# Patient Record
Sex: FEMALE | Race: WHITE | URBAN - METROPOLITAN AREA
[De-identification: names, ages, dates, MRNs, and addresses within clinical notes are randomized per-mention and may not be internally consistent; named-entity substitution may affect disease eponyms.]

---

## 2017-01-17 ENCOUNTER — DOCTOR'S OFFICE (OUTPATIENT)
Dept: URBAN - METROPOLITAN AREA CLINIC 137 | Facility: CLINIC | Age: 82
Setting detail: OPHTHALMOLOGY
End: 2017-01-17
Payer: COMMERCIAL

## 2017-01-17 DIAGNOSIS — H43.811: ICD-10-CM

## 2017-01-17 DIAGNOSIS — Z96.1: ICD-10-CM

## 2017-01-17 DIAGNOSIS — H53.122: ICD-10-CM

## 2017-01-17 DIAGNOSIS — H43.812: ICD-10-CM

## 2017-01-17 DIAGNOSIS — H59.032: ICD-10-CM

## 2017-01-17 PROCEDURE — 92014 COMPRE OPH EXAM EST PT 1/>: CPT | Performed by: OPHTHALMOLOGY

## 2017-01-17 PROCEDURE — 92235 FLUORESCEIN ANGRPH MLTIFRAME: CPT | Performed by: OPHTHALMOLOGY

## 2017-01-17 PROCEDURE — 92134 CPTRZ OPH DX IMG PST SGM RTA: CPT | Performed by: OPHTHALMOLOGY

## 2017-01-17 PROCEDURE — 92226 OPHTHALMOSCOPY EXT SUBSEQUENT: CPT | Performed by: OPHTHALMOLOGY

## 2017-01-17 PROCEDURE — 92250 FUNDUS PHOTOGRAPHY W/I&R: CPT | Performed by: OPHTHALMOLOGY

## 2017-01-17 ASSESSMENT — REFRACTION_CURRENTRX
OS_VPRISM_DIRECTION: PROGS
OD_OVR_VA: 20/
OD_ADD: +2.50
OD_OVR_VA: 20/
OS_OVR_VA: 20/
OD_SPHERE: +3.25
OS_SPHERE: +5.25
OD_OVR_VA: 20/
OS_ADD: +2.50
OD_VPRISM_DIRECTION: PROGS
OD_CYLINDER: +0.75
OS_OVR_VA: 20/
OS_CYLINDER: +0.50
OD_AXIS: 153
OS_OVR_VA: 20/
OS_AXIS: 125

## 2017-01-17 ASSESSMENT — REFRACTION_AUTOREFRACTION
OD_AXIS: 168
OD_SPHERE: -0.25
OS_AXIS: 167
OS_CYLINDER: +2.50
OD_CYLINDER: +1.00
OS_SPHERE: +0.50

## 2017-01-17 ASSESSMENT — REFRACTION_OUTSIDERX
OD_VA1: 20/25-1
OS_VA3: 20/
OD_CYLINDER: +0.75
OD_VA2: 20/J-1
OS_VA1: 20/60
OS_ADD: +2.50
OS_AXIS: 010
OU_VA: 20/
OS_CYLINDER: SPH
OD_VA3: 20/
OS_SPHERE: +5.25
OU_VA: 20/
OD_VA1: 20/25
OD_SPHERE: -0.50
OD_ADD: +2.50
OD_AXIS: 165
OD_VA3: 20/
OD_CYLINDER: +0.75
OD_ADD: +2.50
OS_SPHERE: PLANO
OS_VA2: 20/
OS_CYLINDER: +0.75
OS_VA3: 20/
OD_SPHERE: +3.25
OD_VA2: 20/
OS_ADD: +2.50
OS_VA1: 20/40-
OD_AXIS: 180

## 2017-01-17 ASSESSMENT — LID EXAM ASSESSMENTS
OD_BLEPHARITIS: T
OS_BLEPHARITIS: T

## 2017-01-17 ASSESSMENT — REFRACTION_MANIFEST
OD_VA1: 20/
OU_VA: 20/
OS_VA2: 20/
OS_VA3: 20/
OD_VA2: 20/
OD_VA3: 20/
OS_VA1: 20/

## 2017-01-17 ASSESSMENT — CONFRONTATIONAL VISUAL FIELD TEST (CVF)
OS_FINDINGS: FULL
OD_FINDINGS: FULL

## 2017-01-17 ASSESSMENT — VISUAL ACUITY
OS_BCVA: 20/25
OD_BCVA: 20/60-1

## 2017-01-17 ASSESSMENT — SPHEQUIV_DERIVED
OS_SPHEQUIV: 1.75
OD_SPHEQUIV: 0.25

## 2017-02-21 ENCOUNTER — DOCTOR'S OFFICE (OUTPATIENT)
Dept: URBAN - METROPOLITAN AREA CLINIC 137 | Facility: CLINIC | Age: 82
Setting detail: OPHTHALMOLOGY
End: 2017-02-21
Payer: COMMERCIAL

## 2017-02-21 DIAGNOSIS — H59.032: ICD-10-CM

## 2017-02-21 DIAGNOSIS — H02.015: ICD-10-CM

## 2017-02-21 DIAGNOSIS — H02.014: ICD-10-CM

## 2017-02-21 DIAGNOSIS — H43.811: ICD-10-CM

## 2017-02-21 DIAGNOSIS — H53.122: ICD-10-CM

## 2017-02-21 DIAGNOSIS — H43.812: ICD-10-CM

## 2017-02-21 PROCEDURE — 92134 CPTRZ OPH DX IMG PST SGM RTA: CPT | Performed by: OPHTHALMOLOGY

## 2017-02-21 PROCEDURE — 67820 REVISE EYELASHES: CPT | Performed by: OPHTHALMOLOGY

## 2017-02-21 PROCEDURE — 92226 OPHTHALMOSCOPY EXT SUBSEQUENT: CPT | Performed by: OPHTHALMOLOGY

## 2017-02-21 PROCEDURE — 92014 COMPRE OPH EXAM EST PT 1/>: CPT | Performed by: OPHTHALMOLOGY

## 2017-02-21 ASSESSMENT — REFRACTION_OUTSIDERX
OD_VA1: 20/25
OD_ADD: +2.50
OS_ADD: +2.50
OD_ADD: +2.50
OS_VA3: 20/
OS_AXIS: 010
OD_CYLINDER: +0.75
OU_VA: 20/
OD_VA2: 20/J-1
OS_SPHERE: +5.25
OS_SPHERE: PLANO
OS_VA3: 20/
OS_VA1: 20/40-
OD_CYLINDER: +0.75
OD_AXIS: 180
OD_VA3: 20/
OU_VA: 20/
OS_CYLINDER: +0.75
OS_ADD: +2.50
OD_VA3: 20/
OD_SPHERE: -0.50
OD_SPHERE: +3.25
OD_AXIS: 165
OS_VA2: 20/
OD_VA2: 20/
OS_CYLINDER: SPH
OS_VA1: 20/60
OD_VA1: 20/25-1

## 2017-02-21 ASSESSMENT — REFRACTION_CURRENTRX
OS_ADD: +2.50
OS_OVR_VA: 20/
OD_SPHERE: +3.25
OD_VPRISM_DIRECTION: PROGS
OS_SPHERE: +5.25
OD_OVR_VA: 20/
OS_VPRISM_DIRECTION: PROGS
OS_AXIS: 125
OS_CYLINDER: +0.50
OD_AXIS: 153
OD_OVR_VA: 20/
OD_CYLINDER: +0.75
OS_OVR_VA: 20/
OD_ADD: +2.50
OS_OVR_VA: 20/
OD_OVR_VA: 20/

## 2017-02-21 ASSESSMENT — SPHEQUIV_DERIVED
OS_SPHEQUIV: 1.75
OD_SPHEQUIV: 0.25

## 2017-02-21 ASSESSMENT — LID EXAM ASSESSMENTS
OD_BLEPHARITIS: 2+
OS_MEIBOMITIS: 2+
OS_TRICHIASIS: LLL LUL 1+ 2+
OS_BLEPHARITIS: 2+
OD_MEIBOMITIS: 2+

## 2017-02-21 ASSESSMENT — VISUAL ACUITY
OS_BCVA: 20/25-2
OD_BCVA: 20/50

## 2017-02-21 ASSESSMENT — REFRACTION_MANIFEST
OD_VA3: 20/
OS_VA3: 20/
OU_VA: 20/
OS_VA1: 20/
OD_VA1: 20/
OS_VA2: 20/
OD_VA2: 20/

## 2017-02-21 ASSESSMENT — REFRACTION_AUTOREFRACTION
OS_SPHERE: +0.50
OD_AXIS: 168
OD_CYLINDER: +1.00
OS_CYLINDER: +2.50
OD_SPHERE: -0.25
OS_AXIS: 167

## 2017-02-21 ASSESSMENT — CONFRONTATIONAL VISUAL FIELD TEST (CVF)
OS_FINDINGS: FULL
OD_FINDINGS: FULL

## 2017-03-10 ENCOUNTER — DOCTOR'S OFFICE (OUTPATIENT)
Dept: URBAN - METROPOLITAN AREA CLINIC 137 | Facility: CLINIC | Age: 82
Setting detail: OPHTHALMOLOGY
End: 2017-03-10

## 2017-03-10 DIAGNOSIS — H05.013: ICD-10-CM

## 2017-03-10 PROCEDURE — 67820 REVISE EYELASHES: CPT | Performed by: OPHTHALMOLOGY

## 2017-03-21 ENCOUNTER — RX ONLY (RX ONLY)
Age: 82
End: 2017-03-21

## 2017-03-21 ENCOUNTER — DOCTOR'S OFFICE (OUTPATIENT)
Dept: URBAN - METROPOLITAN AREA CLINIC 137 | Facility: CLINIC | Age: 82
Setting detail: OPHTHALMOLOGY
End: 2017-03-21
Payer: COMMERCIAL

## 2017-03-21 DIAGNOSIS — H01.005: ICD-10-CM

## 2017-03-21 DIAGNOSIS — H01.004: ICD-10-CM

## 2017-03-21 DIAGNOSIS — H02.012: ICD-10-CM

## 2017-03-21 DIAGNOSIS — H01.001: ICD-10-CM

## 2017-03-21 DIAGNOSIS — H01.002: ICD-10-CM

## 2017-03-21 DIAGNOSIS — H43.812: ICD-10-CM

## 2017-03-21 DIAGNOSIS — H02.015: ICD-10-CM

## 2017-03-21 DIAGNOSIS — H53.122: ICD-10-CM

## 2017-03-21 DIAGNOSIS — H43.811: ICD-10-CM

## 2017-03-21 DIAGNOSIS — H59.032: ICD-10-CM

## 2017-03-21 DIAGNOSIS — H40.013: ICD-10-CM

## 2017-03-21 PROCEDURE — 92134 CPTRZ OPH DX IMG PST SGM RTA: CPT | Performed by: OPHTHALMOLOGY

## 2017-03-21 PROCEDURE — 92014 COMPRE OPH EXAM EST PT 1/>: CPT | Performed by: OPHTHALMOLOGY

## 2017-03-21 PROCEDURE — 92226 OPHTHALMOSCOPY EXT SUBSEQUENT: CPT | Performed by: OPHTHALMOLOGY

## 2017-03-21 ASSESSMENT — REFRACTION_OUTSIDERX
OD_ADD: +2.50
OD_VA3: 20/
OD_AXIS: 165
OS_VA2: 20/
OD_SPHERE: -0.50
OD_VA1: 20/25-1
OD_VA1: 20/25
OS_VA3: 20/
OS_ADD: +2.50
OS_SPHERE: PLANO
OU_VA: 20/
OD_CYLINDER: +0.75
OS_VA1: 20/60
OS_AXIS: 010
OD_CYLINDER: +0.75
OS_VA1: 20/40-
OS_SPHERE: +5.25
OD_ADD: +2.50
OS_CYLINDER: +0.75
OU_VA: 20/
OD_SPHERE: +3.25
OD_VA2: 20/
OD_VA3: 20/
OS_VA3: 20/
OD_VA2: 20/J-1
OD_AXIS: 180
OS_CYLINDER: SPH
OS_ADD: +2.50

## 2017-03-21 ASSESSMENT — REFRACTION_AUTOREFRACTION
OD_CYLINDER: +1.00
OD_AXIS: 168
OS_AXIS: 167
OS_CYLINDER: +2.50
OS_SPHERE: +0.50
OD_SPHERE: -0.25

## 2017-03-21 ASSESSMENT — REFRACTION_CURRENTRX
OD_OVR_VA: 20/
OS_SPHERE: +5.25
OS_OVR_VA: 20/
OD_OVR_VA: 20/
OD_VPRISM_DIRECTION: PROGS
OD_SPHERE: +3.25
OS_OVR_VA: 20/
OD_ADD: +2.50
OS_VPRISM_DIRECTION: PROGS
OD_OVR_VA: 20/
OS_OVR_VA: 20/
OD_CYLINDER: +0.75
OS_CYLINDER: +0.50
OS_ADD: +2.50
OD_AXIS: 153
OS_AXIS: 125

## 2017-03-21 ASSESSMENT — REFRACTION_MANIFEST
OS_VA2: 20/
OD_VA2: 20/
OD_VA1: 20/
OU_VA: 20/
OD_VA3: 20/
OS_VA3: 20/
OS_VA1: 20/

## 2017-03-21 ASSESSMENT — LID EXAM ASSESSMENTS
OS_TRICHIASIS: LLL 1+
OS_MEIBOMITIS: 2+
OD_MEIBOMITIS: 2+
OS_BLEPHARITIS: 2+
OD_BLEPHARITIS: 2+
OD_TRICHIASIS: RLL 1+

## 2017-03-21 ASSESSMENT — VISUAL ACUITY
OS_BCVA: 20/20-1
OD_BCVA: 20/40+1

## 2017-03-21 ASSESSMENT — SPHEQUIV_DERIVED
OS_SPHEQUIV: 1.75
OD_SPHEQUIV: 0.25

## 2017-03-21 ASSESSMENT — CONFRONTATIONAL VISUAL FIELD TEST (CVF)
OS_FINDINGS: FULL
OD_FINDINGS: FULL

## 2017-04-18 ENCOUNTER — DOCTOR'S OFFICE (OUTPATIENT)
Dept: URBAN - METROPOLITAN AREA CLINIC 137 | Facility: CLINIC | Age: 82
Setting detail: OPHTHALMOLOGY
End: 2017-04-18
Payer: COMMERCIAL

## 2017-04-18 DIAGNOSIS — H01.004: ICD-10-CM

## 2017-04-18 DIAGNOSIS — H01.005: ICD-10-CM

## 2017-04-18 DIAGNOSIS — H43.812: ICD-10-CM

## 2017-04-18 DIAGNOSIS — H40.013: ICD-10-CM

## 2017-04-18 DIAGNOSIS — H04.121: ICD-10-CM

## 2017-04-18 DIAGNOSIS — H53.122: ICD-10-CM

## 2017-04-18 DIAGNOSIS — H01.002: ICD-10-CM

## 2017-04-18 DIAGNOSIS — H59.032: ICD-10-CM

## 2017-04-18 DIAGNOSIS — H43.811: ICD-10-CM

## 2017-04-18 DIAGNOSIS — H02.012: ICD-10-CM

## 2017-04-18 DIAGNOSIS — H02.015: ICD-10-CM

## 2017-04-18 DIAGNOSIS — H01.001: ICD-10-CM

## 2017-04-18 PROCEDURE — 92134 CPTRZ OPH DX IMG PST SGM RTA: CPT | Performed by: OPHTHALMOLOGY

## 2017-04-18 PROCEDURE — 92014 COMPRE OPH EXAM EST PT 1/>: CPT | Performed by: OPHTHALMOLOGY

## 2017-04-18 ASSESSMENT — REFRACTION_AUTOREFRACTION
OS_SPHERE: +0.50
OS_AXIS: 167
OD_SPHERE: -0.25
OD_CYLINDER: +1.00
OD_AXIS: 168
OS_CYLINDER: +2.50

## 2017-04-18 ASSESSMENT — REFRACTION_OUTSIDERX
OS_ADD: +2.50
OD_VA1: 20/25
OD_CYLINDER: +0.75
OD_VA2: 20/
OD_SPHERE: -0.50
OU_VA: 20/
OD_ADD: +2.50
OD_AXIS: 165
OS_AXIS: 010
OS_VA2: 20/
OD_VA1: 20/25-1
OD_VA2: 20/J-1
OS_SPHERE: PLANO
OS_VA1: 20/40-
OS_SPHERE: +5.25
OS_VA1: 20/60
OS_VA3: 20/
OU_VA: 20/
OS_CYLINDER: SPH
OD_VA3: 20/
OD_SPHERE: +3.25
OS_CYLINDER: +0.75
OD_VA3: 20/
OD_AXIS: 180
OS_ADD: +2.50
OD_CYLINDER: +0.75
OD_ADD: +2.50
OS_VA3: 20/

## 2017-04-18 ASSESSMENT — REFRACTION_CURRENTRX
OD_OVR_VA: 20/
OD_AXIS: 153
OS_AXIS: 125
OS_OVR_VA: 20/
OS_VPRISM_DIRECTION: PROGS
OS_ADD: +2.50
OD_OVR_VA: 20/
OS_CYLINDER: +0.50
OD_CYLINDER: +0.75
OD_ADD: +2.50
OD_OVR_VA: 20/
OS_OVR_VA: 20/
OD_VPRISM_DIRECTION: PROGS
OD_SPHERE: +3.25
OS_SPHERE: +5.25
OS_OVR_VA: 20/

## 2017-04-18 ASSESSMENT — LID EXAM ASSESSMENTS
OS_BLEPHARITIS: 2+
OD_MEIBOMITIS: 2+
OD_TRICHIASIS: RLL 1+
OS_TRICHIASIS: LLL 1+
OS_MEIBOMITIS: 2+
OD_BLEPHARITIS: 2+

## 2017-04-18 ASSESSMENT — CONFRONTATIONAL VISUAL FIELD TEST (CVF)
OS_FINDINGS: FULL
OD_FINDINGS: FULL

## 2017-04-18 ASSESSMENT — REFRACTION_MANIFEST
OS_VA2: 20/
OU_VA: 20/
OS_VA1: 20/
OS_VA3: 20/
OD_VA1: 20/
OD_VA2: 20/
OD_VA3: 20/

## 2017-04-18 ASSESSMENT — SPHEQUIV_DERIVED
OS_SPHEQUIV: 1.75
OD_SPHEQUIV: 0.25

## 2017-04-18 ASSESSMENT — VISUAL ACUITY
OD_BCVA: 20/50-2
OS_BCVA: 20/25-1

## 2017-05-23 ENCOUNTER — DOCTOR'S OFFICE (OUTPATIENT)
Dept: URBAN - METROPOLITAN AREA CLINIC 136 | Facility: CLINIC | Age: 82
Setting detail: OPHTHALMOLOGY
End: 2017-05-23
Payer: COMMERCIAL

## 2017-05-23 ENCOUNTER — RX ONLY (RX ONLY)
Age: 82
End: 2017-05-23

## 2017-05-23 DIAGNOSIS — H01.002: ICD-10-CM

## 2017-05-23 DIAGNOSIS — H43.813: ICD-10-CM

## 2017-05-23 DIAGNOSIS — H04.122: ICD-10-CM

## 2017-05-23 DIAGNOSIS — H40.013: ICD-10-CM

## 2017-05-23 DIAGNOSIS — H53.022: ICD-10-CM

## 2017-05-23 DIAGNOSIS — H01.001: ICD-10-CM

## 2017-05-23 DIAGNOSIS — Z96.1: ICD-10-CM

## 2017-05-23 DIAGNOSIS — H04.121: ICD-10-CM

## 2017-05-23 DIAGNOSIS — H02.012: ICD-10-CM

## 2017-05-23 DIAGNOSIS — H01.004: ICD-10-CM

## 2017-05-23 DIAGNOSIS — H40.033: ICD-10-CM

## 2017-05-23 DIAGNOSIS — H02.015: ICD-10-CM

## 2017-05-23 PROBLEM — H59.032 CYSTOID MACULAR EDEMA FOLLOWING CATARACT SURGERY; LEFT EYE: Status: RESOLVED | Noted: 2017-03-21 | Resolved: 2017-05-23

## 2017-05-23 PROCEDURE — 92014 COMPRE OPH EXAM EST PT 1/>: CPT | Performed by: OPHTHALMOLOGY

## 2017-05-23 ASSESSMENT — REFRACTION_CURRENTRX
OS_OVR_VA: 20/
OS_SPHERE: +5.25
OD_CYLINDER: +0.75
OD_SPHERE: +3.25
OS_OVR_VA: 20/
OS_OVR_VA: 20/
OS_AXIS: 125
OS_CYLINDER: +0.50
OD_OVR_VA: 20/
OD_ADD: +2.50
OD_AXIS: 153
OD_OVR_VA: 20/
OD_VPRISM_DIRECTION: PROGS
OS_VPRISM_DIRECTION: PROGS
OS_ADD: +2.50
OD_OVR_VA: 20/

## 2017-05-23 ASSESSMENT — REFRACTION_AUTOREFRACTION
OS_SPHERE: +0.50
OD_AXIS: 085
OS_AXIS: 113
OD_CYLINDER: -1.00
OD_SPHERE: +0.25
OS_CYLINDER: -1.50

## 2017-05-23 ASSESSMENT — SUPERFICIAL PUNCTATE KERATITIS (SPK): OS_SPK: 2+ 3+

## 2017-05-23 ASSESSMENT — SPHEQUIV_DERIVED
OD_SPHEQUIV: -0.25
OS_SPHEQUIV: -0.25

## 2017-05-23 ASSESSMENT — REFRACTION_OUTSIDERX
OD_VA2: 20/J-1
OS_ADD: +2.50
OS_AXIS: 010
OD_CYLINDER: +0.75
OS_SPHERE: +5.25
OD_SPHERE: -0.50
OD_VA2: 20/
OS_CYLINDER: +0.75
OS_VA1: 20/60
OD_VA1: 20/25
OS_VA1: 20/40-
OD_CYLINDER: +0.75
OD_ADD: +2.50
OD_VA3: 20/
OD_VA1: 20/25-1
OU_VA: 20/
OS_SPHERE: PLANO
OD_VA3: 20/
OD_SPHERE: +3.25
OU_VA: 20/
OS_CYLINDER: SPH
OD_AXIS: 165
OS_ADD: +2.50
OS_VA2: 20/
OD_AXIS: 180
OD_ADD: +2.50
OS_VA3: 20/
OS_VA3: 20/

## 2017-05-23 ASSESSMENT — REFRACTION_MANIFEST
OD_VA1: 20/
OD_VA3: 20/
OD_VA2: 20/
OS_VA2: 20/
OU_VA: 20/
OS_VA1: 20/
OS_VA3: 20/

## 2017-05-23 ASSESSMENT — LID EXAM ASSESSMENTS
OS_MEIBOMITIS: 2+
OD_BLEPHARITIS: 2+
OD_MEIBOMITIS: 2+
OS_BLEPHARITIS: 2+

## 2017-05-23 ASSESSMENT — KERATOMETRY
OS_K2POWER_DIOPTERS: 42.75
OD_K2POWER_DIOPTERS: 42.75
METHOD_AUTO_MANUAL: AUTO
OD_K1POWER_DIOPTERS: 42.50
OS_K1POWER_DIOPTERS: 42.75

## 2017-05-23 ASSESSMENT — AXIALLENGTH_DERIVED
OS_AL: 23.9708
OD_AL: 24.0183

## 2017-05-23 ASSESSMENT — CONFRONTATIONAL VISUAL FIELD TEST (CVF)
OD_FINDINGS: FULL
OS_FINDINGS: FULL

## 2017-05-23 ASSESSMENT — VISUAL ACUITY
OD_BCVA: 20/50-1
OS_BCVA: 20/30+2

## 2017-07-07 ENCOUNTER — DOCTOR'S OFFICE (OUTPATIENT)
Dept: URBAN - METROPOLITAN AREA CLINIC 137 | Facility: CLINIC | Age: 82
Setting detail: OPHTHALMOLOGY
End: 2017-07-07

## 2017-07-07 DIAGNOSIS — H04.122: ICD-10-CM

## 2017-07-07 DIAGNOSIS — H40.033: ICD-10-CM

## 2017-07-07 DIAGNOSIS — H01.002: ICD-10-CM

## 2017-07-07 DIAGNOSIS — H40.013: ICD-10-CM

## 2017-07-07 DIAGNOSIS — H01.004: ICD-10-CM

## 2017-07-07 DIAGNOSIS — H02.015: ICD-10-CM

## 2017-07-07 DIAGNOSIS — H04.121: ICD-10-CM

## 2017-07-07 DIAGNOSIS — H43.813: ICD-10-CM

## 2017-07-07 DIAGNOSIS — H53.022: ICD-10-CM

## 2017-07-07 DIAGNOSIS — Z96.1: ICD-10-CM

## 2017-07-07 DIAGNOSIS — H02.012: ICD-10-CM

## 2017-07-07 DIAGNOSIS — H01.001: ICD-10-CM

## 2017-07-07 PROCEDURE — 92012 INTRM OPH EXAM EST PATIENT: CPT | Performed by: OPHTHALMOLOGY

## 2017-07-07 ASSESSMENT — REFRACTION_OUTSIDERX
OD_SPHERE: -0.50
OS_ADD: +2.50
OS_SPHERE: PLANO
OS_SPHERE: +5.25
OD_SPHERE: +3.25
OD_VA2: 20/
OD_CYLINDER: +0.75
OD_VA1: 20/25
OD_VA2: 20/J-1
OS_ADD: +2.50
OU_VA: 20/
OU_VA: 20/
OS_VA2: 20/
OD_AXIS: 180
OD_VA1: 20/25-1
OD_AXIS: 165
OS_AXIS: 010
OS_CYLINDER: SPH
OS_VA3: 20/
OD_CYLINDER: +0.75
OD_VA3: 20/
OS_VA3: 20/
OS_VA1: 20/60
OS_VA1: 20/40-
OD_VA3: 20/
OS_CYLINDER: +0.75
OD_ADD: +2.50
OD_ADD: +2.50

## 2017-07-07 ASSESSMENT — VISUAL ACUITY
OD_BCVA: 20/50-1
OS_BCVA: 20/25

## 2017-07-07 ASSESSMENT — REFRACTION_AUTOREFRACTION
OS_SPHERE: +0.50
OS_AXIS: 113
OD_SPHERE: +0.25
OD_AXIS: 085
OD_CYLINDER: -1.00
OS_CYLINDER: -1.50

## 2017-07-07 ASSESSMENT — REFRACTION_CURRENTRX
OS_OVR_VA: 20/
OD_ADD: +2.50
OS_AXIS: 125
OD_OVR_VA: 20/
OD_OVR_VA: 20/
OS_OVR_VA: 20/
OD_CYLINDER: +0.75
OS_SPHERE: +5.25
OS_VPRISM_DIRECTION: PROGS
OD_SPHERE: +3.25
OS_CYLINDER: +0.50
OD_VPRISM_DIRECTION: PROGS
OS_ADD: +2.50
OD_OVR_VA: 20/
OS_OVR_VA: 20/
OD_AXIS: 153

## 2017-07-07 ASSESSMENT — SPHEQUIV_DERIVED
OD_SPHEQUIV: -0.25
OS_SPHEQUIV: -0.25

## 2017-07-07 ASSESSMENT — SUPERFICIAL PUNCTATE KERATITIS (SPK): OS_SPK: 1+

## 2017-07-07 ASSESSMENT — REFRACTION_MANIFEST
OD_VA3: 20/
OS_VA2: 20/
OU_VA: 20/
OD_VA1: 20/
OD_VA2: 20/
OS_VA1: 20/
OS_VA3: 20/

## 2017-07-07 ASSESSMENT — CONFRONTATIONAL VISUAL FIELD TEST (CVF)
OD_FINDINGS: FULL
OS_FINDINGS: FULL

## 2017-07-07 ASSESSMENT — LID EXAM ASSESSMENTS
OS_MEIBOMITIS: 2+
OD_MEIBOMITIS: 2+
OS_BLEPHARITIS: 2+
OD_BLEPHARITIS: 2+

## 2017-09-22 ENCOUNTER — GENERIC CONVERSION - ENCOUNTER (OUTPATIENT)
Dept: OTHER | Facility: OTHER | Age: 82
End: 2017-09-22

## 2017-10-06 ENCOUNTER — DOCTOR'S OFFICE (OUTPATIENT)
Dept: URBAN - METROPOLITAN AREA CLINIC 137 | Facility: CLINIC | Age: 82
Setting detail: OPHTHALMOLOGY
End: 2017-10-06

## 2017-10-06 DIAGNOSIS — H02.012: ICD-10-CM

## 2017-10-06 DIAGNOSIS — H04.122: ICD-10-CM

## 2017-10-06 DIAGNOSIS — H02.015: ICD-10-CM

## 2017-10-06 DIAGNOSIS — Z96.1: ICD-10-CM

## 2017-10-06 DIAGNOSIS — H04.121: ICD-10-CM

## 2017-10-06 DIAGNOSIS — H01.002: ICD-10-CM

## 2017-10-06 DIAGNOSIS — H01.001: ICD-10-CM

## 2017-10-06 DIAGNOSIS — H01.004: ICD-10-CM

## 2017-10-06 DIAGNOSIS — H40.013: ICD-10-CM

## 2017-10-06 DIAGNOSIS — H53.022: ICD-10-CM

## 2017-10-06 DIAGNOSIS — H40.033: ICD-10-CM

## 2017-10-06 DIAGNOSIS — H01.005: ICD-10-CM

## 2017-10-06 PROCEDURE — 99212 OFFICE O/P EST SF 10 MIN: CPT | Performed by: OPHTHALMOLOGY

## 2017-10-06 ASSESSMENT — CONFRONTATIONAL VISUAL FIELD TEST (CVF)
OS_FINDINGS: FULL
OD_FINDINGS: FULL

## 2017-10-06 ASSESSMENT — LID EXAM ASSESSMENTS
OD_MEIBOMITIS: 2+
OD_BLEPHARITIS: 2+
OS_MEIBOMITIS: 2+
OS_BLEPHARITIS: 2+

## 2017-10-10 ASSESSMENT — REFRACTION_OUTSIDERX
OU_VA: 20/
OS_CYLINDER: +0.75
OD_CYLINDER: +0.75
OU_VA: 20/
OS_ADD: +2.50
OS_VA3: 20/
OD_AXIS: 180
OS_CYLINDER: SPH
OS_VA1: 20/40-
OD_AXIS: 165
OS_VA1: 20/60
OD_SPHERE: +3.25
OD_VA3: 20/
OS_SPHERE: PLANO
OD_ADD: +2.50
OS_VA3: 20/
OS_ADD: +2.50
OS_AXIS: 010
OD_ADD: +2.50
OD_VA1: 20/25-1
OS_SPHERE: +5.25
OD_VA3: 20/
OD_VA1: 20/25
OS_VA2: 20/
OD_SPHERE: -0.50
OD_CYLINDER: +0.75
OD_VA2: 20/
OD_VA2: 20/J-1

## 2017-10-10 ASSESSMENT — REFRACTION_CURRENTRX
OD_OVR_VA: 20/
OD_SPHERE: +3.25
OS_ADD: +2.50
OD_AXIS: 153
OS_AXIS: 125
OS_OVR_VA: 20/
OS_CYLINDER: +0.50
OD_OVR_VA: 20/
OS_OVR_VA: 20/
OS_SPHERE: +5.25
OS_VPRISM_DIRECTION: PROGS
OD_VPRISM_DIRECTION: PROGS
OD_CYLINDER: +0.75
OD_ADD: +2.50
OD_OVR_VA: 20/
OS_OVR_VA: 20/

## 2017-10-10 ASSESSMENT — VISUAL ACUITY
OS_BCVA: 20/40
OD_BCVA: 20/50-1

## 2017-10-10 ASSESSMENT — REFRACTION_MANIFEST
OD_VA3: 20/
OS_VA1: 20/
OD_VA2: 20/
OS_VA2: 20/
OD_VA1: 20/
OU_VA: 20/
OS_VA3: 20/

## 2017-10-10 ASSESSMENT — REFRACTION_AUTOREFRACTION
OD_SPHERE: +0.25
OD_CYLINDER: -1.00
OS_SPHERE: +0.50
OS_CYLINDER: -1.50
OS_AXIS: 113
OD_AXIS: 085

## 2017-10-10 ASSESSMENT — SPHEQUIV_DERIVED
OD_SPHEQUIV: -0.25
OS_SPHEQUIV: -0.25

## 2018-08-17 ENCOUNTER — DOCTOR'S OFFICE (OUTPATIENT)
Dept: URBAN - METROPOLITAN AREA CLINIC 137 | Facility: CLINIC | Age: 83
Setting detail: OPHTHALMOLOGY
End: 2018-08-17
Payer: COMMERCIAL

## 2018-08-17 DIAGNOSIS — H52.4: ICD-10-CM

## 2018-08-17 PROBLEM — H40.033 NARROW ANGLE; BOTH EYES ;
S/P LPI OU: Status: ACTIVE | Noted: 2017-03-21

## 2018-08-17 PROBLEM — H43.21 ASTEROID HYALOSIS: Status: ACTIVE | Noted: 2017-03-21

## 2018-08-17 PROBLEM — H01.002 BLEPHARITIS; RIGHT UPPER LID, RIGHT LOWER LID, LEFT UPPER LID, LEFT LOWER LID: Status: ACTIVE | Noted: 2017-07-07

## 2018-08-17 PROBLEM — H53.022: Status: ACTIVE | Noted: 2017-03-21

## 2018-08-17 PROBLEM — H43.813 POST VITREOUS DETACHMENT; RIGHT EYE, LEFT EYE: Status: ACTIVE | Noted: 2017-05-23

## 2018-08-17 PROBLEM — H01.004 BLEPHARITIS; RIGHT UPPER LID, RIGHT LOWER LID, LEFT UPPER LID, LEFT LOWER LID: Status: ACTIVE | Noted: 2017-07-07

## 2018-08-17 PROBLEM — H40.013 GLAUCOMA SUSPECT OPEN ANGLE; BOTH EYES: Status: ACTIVE | Noted: 2017-03-21

## 2018-08-17 PROBLEM — H04.123 DRY EYE; BOTH EYES: Status: ACTIVE | Noted: 2018-08-17

## 2018-08-17 PROBLEM — H01.005 BLEPHARITIS; RIGHT UPPER LID, RIGHT LOWER LID, LEFT UPPER LID, LEFT LOWER LID: Status: ACTIVE | Noted: 2017-07-07

## 2018-08-17 PROBLEM — H01.001 BLEPHARITIS; RIGHT UPPER LID, RIGHT LOWER LID, LEFT UPPER LID, LEFT LOWER LID: Status: ACTIVE | Noted: 2017-07-07

## 2018-08-17 PROBLEM — Z96.1 PSEUDOPHAKIA: Status: ACTIVE | Noted: 2017-03-21

## 2018-08-17 PROCEDURE — 92012 INTRM OPH EXAM EST PATIENT: CPT | Performed by: OPHTHALMOLOGY

## 2018-08-17 ASSESSMENT — REFRACTION_CURRENTRX
OS_CYLINDER: +0.50
OD_CYLINDER: +0.75
OD_SPHERE: +3.25
OS_ADD: +2.50
OS_OVR_VA: 20/
OD_VPRISM_DIRECTION: PROGS
OD_AXIS: 153
OS_OVR_VA: 20/
OD_OVR_VA: 20/
OD_OVR_VA: 20/
OS_OVR_VA: 20/
OS_SPHERE: +5.25
OS_VPRISM_DIRECTION: PROGS
OD_ADD: +2.50
OD_OVR_VA: 20/
OS_AXIS: 125

## 2018-08-17 ASSESSMENT — REFRACTION_OUTSIDERX
OD_VA3: 20/
OS_ADD: +2.50
OD_CYLINDER: +0.75
OS_SPHERE: PLANO
OD_AXIS: 180
OS_VA3: 20/
OS_VA2: 20/20
OS_ADD: +2.50
OD_ADD: +2.50
OD_VA2: 20/20
OD_SPHERE: -0.50
OD_SPHERE: +0.25
OD_AXIS: 180
OU_VA: 20/30+3
OD_ADD: +2.50
OD_VA1: 20/25-1
OS_SPHERE: PLANO
OD_VA2: 20/J-1
OS_CYLINDER: SPH
OD_VA3: 20/
OS_VA1: 20/60
OU_VA: 20/
OS_VA3: 20/
OD_VA1: 20/30+2
OS_VA1: 20/50-1
OD_CYLINDER: +0.50

## 2018-08-17 ASSESSMENT — LID EXAM ASSESSMENTS
OD_MEIBOMITIS: 2+
OD_BLEPHARITIS: 2+
OS_MEIBOMITIS: 2+
OS_BLEPHARITIS: 2+

## 2018-08-17 ASSESSMENT — REFRACTION_MANIFEST
OS_VA2: 20/
OD_VA2: 20/
OS_VA3: 20/
OD_VA1: 20/
OU_VA: 20/
OS_VA1: 20/
OD_VA3: 20/

## 2018-08-17 ASSESSMENT — REFRACTION_AUTOREFRACTION
OS_CYLINDER: +1.00
OS_AXIS: 001
OS_SPHERE: +0.75

## 2018-08-17 ASSESSMENT — KERATOMETRY
OS_K1POWER_DIOPTERS: 42.75
OD_K2POWER_DIOPTERS: 42.75
METHOD_AUTO_MANUAL: AUTO
OS_K2POWER_DIOPTERS: 42.75
OD_K1POWER_DIOPTERS: 42.50

## 2018-08-17 ASSESSMENT — CONFRONTATIONAL VISUAL FIELD TEST (CVF)
OD_FINDINGS: FULL
OS_FINDINGS: FULL

## 2018-08-17 ASSESSMENT — VISUAL ACUITY
OS_BCVA: 20/30-2
OD_BCVA: 20/60-1

## 2018-08-17 ASSESSMENT — SPHEQUIV_DERIVED: OS_SPHEQUIV: 1.25

## 2018-08-17 ASSESSMENT — AXIALLENGTH_DERIVED: OS_AL: 23.3823

## 2019-02-13 ENCOUNTER — OFFICE VISIT (OUTPATIENT)
Dept: URGENT CARE | Facility: CLINIC | Age: 84
End: 2019-02-13
Payer: COMMERCIAL

## 2019-02-13 VITALS
SYSTOLIC BLOOD PRESSURE: 150 MMHG | OXYGEN SATURATION: 96 % | HEART RATE: 100 BPM | HEIGHT: 62 IN | DIASTOLIC BLOOD PRESSURE: 68 MMHG | BODY MASS INDEX: 30.73 KG/M2 | RESPIRATION RATE: 20 BRPM | TEMPERATURE: 97.7 F | WEIGHT: 167 LBS

## 2019-02-13 DIAGNOSIS — M19.012 OSTEOARTHRITIS OF BOTH SHOULDERS, UNSPECIFIED OSTEOARTHRITIS TYPE: Primary | ICD-10-CM

## 2019-02-13 DIAGNOSIS — M19.011 OSTEOARTHRITIS OF BOTH SHOULDERS, UNSPECIFIED OSTEOARTHRITIS TYPE: Primary | ICD-10-CM

## 2019-02-13 PROCEDURE — G0382 LEV 3 HOSP TYPE B ED VISIT: HCPCS | Performed by: PHYSICIAN ASSISTANT

## 2019-02-13 RX ORDER — ATORVASTATIN CALCIUM 20 MG/1
20 TABLET, FILM COATED ORAL DAILY
Refills: 1 | COMMUNITY
Start: 2019-02-09 | End: 2020-04-06

## 2019-02-13 RX ORDER — AMOXICILLIN 500 MG
1200 CAPSULE ORAL
COMMUNITY

## 2019-02-13 RX ORDER — GUARN/MA-HUANG/P.GIN/S.GINSENG
TABLET ORAL
COMMUNITY

## 2019-02-13 RX ORDER — ASPIRIN 81 MG/1
81 TABLET, CHEWABLE ORAL
COMMUNITY
End: 2021-04-09 | Stop reason: HOSPADM

## 2019-02-13 RX ORDER — SERTRALINE HYDROCHLORIDE 25 MG/1
25 TABLET, FILM COATED ORAL EVERY MORNING
Refills: 6 | COMMUNITY
Start: 2018-12-07 | End: 2020-06-19 | Stop reason: SDUPTHER

## 2019-02-13 RX ORDER — VANCOMYCIN HYDROCHLORIDE 125 MG/1
CAPSULE ORAL
Refills: 0 | COMMUNITY
Start: 2019-02-04 | End: 2020-05-19

## 2019-02-13 RX ORDER — CHOLECALCIFEROL (VITAMIN D3) 10 MCG
1 TABLET ORAL
COMMUNITY

## 2019-02-13 RX ORDER — SIMVASTATIN 10 MG
TABLET ORAL
COMMUNITY
Start: 2015-07-13 | End: 2020-05-19

## 2019-02-13 RX ORDER — PREDNISONE 10 MG/1
TABLET ORAL
COMMUNITY
Start: 2019-02-12 | End: 2020-05-19

## 2019-02-13 RX ORDER — BENAZEPRIL HYDROCHLORIDE 10 MG/1
10 TABLET ORAL DAILY
Refills: 1 | COMMUNITY
Start: 2019-01-12 | End: 2020-08-31 | Stop reason: ALTCHOICE

## 2019-02-13 RX ORDER — LISINOPRIL 20 MG/1
TABLET ORAL
COMMUNITY
Start: 2019-02-12 | End: 2020-09-10 | Stop reason: SDUPTHER

## 2019-02-13 NOTE — PROGRESS NOTES
[facility]        NAME: Rosie Chambers is a 82 y o  female  : 1935    MRN: [id number]  DATE: 2019  TIME: 6:22 PM    Assessment and Plan   Osteoarthritis of both shoulders, unspecified osteoarthritis type [M19 011, M19 012]  1  Osteoarthritis of both shoulders, unspecified osteoarthritis type  Ambulatory referral to Physical Therapy         Patient Instructions   Bilateral upper extremity pain may be secondary to osteoarthritis verses muscle spasm  Continue to take ibuprofen as previously prescribed for pain and inflammation  Alternate ice and heat therapy  Physical therapy referral provided  Follow up with PCP in 3-5 days  Proceed to  ER if symptoms worsen  Chief Complaint     Chief Complaint   Patient presents with    Arm Pain     Bilateral upper arm pain- pt denies that the pain is radiating anywhere else but her arms  She reports being very anxious over her most recent hospitalization         History of Present Illness   The patient is an 22-year-old female who presents with bilateral upper arm pain for several days  She states that she was recently hospitalized at [facility] for several days  She is currently being treated for C diff infection with oral vancomycin  She states that she noticed the arm pain shortly after being discharged  It is primarily of the anterior shoulder region  She does admit to chronic osteoarthritis  She states that the pain is achy with spasms  She denies any radiating pain  Negative numbness or tingling  Negative upper extremity weakness  Negative color change  She does have some ecchymosis and swelling of the bilateral antecubital region secondary to IV placement  She denies pain to her lower extremities or other portions of her body  She continues to have some diarrhea  Negative abdominal pain or vomiting  Negative fever or chills      HPI    Review of Systems   Review of Systems   Constitutional: Negative for activity change, chills and fever  Gastrointestinal: Positive for diarrhea  Negative for abdominal pain, nausea and vomiting  Musculoskeletal:        Bilateral upper arm/shoulder pain   Skin: Positive for color change and wound  Negative for pallor and rash  Neurological: Negative for dizziness, weakness, numbness and headaches  Psychiatric/Behavioral: The patient is nervous/anxious  All other systems reviewed and are negative          Current Medications       Current Outpatient Medications:     aspirin (ASPIRIN 81) 81 mg chewable tablet, Chew 81 mg, Disp: , Rfl:     atorvastatin (LIPITOR) 20 mg tablet, Take 20 mg by mouth daily, Disp: , Rfl: 1    b complex-vitamin C-folic acid (NEPHROCAPS) 1 mg capsule, Take 1 capsule by mouth, Disp: , Rfl:     benazepril (LOTENSIN) 10 mg tablet, Take 10 mg by mouth daily, Disp: , Rfl: 1    Calcium 600-200 MG-UNIT per tablet, Take by mouth, Disp: , Rfl:     lisinopril (ZESTRIL) 20 mg tablet, , Disp: , Rfl:     Omega-3 Fatty Acids (FISH OIL) 1200 MG CAPS, Take 1,200 mg by mouth, Disp: , Rfl:     predniSONE 10 mg tablet, , Disp: , Rfl:     sertraline (ZOLOFT) 25 mg tablet, Take 25 mg by mouth every morning, Disp: , Rfl: 6    vancomycin (VANCOCIN) 125 MG capsule, TAKE 1 CAPSULE(S) 4 TIMES A DAY BY ORAL ROUTE FOR 14 DAYS , Disp: , Rfl: 0    simvastatin (ZOCOR) 10 mg tablet, Daily, Disp: , Rfl:     Current Allergies     Allergies as of 02/13/2019    (No Known Allergies)            The following portions of the patient's history were reviewed and updated as appropriate: allergies, current medications, past family history, past medical history, past social history, past surgical history and problem list      Past Medical History:   Diagnosis Date    Depression     High cholesterol     Hypertension        Past Surgical History:   Procedure Laterality Date    EYE SURGERY      KNEE CARTILAGE SURGERY         Family History   Problem Relation Age of Onset    No Known Problems Mother     No Known Problems Father          Medications have been verified  Objective   /68   Pulse 100   Temp 97 7 °F (36 5 °C)   Resp 20   Ht 5' 2" (1 575 m)   Wt 75 8 kg (167 lb)   SpO2 96%   BMI 30 54 kg/m²        Physical Exam     Physical Exam   Constitutional: She is oriented to person, place, and time  She appears well-developed and well-nourished  No distress  HENT:   Head: Normocephalic and atraumatic  Eyes: Pupils are equal, round, and reactive to light  EOM are normal  Right eye exhibits no discharge  Left eye exhibits no discharge  No scleral icterus  Pulmonary/Chest: Effort normal  No respiratory distress  Musculoskeletal:        Right shoulder: She exhibits tenderness and spasm  She exhibits normal range of motion, no bony tenderness, no swelling, no effusion, no crepitus, no deformity, no laceration, no pain, normal pulse and normal strength  Left shoulder: She exhibits tenderness, pain and spasm  She exhibits normal range of motion, no bony tenderness, no swelling, no effusion, no crepitus, no deformity, no laceration, normal pulse and normal strength  The patient has bilateral anterior shoulder pain with palpation  Positive muscle spasm with palpation  Range of motion intact  Strength 5/5 upper extremities bilaterally  She has positive ecchymosis of her bilateral antecubital region secondary to IV/needle puncture  Negative color change of the bilateral upper extremities  Palpable radial pulse bilaterally  Sensation a bilateral hands intact  Good capillary refill  Neurological: She is alert and oriented to person, place, and time  No cranial nerve deficit or sensory deficit  Coordination normal    Skin: Skin is warm and dry  Capillary refill takes 2 to 3 seconds  She is not diaphoretic  Psychiatric: Her mood appears anxious

## 2019-02-13 NOTE — PATIENT INSTRUCTIONS
Bilateral upper extremity pain may be secondary to osteoarthritis verses muscle spasm  Continue to take ibuprofen as previously prescribed for pain and inflammation  Alternate ice and heat therapy  Physical therapy referral provided  Follow up with PCP in 3-5 days  Proceed to  ER if symptoms worsen

## 2019-04-13 ENCOUNTER — OFFICE VISIT (OUTPATIENT)
Dept: URGENT CARE | Facility: CLINIC | Age: 84
End: 2019-04-13
Payer: COMMERCIAL

## 2019-04-13 VITALS
OXYGEN SATURATION: 98 % | RESPIRATION RATE: 22 BRPM | WEIGHT: 166.4 LBS | DIASTOLIC BLOOD PRESSURE: 62 MMHG | HEART RATE: 89 BPM | TEMPERATURE: 98.8 F | BODY MASS INDEX: 30.62 KG/M2 | HEIGHT: 62 IN | SYSTOLIC BLOOD PRESSURE: 140 MMHG

## 2019-04-13 DIAGNOSIS — S70.11XA CONTUSION OF RIGHT THIGH, INITIAL ENCOUNTER: Primary | ICD-10-CM
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
## 2019-04-13 PROCEDURE — G0463 HOSPITAL OUTPT CLINIC VISIT: HCPCS | Performed by: NURSE PRACTITIONER

## 2019-04-13 PROCEDURE — 99213 OFFICE O/P EST LOW 20 MIN: CPT | Performed by: NURSE PRACTITIONER

## 2020-04-06 DIAGNOSIS — E78.5 HYPERLIPIDEMIA, UNSPECIFIED HYPERLIPIDEMIA TYPE: Primary | ICD-10-CM

## 2020-04-06 RX ORDER — ATORVASTATIN CALCIUM 20 MG/1
TABLET, FILM COATED ORAL
Qty: 90 TABLET | Refills: 1 | Status: SHIPPED | OUTPATIENT
Start: 2020-04-06 | End: 2021-01-04 | Stop reason: SDUPTHER

## 2020-05-18 ENCOUNTER — TELEPHONE (OUTPATIENT)
Dept: FAMILY MEDICINE CLINIC | Facility: CLINIC | Age: 85
End: 2020-05-18

## 2020-05-19 ENCOUNTER — OFFICE VISIT (OUTPATIENT)
Dept: FAMILY MEDICINE CLINIC | Facility: CLINIC | Age: 85
End: 2020-05-19
Payer: COMMERCIAL

## 2020-05-19 VITALS
BODY MASS INDEX: 31.28 KG/M2 | HEIGHT: 62 IN | DIASTOLIC BLOOD PRESSURE: 78 MMHG | HEART RATE: 79 BPM | RESPIRATION RATE: 16 BRPM | OXYGEN SATURATION: 97 % | WEIGHT: 170 LBS | SYSTOLIC BLOOD PRESSURE: 140 MMHG | TEMPERATURE: 97.6 F

## 2020-05-19 DIAGNOSIS — M62.830 SPASM OF MUSCLE OF LOWER BACK: ICD-10-CM

## 2020-05-19 DIAGNOSIS — M62.830 SPASM OF MUSCLE OF LOWER BACK: Primary | ICD-10-CM

## 2020-05-19 PROCEDURE — 1101F PT FALLS ASSESS-DOCD LE1/YR: CPT | Performed by: FAMILY MEDICINE

## 2020-05-19 PROCEDURE — 99213 OFFICE O/P EST LOW 20 MIN: CPT | Performed by: FAMILY MEDICINE

## 2020-05-19 PROCEDURE — 1160F RVW MEDS BY RX/DR IN RCRD: CPT | Performed by: FAMILY MEDICINE

## 2020-05-19 PROCEDURE — 1036F TOBACCO NON-USER: CPT | Performed by: FAMILY MEDICINE

## 2020-05-19 PROCEDURE — 3008F BODY MASS INDEX DOCD: CPT | Performed by: FAMILY MEDICINE

## 2020-05-19 PROCEDURE — 3288F FALL RISK ASSESSMENT DOCD: CPT | Performed by: FAMILY MEDICINE

## 2020-05-19 RX ORDER — NAPROXEN 500 MG/1
500 TABLET ORAL 2 TIMES DAILY WITH MEALS
Qty: 20 TABLET | Refills: 0 | Status: SHIPPED | OUTPATIENT
Start: 2020-05-19 | End: 2020-06-01

## 2020-05-19 RX ORDER — TIZANIDINE 2 MG/1
2 TABLET ORAL 3 TIMES DAILY
Qty: 20 TABLET | Refills: 0 | Status: SHIPPED | OUTPATIENT
Start: 2020-05-19 | End: 2020-05-27

## 2020-05-19 RX ORDER — TIZANIDINE 2 MG/1
2 TABLET ORAL EVERY 8 HOURS PRN
Qty: 20 TABLET | Refills: 0 | Status: SHIPPED | OUTPATIENT
Start: 2020-05-19 | End: 2020-05-19 | Stop reason: SDUPTHER

## 2020-05-19 RX ORDER — TRAMADOL HYDROCHLORIDE 50 MG/1
50 TABLET ORAL EVERY 8 HOURS PRN
Qty: 30 TABLET | Refills: 0 | Status: SHIPPED | OUTPATIENT
Start: 2020-05-19 | End: 2020-06-17

## 2020-05-22 ENCOUNTER — TELEPHONE (OUTPATIENT)
Dept: FAMILY MEDICINE CLINIC | Facility: CLINIC | Age: 85
End: 2020-05-22

## 2020-05-26 ENCOUNTER — TELEPHONE (OUTPATIENT)
Dept: FAMILY MEDICINE CLINIC | Facility: CLINIC | Age: 85
End: 2020-05-26

## 2020-05-27 ENCOUNTER — OFFICE VISIT (OUTPATIENT)
Dept: FAMILY MEDICINE CLINIC | Facility: CLINIC | Age: 85
End: 2020-05-27
Payer: COMMERCIAL

## 2020-05-27 VITALS
HEART RATE: 90 BPM | TEMPERATURE: 97.5 F | HEIGHT: 62 IN | DIASTOLIC BLOOD PRESSURE: 76 MMHG | OXYGEN SATURATION: 93 % | WEIGHT: 170 LBS | BODY MASS INDEX: 31.28 KG/M2 | SYSTOLIC BLOOD PRESSURE: 142 MMHG | RESPIRATION RATE: 16 BRPM

## 2020-05-27 DIAGNOSIS — M62.830 SPASM OF MUSCLE OF LOWER BACK: ICD-10-CM

## 2020-05-27 DIAGNOSIS — M62.830 SPASM OF MUSCLE OF LOWER BACK: Primary | ICD-10-CM

## 2020-05-27 PROCEDURE — 99213 OFFICE O/P EST LOW 20 MIN: CPT | Performed by: FAMILY MEDICINE

## 2020-05-27 PROCEDURE — 1036F TOBACCO NON-USER: CPT | Performed by: FAMILY MEDICINE

## 2020-05-27 PROCEDURE — 1160F RVW MEDS BY RX/DR IN RCRD: CPT | Performed by: FAMILY MEDICINE

## 2020-05-27 PROCEDURE — 3008F BODY MASS INDEX DOCD: CPT | Performed by: FAMILY MEDICINE

## 2020-05-27 RX ORDER — CYCLOBENZAPRINE HCL 5 MG
5 TABLET ORAL 3 TIMES DAILY
Qty: 30 TABLET | Refills: 0 | Status: SHIPPED | OUTPATIENT
Start: 2020-05-27 | End: 2020-06-01

## 2020-05-27 RX ORDER — METHOCARBAMOL 500 MG/1
500 TABLET, FILM COATED ORAL 3 TIMES DAILY
Qty: 30 TABLET | Refills: 0 | Status: SHIPPED | OUTPATIENT
Start: 2020-05-27 | End: 2020-05-27

## 2020-05-27 RX ORDER — CYCLOBENZAPRINE HCL 5 MG
5 TABLET ORAL 3 TIMES DAILY
Qty: 30 TABLET | Refills: 0 | Status: SHIPPED | OUTPATIENT
Start: 2020-05-27 | End: 2020-05-27 | Stop reason: SDUPTHER

## 2020-05-28 ENCOUNTER — TELEPHONE (OUTPATIENT)
Dept: FAMILY MEDICINE CLINIC | Facility: CLINIC | Age: 85
End: 2020-05-28

## 2020-05-29 ENCOUNTER — OFFICE VISIT (OUTPATIENT)
Dept: URGENT CARE | Facility: CLINIC | Age: 85
End: 2020-05-29
Payer: COMMERCIAL

## 2020-05-29 ENCOUNTER — APPOINTMENT (OUTPATIENT)
Dept: RADIOLOGY | Facility: CLINIC | Age: 85
End: 2020-05-29
Payer: COMMERCIAL

## 2020-05-29 VITALS
OXYGEN SATURATION: 95 % | DIASTOLIC BLOOD PRESSURE: 73 MMHG | TEMPERATURE: 98.3 F | RESPIRATION RATE: 16 BRPM | HEART RATE: 85 BPM | SYSTOLIC BLOOD PRESSURE: 166 MMHG | HEIGHT: 62 IN | WEIGHT: 168 LBS | BODY MASS INDEX: 30.91 KG/M2

## 2020-05-29 DIAGNOSIS — M54.50 ACUTE RIGHT-SIDED LOW BACK PAIN WITHOUT SCIATICA: Primary | ICD-10-CM

## 2020-05-29 PROCEDURE — 99213 OFFICE O/P EST LOW 20 MIN: CPT | Performed by: PHYSICIAN ASSISTANT

## 2020-05-29 PROCEDURE — S9083 URGENT CARE CENTER GLOBAL: HCPCS | Performed by: PHYSICIAN ASSISTANT

## 2020-05-29 PROCEDURE — 72110 X-RAY EXAM L-2 SPINE 4/>VWS: CPT

## 2020-05-29 RX ORDER — METHOCARBAMOL 500 MG/1
500 TABLET, FILM COATED ORAL 4 TIMES DAILY
COMMUNITY
End: 2020-06-17

## 2020-05-29 RX ORDER — IBUPROFEN 600 MG/1
600 TABLET ORAL EVERY 8 HOURS PRN
Qty: 30 TABLET | Refills: 0 | Status: SHIPPED | OUTPATIENT
Start: 2020-05-29 | End: 2020-06-17

## 2020-06-01 ENCOUNTER — OFFICE VISIT (OUTPATIENT)
Dept: FAMILY MEDICINE CLINIC | Facility: CLINIC | Age: 85
End: 2020-06-01
Payer: COMMERCIAL

## 2020-06-01 VITALS
HEIGHT: 62 IN | OXYGEN SATURATION: 95 % | WEIGHT: 168 LBS | DIASTOLIC BLOOD PRESSURE: 82 MMHG | TEMPERATURE: 98.2 F | BODY MASS INDEX: 30.91 KG/M2 | HEART RATE: 80 BPM | RESPIRATION RATE: 16 BRPM | SYSTOLIC BLOOD PRESSURE: 130 MMHG

## 2020-06-01 DIAGNOSIS — M62.830 SPASM OF MUSCLE OF LOWER BACK: Primary | ICD-10-CM

## 2020-06-01 PROCEDURE — 96372 THER/PROPH/DIAG INJ SC/IM: CPT | Performed by: FAMILY MEDICINE

## 2020-06-01 PROCEDURE — 1036F TOBACCO NON-USER: CPT | Performed by: FAMILY MEDICINE

## 2020-06-01 PROCEDURE — 3008F BODY MASS INDEX DOCD: CPT | Performed by: FAMILY MEDICINE

## 2020-06-01 PROCEDURE — 1160F RVW MEDS BY RX/DR IN RCRD: CPT | Performed by: FAMILY MEDICINE

## 2020-06-01 PROCEDURE — 99213 OFFICE O/P EST LOW 20 MIN: CPT | Performed by: FAMILY MEDICINE

## 2020-06-01 RX ORDER — METHYLPREDNISOLONE SODIUM SUCCINATE 125 MG/2ML
125 INJECTION, POWDER, LYOPHILIZED, FOR SOLUTION INTRAMUSCULAR; INTRAVENOUS ONCE
Status: COMPLETED | OUTPATIENT
Start: 2020-06-01 | End: 2020-06-01

## 2020-06-01 RX ORDER — KETOROLAC TROMETHAMINE 30 MG/ML
60 INJECTION, SOLUTION INTRAMUSCULAR; INTRAVENOUS ONCE
Status: COMPLETED | OUTPATIENT
Start: 2020-06-01 | End: 2020-06-01

## 2020-06-01 RX ORDER — HYDROCODONE BITARTRATE AND ACETAMINOPHEN 5; 325 MG/1; MG/1
1 TABLET ORAL EVERY 6 HOURS PRN
Qty: 30 TABLET | Refills: 0 | Status: SHIPPED | OUTPATIENT
Start: 2020-06-01 | End: 2020-06-17

## 2020-06-01 RX ADMIN — METHYLPREDNISOLONE SODIUM SUCCINATE 125 MG: 125 INJECTION, POWDER, LYOPHILIZED, FOR SOLUTION INTRAMUSCULAR; INTRAVENOUS at 09:57

## 2020-06-01 RX ADMIN — KETOROLAC TROMETHAMINE 60 MG: 30 INJECTION, SOLUTION INTRAMUSCULAR; INTRAVENOUS at 09:56

## 2020-06-04 ENCOUNTER — EVALUATION (OUTPATIENT)
Dept: PHYSICAL THERAPY | Facility: CLINIC | Age: 85
End: 2020-06-04
Payer: COMMERCIAL

## 2020-06-04 DIAGNOSIS — M62.830 SPASM OF MUSCLE OF LOWER BACK: ICD-10-CM

## 2020-06-04 PROCEDURE — 97162 PT EVAL MOD COMPLEX 30 MIN: CPT | Performed by: PHYSICAL THERAPIST

## 2020-06-10 ENCOUNTER — OFFICE VISIT (OUTPATIENT)
Dept: PHYSICAL THERAPY | Facility: CLINIC | Age: 85
End: 2020-06-10
Payer: COMMERCIAL

## 2020-06-10 DIAGNOSIS — M62.830 SPASM OF MUSCLE OF LOWER BACK: Primary | ICD-10-CM

## 2020-06-10 PROCEDURE — 97140 MANUAL THERAPY 1/> REGIONS: CPT

## 2020-06-10 PROCEDURE — 97112 NEUROMUSCULAR REEDUCATION: CPT

## 2020-06-12 ENCOUNTER — OFFICE VISIT (OUTPATIENT)
Dept: PHYSICAL THERAPY | Facility: CLINIC | Age: 85
End: 2020-06-12
Payer: COMMERCIAL

## 2020-06-12 DIAGNOSIS — M62.830 SPASM OF MUSCLE OF LOWER BACK: Primary | ICD-10-CM

## 2020-06-12 PROCEDURE — 97112 NEUROMUSCULAR REEDUCATION: CPT

## 2020-06-12 PROCEDURE — 97010 HOT OR COLD PACKS THERAPY: CPT

## 2020-06-12 PROCEDURE — 97140 MANUAL THERAPY 1/> REGIONS: CPT

## 2020-06-16 DIAGNOSIS — M62.830 SPASM OF MUSCLE OF LOWER BACK: Primary | ICD-10-CM

## 2020-06-17 ENCOUNTER — OFFICE VISIT (OUTPATIENT)
Dept: OBGYN CLINIC | Facility: CLINIC | Age: 85
End: 2020-06-17
Payer: COMMERCIAL

## 2020-06-17 ENCOUNTER — TELEPHONE (OUTPATIENT)
Dept: OBGYN CLINIC | Facility: HOSPITAL | Age: 85
End: 2020-06-17

## 2020-06-17 ENCOUNTER — APPOINTMENT (OUTPATIENT)
Dept: PHYSICAL THERAPY | Facility: CLINIC | Age: 85
End: 2020-06-17
Payer: COMMERCIAL

## 2020-06-17 VITALS
SYSTOLIC BLOOD PRESSURE: 176 MMHG | HEART RATE: 89 BPM | DIASTOLIC BLOOD PRESSURE: 74 MMHG | WEIGHT: 166 LBS | HEIGHT: 62 IN | BODY MASS INDEX: 30.55 KG/M2

## 2020-06-17 DIAGNOSIS — M62.830 SPASM OF MUSCLE OF LOWER BACK: ICD-10-CM

## 2020-06-17 DIAGNOSIS — G89.29 CHRONIC BILATERAL LOW BACK PAIN WITHOUT SCIATICA: ICD-10-CM

## 2020-06-17 DIAGNOSIS — M47.816 FACET HYPERTROPHY OF LUMBAR REGION: Primary | ICD-10-CM

## 2020-06-17 DIAGNOSIS — M54.50 CHRONIC BILATERAL LOW BACK PAIN WITHOUT SCIATICA: ICD-10-CM

## 2020-06-17 PROCEDURE — 99203 OFFICE O/P NEW LOW 30 MIN: CPT | Performed by: PHYSICAL MEDICINE & REHABILITATION

## 2020-06-17 PROCEDURE — 1160F RVW MEDS BY RX/DR IN RCRD: CPT | Performed by: PHYSICAL MEDICINE & REHABILITATION

## 2020-06-17 RX ORDER — MELOXICAM 7.5 MG/1
7.5 TABLET ORAL DAILY PRN
Qty: 60 TABLET | Refills: 0 | Status: SHIPPED | OUTPATIENT
Start: 2020-06-17 | End: 2020-08-08

## 2020-06-17 RX ORDER — LIDOCAINE 50 MG/G
1 PATCH TOPICAL DAILY
Qty: 15 PATCH | Refills: 1 | Status: SHIPPED | OUTPATIENT
Start: 2020-06-17 | End: 2021-03-04 | Stop reason: ALTCHOICE

## 2020-06-19 DIAGNOSIS — F32.A DEPRESSION, UNSPECIFIED DEPRESSION TYPE: Primary | ICD-10-CM

## 2020-06-19 RX ORDER — SERTRALINE HYDROCHLORIDE 25 MG/1
25 TABLET, FILM COATED ORAL EVERY MORNING
Qty: 90 TABLET | Refills: 1 | Status: SHIPPED | OUTPATIENT
Start: 2020-06-19 | End: 2020-12-13 | Stop reason: SDUPTHER

## 2020-06-22 ENCOUNTER — TELEPHONE (OUTPATIENT)
Dept: OBGYN CLINIC | Facility: HOSPITAL | Age: 85
End: 2020-06-22

## 2020-06-24 ENCOUNTER — OFFICE VISIT (OUTPATIENT)
Dept: PHYSICAL THERAPY | Facility: CLINIC | Age: 85
End: 2020-06-24
Payer: COMMERCIAL

## 2020-06-24 DIAGNOSIS — M62.830 SPASM OF MUSCLE OF LOWER BACK: Primary | ICD-10-CM

## 2020-06-24 PROCEDURE — 97112 NEUROMUSCULAR REEDUCATION: CPT

## 2020-07-01 ENCOUNTER — EVALUATION (OUTPATIENT)
Dept: PHYSICAL THERAPY | Facility: CLINIC | Age: 85
End: 2020-07-01
Payer: COMMERCIAL

## 2020-07-01 DIAGNOSIS — M62.830 SPASM OF MUSCLE OF LOWER BACK: Primary | ICD-10-CM

## 2020-07-01 PROCEDURE — 97110 THERAPEUTIC EXERCISES: CPT | Performed by: PHYSICAL THERAPIST

## 2020-07-01 PROCEDURE — 97140 MANUAL THERAPY 1/> REGIONS: CPT | Performed by: PHYSICAL THERAPIST

## 2020-07-01 NOTE — PROGRESS NOTES
PT Discharge    Today's date: 2020  Patient name: Ander Mercedes  : 1935  MRN: [id number]  Referring provider: Emma Dunbar MD  Dx:   Encounter Diagnosis     ICD-10-CM    1  Spasm of muscle of lower back M62 830        Start Time:   Stop Time:   Total time in clinic (min): 33 minutes    Assessment  Assessment details: Ander Mercedes is a 82 y o  female who has been consistent with attending and participating in skilled physical therapy sessions  The patient notes that her pain has decreased and she has been pain-free for over 1 week  She was able to demonstrate increased lumbar ROM, being able to demonstrate full motion in both flexion and extension  She is independent with her HEP and feels confident with discharging from physical therapy sessions at this time  If Mila Dhaliwal were to need physical therapy in the future, we would be happy to share in her care  Impairments: abnormal muscle firing, abnormal or restricted ROM, activity intolerance, impaired physical strength, lacks appropriate home exercise program and pain with function  Understanding of Dx/Px/POC: good   Prognosis: good    Goals  Impairment Goals:  1  Patient will decrease complaints of pain by 50% at worst in 4 weeks - MET  2  Patient will increase lumbar ROM to full in 4 weeks - MET    Functional Goals:  1  Patient will be independent with HEP by discharge - MET  2  Patient will increase FOTO to average norms by discharge - MET  3  Patient will be able to ambulate for over 10 minutes with decreased pain in 4 weeks - MET  4  Patient will be able to sit for over 10 minutes with decreased pain in 4 weeks - MET  5    Patient will return to prior baseline function by discharge - MET      Plan  Patient would benefit from: skilled physical therapy  Planned modality interventions: cryotherapy and thermotherapy: hydrocollator packs  Planned therapy interventions: abdominal trunk stabilization, flexibility, functional ROM exercises, graded activity, graded exercise, home exercise program, therapeutic exercise, therapeutic activities, stretching, strengthening, patient education, neuromuscular re-education, Mckeon taping, massage, manual therapy and joint mobilization  Frequency: 2x week  Duration in weeks: 4  Treatment plan discussed with: patient        Subjective Evaluation    History of Present Illness  Mechanism of injury: Patient notes that she has been pain-free since then  She notes that she has been able to increase her activity, and notes that she has been able to ambulate for longer periods of time as well as sit for prolonged periods of time  Patient reports that she is back to her prior baseline function  HPI:  Patient reports having back pain spanning over 1 month  The patient notes insidious onset of lower back pain  The patient has been given different pain medications from her PCP and also received an injection on 20  X-rays were completed which were (-) for any pathology  She was then referred to physical therapy  Patient does note report any radicular symptoms  Pain Location:  R sided lumbar spine  Occupation:  Retired  Prior Functional Limitations:   Independent prior  AGG:  Prolonged ambulation, prolonged sitting  Ease:  No easing factors  Patient Goals:  "Make the pain stop"    Pain  Current pain ratin  At best pain ratin  At worst pain ratin  Quality: dull ache          Objective     Concurrent Complaints  Negative for night pain, disturbed sleep, bladder dysfunction, bowel dysfunction and saddle (S4) numbness    Tenderness     Additional Tenderness Details  Tenderness to palpation R Lumbar paraspinals     Active Range of Motion     Additional Active Range of Motion Details  Flexion - 100% w/o pain  Extension - 100% w/o pain    Strength/Myotome Testing     Left Hip   Planes of Motion   Flexion: 5  Abduction: 4+    Right Hip   Planes of Motion   Flexion: 5  Abduction: 4+    Left Knee   Extension: 5    Right Knee   Extension: 5    Left Ankle/Foot   Dorsiflexion: 5    Right Ankle/Foot   Dorsiflexion: 5      Flowsheet Rows      Most Recent Value   PT/OT G-Codes   Current Score  94   Projected Score  68          Diagnosis:    Precautions:    Manuals 6/10  6/12 6/24 7/1    PROM  Hip flexion  Held      Mobs        STM  Right gluteus                Re-Evaluatoin    RT, PT    Ther- Ex        Lumbar 3 way rollouts        LTR                        Review of HEP    RT, PT            Neuro Re-Ed        TrA Sets Standing with OSB: 2x10  Standing with OSB: 2x10  Standing with OSB: 2x10      Clamshells (supine) 10x   Red, 2x10      Pull Overs  Red, 2x10  [place], 2x10      Glute Sets 10x standing  20x standing       Side steps  3 laps 3 laps at mirror  3 laps at mirror      Standing hip extension  10x ea       Lacrosse ball on wall  1 min        Piriformis Stretch   5x 20 sec       Mini squats   5x      Bridges   2x10                                     Ther Act                                         Modalities             CP PRN          MHP     10 mins

## 2020-07-15 ENCOUNTER — OFFICE VISIT (OUTPATIENT)
Dept: OBGYN CLINIC | Facility: CLINIC | Age: 85
End: 2020-07-15
Payer: COMMERCIAL

## 2020-07-15 VITALS
HEIGHT: 62 IN | BODY MASS INDEX: 30.55 KG/M2 | HEART RATE: 91 BPM | WEIGHT: 166 LBS | SYSTOLIC BLOOD PRESSURE: 175 MMHG | DIASTOLIC BLOOD PRESSURE: 74 MMHG

## 2020-07-15 DIAGNOSIS — G89.29 CHRONIC BILATERAL LOW BACK PAIN WITHOUT SCIATICA: Primary | ICD-10-CM

## 2020-07-15 DIAGNOSIS — M62.830 SPASM OF MUSCLE OF LOWER BACK: ICD-10-CM

## 2020-07-15 DIAGNOSIS — M54.50 CHRONIC BILATERAL LOW BACK PAIN WITHOUT SCIATICA: Primary | ICD-10-CM

## 2020-07-15 PROCEDURE — 99213 OFFICE O/P EST LOW 20 MIN: CPT | Performed by: PHYSICAL MEDICINE & REHABILITATION

## 2020-07-15 PROCEDURE — 1036F TOBACCO NON-USER: CPT | Performed by: PHYSICAL MEDICINE & REHABILITATION

## 2020-07-15 PROCEDURE — 3008F BODY MASS INDEX DOCD: CPT | Performed by: PHYSICAL MEDICINE & REHABILITATION

## 2020-07-15 PROCEDURE — 1160F RVW MEDS BY RX/DR IN RCRD: CPT | Performed by: PHYSICAL MEDICINE & REHABILITATION

## 2020-07-15 NOTE — PATIENT INSTRUCTIONS
If you have pain in your low back, you should try the lidoderm/lidocaine patch  If this does not help you can try Tylenol (2 tablets which equals 1000mg) every 8 hours as needed  If these two medications do not work, you can take Meloxicam up to once a day as needed with food  If this does not help your pain for a few days, please call our office to be evaluated again

## 2020-07-15 NOTE — PROGRESS NOTES
1  Chronic bilateral low back pain without sciatica     2  Spasm of muscle of lower back       No orders of the defined types were placed in this encounter  Imaging Studies (I personally reviewed images in PACS and report if it was available):  Lumbar spine x-rays that are most recent to this encounter were reviewed  These images show "There are 5 non rib bearing lumbar vertebral bodies  There is no evidence of acute fracture or destructive osseous lesion  Grade 1 anterolisthesis of L4 on L5 is present  Mild multilevel degenerative disc disease is present throughout the lumbar spine  The pedicles appear intact  There are atherosclerotic calcifications  Soft tissues are otherwise unremarkable "     Impression:  The patient's pain is multifactorial and is predominantly due to facet hypertrophy and right sacroiliac joint dysfunction with spondylosis  Patient presents and follow-up today after being discharged from physical therapy because she was doing quite well  Patient is actually doing quite well although she did not seem to feel that way initially  She no longer has tenderness along the right sacroiliac joint  She remains restricted with lumbar extension which is likely due to the facet hypertrophy  She is neurologically intact  She can continue with the Lidoderm patch, Tylenol and meloxicam as needed  I put into her patient instructions how she should take these medications and should try to use Lidoderm patch more than the 2 other medications  If her pain returns, we will see her back and can consider a right sacroiliac joint steroid injection  I will see her back if needed  Return if symptoms worsen or fail to improve  HPI:  Wilma Cheung is a 82 y o  female  who presents in follow up  Here for   Chief Complaint   Patient presents with    Follow-up       Date of injury:  Chronic  Trajectory of symptoms: She feels that things have not changed much since her last visit    She feels that home exercises are better than actual physical therapy  The lidocaine patch helps her a lot  She takes meloxicam everyday and sometimes take it twice a day  She also takes Tylenol which helps  Review of Systems   Constitutional: Positive for activity change  Negative for fever  HENT: Negative for trouble swallowing  Eyes: Negative for visual disturbance  Respiratory: Negative for shortness of breath  Cardiovascular: Negative for chest pain  Gastrointestinal: Negative for abdominal pain  Denies bowel incontinence  Endocrine: Negative for polydipsia  Genitourinary:        Denies urinary incontinence  Musculoskeletal: Positive for back pain  Negative for gait problem  Skin: Negative for rash  Allergic/Immunologic: Negative for immunocompromised state  Neurological: Negative for weakness and numbness  Denies saddle anesthesia  Hematological: Does not bruise/bleed easily  Psychiatric/Behavioral: Negative for confusion         Following history reviewed and updated:  Past Medical History:   Diagnosis Date    Aortic valve stenosis     Depression     H/O echocardiogram 08/2019    High cholesterol     Hyperlipidemia     Hypertension     Hypothyroidism     Insomnia     Mitral valve regurgitation      Past Surgical History:   Procedure Laterality Date    CARDIAC ELECTROPHYSIOLOGY PROCEDURE  09/2019    CATARACT EXTRACTION, BILATERAL  2016    COLONOSCOPY  2019    fecal implant    EYE SURGERY      KNEE CARTILAGE SURGERY      KNEE SURGERY Left     menuscus torn/repaired    MAMMO (HISTORICAL)  2017    [facility]     Social History   Social History     Substance and Sexual Activity   Alcohol Use Never    Frequency: Never     Social History     Substance and Sexual Activity   Drug Use Never     Social History     Tobacco Use   Smoking Status Former Smoker    Packs/day: 1 00    Years: 50 00    Pack years: 50 00    Last attempt to quit: 1997    Years since quittin 5   Smokeless Tobacco Never Used     Family History   Problem Relation Age of Onset   Lucien Dunbar Sudden death Mother         cardiac    Hypertension Mother     Stroke Father     Hypertension Sister     Breast cancer Sister      Allergies   Allergen Reactions    Amoxicillin Diarrhea        Constitutional:  BP (!) 175/74   Pulse 91   Ht 5' 2" (1 575 m)   Wt 75 3 kg (166 lb)   BMI 30 36 kg/m²    General: NAD  Eyes: Clear sclerae  ENT: No inflammation, lesion, or mass of lips  No tracheal deviation  Musculoskeletal: As mentioned below  Integumentary: No visible rashes or skin lesions  Pulmonary/Chest: Effort normal  No respiratory distress  Neuro: CN's grossly intact, BAPTISTE  Psych: Normal affect and judgement  Vascular: WWP  Back Exam     Tenderness   The patient is experiencing tenderness in the sacroiliac  Range of Motion   Extension: abnormal   Flexion: normal     Muscle Strength   The patient has normal back strength  Other   Sensation: normal  Gait: antalgic   Erythema: no back redness  Scars: absent             Procedures - none for this visit  Portions of the record may have been created with voice recognition software  Occasional wrong word or "sound a like" substitutions may have occurred due to the inherent limitations of voice recognition software  Read the chart carefully and recognize, using context, where substitutions have occurred

## 2020-08-01 ENCOUNTER — OFFICE VISIT (OUTPATIENT)
Dept: URGENT CARE | Facility: CLINIC | Age: 85
End: 2020-08-01
Payer: COMMERCIAL

## 2020-08-01 VITALS
DIASTOLIC BLOOD PRESSURE: 76 MMHG | RESPIRATION RATE: 16 BRPM | SYSTOLIC BLOOD PRESSURE: 179 MMHG | BODY MASS INDEX: 30.91 KG/M2 | WEIGHT: 168 LBS | HEART RATE: 78 BPM | HEIGHT: 62 IN | OXYGEN SATURATION: 93 % | TEMPERATURE: 98.8 F

## 2020-08-01 DIAGNOSIS — H00.012 HORDEOLUM EXTERNUM OF RIGHT LOWER EYELID: Primary | ICD-10-CM

## 2020-08-01 PROCEDURE — 99213 OFFICE O/P EST LOW 20 MIN: CPT | Performed by: FAMILY MEDICINE

## 2020-08-01 PROCEDURE — S9083 URGENT CARE CENTER GLOBAL: HCPCS | Performed by: FAMILY MEDICINE

## 2020-08-01 RX ORDER — POLYMYXIN B SULFATE AND TRIMETHOPRIM 1; 10000 MG/ML; [USP'U]/ML
1 SOLUTION OPHTHALMIC EVERY 4 HOURS
Qty: 10 ML | Refills: 0 | Status: SHIPPED | OUTPATIENT
Start: 2020-08-01 | End: 2020-08-04

## 2020-08-01 NOTE — PROGRESS NOTES
[facility]        NAME: Lo Rodriguez is a 82 y o  female  : 1935    MRN: [id number]  DATE: 2020  TIME: 10:48 AM    Assessment and Plan   Hordeolum externum of right lower eyelid [H00 012]  1  Hordeolum externum of right lower eyelid  polymyxin b-trimethoprim (POLYTRIM) ophthalmic solution     Advised on frequent warm compress over the right eye  If ineffective over the next 2-3 days, instructed to fill antibiotic prescription  Patient Instructions     Follow up with PCP in 3-5 days  Proceed to  ER if symptoms worsen  Chief Complaint     Chief Complaint   Patient presents with    Eye Problem     Pt states that she tried to get something out og hrt right eye and now it is swollen and irritated  History of Present Illness       68-year-old female presents today due to right lower eyelid redness which started this morning upon waking up  Also feels a small mass within the same area  Denies any visual disturbance, pain or conjunctival injection  No fevers, chills, dyspnea or chest pain  Review of Systems   Review of Systems   Constitutional: Negative for chills and fever  Eyes: Negative for pain, discharge, redness and visual disturbance  Respiratory: Negative for cough and shortness of breath  Cardiovascular: Negative for chest pain  Gastrointestinal: Negative for abdominal pain and nausea  Neurological: Positive for dizziness (chronic)  Negative for headaches           Current Medications       Current Outpatient Medications:     aspirin (ASPIRIN 81) 81 mg chewable tablet, Chew 81 mg, Disp: , Rfl:     atorvastatin (LIPITOR) 20 mg tablet, TAKE 1 TABLET BY MOUTH EVERY DAY, Disp: 90 tablet, Rfl: 1    b complex-vitamin C-folic acid (NEPHROCAPS) 1 mg capsule, Take 1 capsule by mouth, Disp: , Rfl:     benazepril (LOTENSIN) 10 mg tablet, Take 10 mg by mouth daily, Disp: , Rfl: 1    Calcium 600-200 MG-UNIT per tablet, Take by mouth, Disp: , Rfl:    lidocaine (LIDODERM) 5 %, Apply 1 patch topically daily Remove & Discard patch within 12 hours or as directed by DO, Disp: 15 patch, Rfl: 1    lisinopril (ZESTRIL) 20 mg tablet, , Disp: , Rfl:     meloxicam (MOBIC) 7 5 mg tablet, Take 1 tablet (7 5 mg total) by mouth daily as needed for moderate pain, Disp: 60 tablet, Rfl: 0    Omega-3 Fatty Acids (FISH OIL) 1200 MG CAPS, Take 1,200 mg by mouth, Disp: , Rfl:     sertraline (ZOLOFT) 25 mg tablet, Take 1 tablet (25 mg total) by mouth every morning, Disp: 90 tablet, Rfl: 1    polymyxin b-trimethoprim (POLYTRIM) ophthalmic solution, Administer 1 drop to the right eye every 4 (four) hours for 3 days, Disp: 10 mL, Rfl: 0    Current Allergies     Allergies as of 08/01/2020 - Reviewed 08/01/2020   Allergen Reaction Noted    Amoxicillin Diarrhea 05/19/2020            The following portions of the patient's history were reviewed and updated as appropriate: allergies, current medications, past family history, past medical history, past social history, past surgical history and problem list      Past Medical History:   Diagnosis Date    Aortic valve stenosis     Depression     H/O echocardiogram 08/2019    High cholesterol     Hyperlipidemia     Hypertension     Hypothyroidism     Insomnia     Mitral valve regurgitation        Past Surgical History:   Procedure Laterality Date    CARDIAC ELECTROPHYSIOLOGY PROCEDURE  09/2019    CATARACT EXTRACTION, BILATERAL  2016    COLONOSCOPY  2019    fecal implant    EYE SURGERY      KNEE CARTILAGE SURGERY      KNEE SURGERY Left     menuscus torn/repaired    MAMMO (HISTORICAL)  2017    [address] [facility]       Family History   Problem Relation Age of Onset    Sudden death Mother         cardiac    Hypertension Mother     Stroke Father     Hypertension Sister     Breast cancer Sister          Medications have been verified          Objective   BP (!) 179/76   Pulse 78   Temp 98 8 °F (37 1 °C)   Resp 16   Ht 5' 2" (1 575 m)   Wt 76 2 kg (168 lb)   SpO2 93%   BMI 30 73 kg/m²        Physical Exam     Physical Exam   Constitutional: She is oriented to person, place, and time  She appears well-developed and well-nourished  She appears distressed (Eye irritation)  HENT:   Head: Normocephalic and atraumatic  Eyes: Pupils are equal, round, and reactive to light  Conjunctivae are normal  Right eye exhibits no discharge  Left eye exhibits no discharge  No scleral icterus  Right lower eyelid stye near the medial canthus  Pulmonary/Chest: Effort normal    Neurological: She is alert and oriented to person, place, and time  Skin: Skin is warm  She is not diaphoretic  There is erythema  Psychiatric: She has a normal mood and affect  Her behavior is normal  Judgment and thought content normal    Nursing note and vitals reviewed

## 2020-08-07 ENCOUNTER — TELEPHONE (OUTPATIENT)
Dept: ADMINISTRATIVE | Facility: OTHER | Age: 85
End: 2020-08-07

## 2020-08-07 NOTE — TELEPHONE ENCOUNTER
----- Message from Marielle Paz MA sent at 8/7/2020 11:15 AM EDT -----  Regarding: Medicare AWV  08/07/20 11:16 AM    Hello, our patient Nickolas Trevizo has had Medicare AWV completed/performed  Please assist in updating the patient chart by pulling the document from the Media Tab  The date of service is 08/21/2019       Thank you,  Marielle Paz MA  [facility]

## 2020-08-08 DIAGNOSIS — M47.816 FACET HYPERTROPHY OF LUMBAR REGION: ICD-10-CM

## 2020-08-08 RX ORDER — MELOXICAM 7.5 MG/1
7.5 TABLET ORAL DAILY PRN
Qty: 60 TABLET | Refills: 0 | Status: SHIPPED | OUTPATIENT
Start: 2020-08-08 | End: 2020-08-31 | Stop reason: ALTCHOICE

## 2020-08-10 ENCOUNTER — TELEPHONE (OUTPATIENT)
Dept: ADMINISTRATIVE | Facility: OTHER | Age: 85
End: 2020-08-10

## 2020-08-10 NOTE — TELEPHONE ENCOUNTER
----- Message from Madison Snow MA sent at 8/10/2020  9:06 AM EDT -----  Regarding: Medicare AWV  08/10/20 9:06 AM    Hello, our patient Kayleigh Funes has had Medicare AWV completed/performed  Please assist in updating the patient chart by pulling the document from the Media Tab  The date of service is 08/21/2019       Thank you,  Madison Snow MA  [facility]

## 2020-08-10 NOTE — TELEPHONE ENCOUNTER
Upon review of the In Basket request we were able to locate, review, and update the patient chart as requested for Medicare AW  Any additional questions or concerns should be emailed to the Practice Liaisons via [email or web address], please do not reply via In Basket      Thank you  Shane Hebert, [address] [place]

## 2020-08-12 NOTE — TELEPHONE ENCOUNTER
Upon review of your request/inquiry, we have found this is a duplicate request and no further action is needed  This request was completed upon initial request, the patient chart is up to date, and this message will now be closed  Any additional questions or concerns should be emailed to the Practice Liaisons via [email or web address] email, please do not reply via In Basket       Thank you  Heidy Otero MA

## 2020-08-31 ENCOUNTER — OFFICE VISIT (OUTPATIENT)
Dept: FAMILY MEDICINE CLINIC | Facility: CLINIC | Age: 85
End: 2020-08-31
Payer: COMMERCIAL

## 2020-08-31 VITALS
HEART RATE: 93 BPM | HEIGHT: 62 IN | WEIGHT: 168 LBS | OXYGEN SATURATION: 97 % | RESPIRATION RATE: 16 BRPM | DIASTOLIC BLOOD PRESSURE: 80 MMHG | TEMPERATURE: 98.6 F | SYSTOLIC BLOOD PRESSURE: 176 MMHG | BODY MASS INDEX: 30.91 KG/M2

## 2020-08-31 DIAGNOSIS — M79.672 FOOT PAIN, LEFT: ICD-10-CM

## 2020-08-31 DIAGNOSIS — I10 ESSENTIAL HYPERTENSION: Primary | ICD-10-CM

## 2020-08-31 DIAGNOSIS — R42 VERTIGO: ICD-10-CM

## 2020-08-31 PROCEDURE — 1160F RVW MEDS BY RX/DR IN RCRD: CPT | Performed by: FAMILY MEDICINE

## 2020-08-31 PROCEDURE — 99213 OFFICE O/P EST LOW 20 MIN: CPT | Performed by: FAMILY MEDICINE

## 2020-08-31 PROCEDURE — 1036F TOBACCO NON-USER: CPT | Performed by: FAMILY MEDICINE

## 2020-08-31 PROCEDURE — 3008F BODY MASS INDEX DOCD: CPT | Performed by: FAMILY MEDICINE

## 2020-08-31 RX ORDER — AMLODIPINE BESYLATE 2.5 MG/1
2.5 TABLET ORAL DAILY
Qty: 30 TABLET | Refills: 5 | Status: SHIPPED | OUTPATIENT
Start: 2020-08-31

## 2020-08-31 RX ORDER — DIAZEPAM 5 MG/1
5 TABLET ORAL DAILY
Qty: 30 TABLET | Refills: 1 | Status: SHIPPED | OUTPATIENT
Start: 2020-08-31 | End: 2021-03-04 | Stop reason: ALTCHOICE

## 2020-08-31 NOTE — PROGRESS NOTES
Assessment/Plan:    No problem-specific Assessment & Plan notes found for this encounter  So add norasc for htn   Then after 1 week   Lets try adding valium for dizziness   I just dont want to add both meds at the same time incase she gets a SE       Epsom salt soaks     Problem List Items Addressed This Visit     None      Visit Diagnoses     Essential hypertension    -  Primary    Relevant Medications    amLODIPine (NORVASC) 2 5 mg tablet    Vertigo        Relevant Medications    diazepam (VALIUM) 5 mg tablet    Foot pain, left           Subjective:      Patient ID: Ander Mercedes is a 82 y o  female  Back Pain   Pertinent negatives include no abdominal pain, chest pain, dysuria or fever  Toe Pain         MJ has been off an on with resistant C diff  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  and needs repeat colonoscopy in 3-4months  the only complaint is that her dizziness has worsened since this whole episode  fine when sitting and worse when standing up  finds she does haveto hold onto something sometimes or she might fall  does have frontal sinus pressure and has not had a CT of the sinuses although has hx of chronic recurrent sinus issues for which the abx lead the to Cdiff issues    AS - mild with mod AR on most recent echo  has urinary incontinance 8-12 times a day does have a pad    HTN; lisinopril 20mg   Anxiety: on zoloft 25mg   HLD: on lipitor 20mg  And fish oil     Left toe pain off an on   But hard time walking now       The following portions of the patient's history were reviewed and updated as appropriate: allergies, current medications, past family history, past medical history, past social history, past surgical history and problem list     Review of Systems   Constitutional: Negative for fever and unexpected weight change  HENT: Negative for nosebleeds and trouble swallowing  Eyes: Negative for visual disturbance     Respiratory: Negative for chest tightness and shortness of breath  Cardiovascular: Negative for chest pain, palpitations and leg swelling  Gastrointestinal: Negative for abdominal pain, constipation, diarrhea and nausea  Endocrine: Negative for cold intolerance  Genitourinary: Negative for dysuria and urgency  Musculoskeletal: Positive for back pain  Negative for joint swelling and myalgias  Skin: Negative for rash  Neurological: Positive for dizziness  Negative for tremors, seizures and syncope  Hematological: Does not bruise/bleed easily  Psychiatric/Behavioral: Negative for hallucinations and suicidal ideas  Objective:      BP (!) 176/80 (BP Location: Left arm, Patient Position: Sitting, Cuff Size: Standard)   Pulse 93   Temp 98 6 °F (37 °C) (Tympanic)   Resp 16   Ht 5' 2" (1 575 m)   Wt 76 2 kg (168 lb)   SpO2 97%   BMI 30 73 kg/m²          Physical Exam  Vitals signs and nursing note reviewed  Constitutional:       Appearance: She is well-developed  HENT:      Head: Normocephalic and atraumatic  Neck:      Musculoskeletal: Normal range of motion and neck supple  Cardiovascular:      Rate and Rhythm: Normal rate and regular rhythm  Heart sounds: Normal heart sounds  Pulmonary:      Effort: Pulmonary effort is normal       Breath sounds: Normal breath sounds  Abdominal:      General: Bowel sounds are normal       Palpations: Abdomen is soft  Musculoskeletal: Normal range of motion  General: Tenderness present  Skin:     General: Skin is warm and dry  Capillary Refill: Capillary refill takes less than 2 seconds  Neurological:      Mental Status: She is alert and oriented to person, place, and time  Psychiatric:         Behavior: Behavior normal          Thought Content:  Thought content normal          Judgment: Judgment normal

## 2020-09-01 ENCOUNTER — TELEPHONE (OUTPATIENT)
Dept: FAMILY MEDICINE CLINIC | Facility: CLINIC | Age: 85
End: 2020-09-01

## 2020-09-01 NOTE — TELEPHONE ENCOUNTER
Pt called and said the Amlodipine is making her more dizzy than what she has been   Please advise on next steps

## 2020-09-02 NOTE — TELEPHONE ENCOUNTER
Patient advised to stop Amlodipine and give it 2-3 days and take the valium  Will call if she has any issues

## 2020-09-04 ENCOUNTER — TELEPHONE (OUTPATIENT)
Dept: FAMILY MEDICINE CLINIC | Facility: CLINIC | Age: 85
End: 2020-09-04

## 2020-09-04 NOTE — TELEPHONE ENCOUNTER
This is just an FYI, that Wilbert Comment stopped the Amlodipine and Is going to start the Valium   FYI

## 2020-09-10 DIAGNOSIS — I10 ESSENTIAL HYPERTENSION: Primary | ICD-10-CM

## 2020-09-10 RX ORDER — LISINOPRIL 20 MG/1
20 TABLET ORAL DAILY
Qty: 90 TABLET | Refills: 1 | Status: SHIPPED | OUTPATIENT
Start: 2020-09-10 | End: 2021-03-03

## 2020-12-12 DIAGNOSIS — F32.A DEPRESSION, UNSPECIFIED DEPRESSION TYPE: ICD-10-CM

## 2020-12-13 RX ORDER — SERTRALINE HYDROCHLORIDE 25 MG/1
TABLET, FILM COATED ORAL
Qty: 90 TABLET | Refills: 1 | Status: SHIPPED | OUTPATIENT
Start: 2020-12-13 | End: 2021-05-27

## 2020-12-18 ENCOUNTER — OFFICE VISIT (OUTPATIENT)
Dept: URGENT CARE | Facility: CLINIC | Age: 85
End: 2020-12-18
Payer: COMMERCIAL

## 2020-12-18 VITALS
SYSTOLIC BLOOD PRESSURE: 171 MMHG | WEIGHT: 168 LBS | DIASTOLIC BLOOD PRESSURE: 73 MMHG | BODY MASS INDEX: 30.73 KG/M2 | OXYGEN SATURATION: 95 % | TEMPERATURE: 98 F | HEART RATE: 89 BPM | RESPIRATION RATE: 16 BRPM

## 2020-12-18 DIAGNOSIS — K62.5 RECTAL BLEED: Primary | ICD-10-CM

## 2020-12-18 PROCEDURE — 99213 OFFICE O/P EST LOW 20 MIN: CPT | Performed by: NURSE PRACTITIONER

## 2020-12-18 PROCEDURE — S9083 URGENT CARE CENTER GLOBAL: HCPCS | Performed by: NURSE PRACTITIONER

## 2021-01-04 DIAGNOSIS — E78.5 HYPERLIPIDEMIA, UNSPECIFIED HYPERLIPIDEMIA TYPE: ICD-10-CM

## 2021-01-04 RX ORDER — ATORVASTATIN CALCIUM 20 MG/1
20 TABLET, FILM COATED ORAL DAILY
Qty: 90 TABLET | Refills: 1 | Status: SHIPPED | OUTPATIENT
Start: 2021-01-04 | End: 2021-06-29

## 2021-01-18 ENCOUNTER — IMMUNIZATIONS (OUTPATIENT)
Dept: FAMILY MEDICINE CLINIC | Facility: HOSPITAL | Age: 86
End: 2021-01-18

## 2021-01-18 DIAGNOSIS — Z23 ENCOUNTER FOR IMMUNIZATION: Primary | ICD-10-CM

## 2021-01-18 PROCEDURE — 0001A SARS-COV-2 / COVID-19 MRNA VACCINE (PFIZER-BIONTECH) 30 MCG: CPT

## 2021-01-18 PROCEDURE — 91300 SARS-COV-2 / COVID-19 MRNA VACCINE (PFIZER-BIONTECH) 30 MCG: CPT

## 2021-01-29 ENCOUNTER — OFFICE VISIT (OUTPATIENT)
Dept: URGENT CARE | Facility: CLINIC | Age: 86
End: 2021-01-29
Payer: COMMERCIAL

## 2021-01-29 VITALS
OXYGEN SATURATION: 97 % | WEIGHT: 167 LBS | DIASTOLIC BLOOD PRESSURE: 71 MMHG | SYSTOLIC BLOOD PRESSURE: 177 MMHG | TEMPERATURE: 97.9 F | RESPIRATION RATE: 16 BRPM | HEART RATE: 73 BPM | BODY MASS INDEX: 32.79 KG/M2 | HEIGHT: 60 IN

## 2021-01-29 DIAGNOSIS — M79.672 LEFT FOOT PAIN: Primary | ICD-10-CM

## 2021-01-29 PROCEDURE — S9083 URGENT CARE CENTER GLOBAL: HCPCS | Performed by: NURSE PRACTITIONER

## 2021-01-29 PROCEDURE — 99213 OFFICE O/P EST LOW 20 MIN: CPT | Performed by: NURSE PRACTITIONER

## 2021-01-29 NOTE — PATIENT INSTRUCTIONS
Rest, ice and elevate foot   Tylenol every 4-6 hours as needed for pain  If pain or swelling persist follow up with a podiatrist      Metatarsalgia   AMBULATORY CARE:   Metatarsalgia  is pain in the ball of your foot, near your second, third, and fourth toes  Common signs and symptoms of metatarsalgia:  Symptoms usually develop over time, but you may have sudden pain from an injury  You may have any of the following:  · Pain at the ball of your foot or near your toes that gets worse when you walk or stand, especially on hard surfaces    · Pain during exercises such as running    · Sharp or shooting pain in your toes that may get worse when you flex your toes    · Tingling or numbness in your toes    · Feeling like you are walking over rocks, or that you have a bruise    · A change in the way you walk because you try to avoid putting pressure on the ball of your foot    Contact your healthcare provider if:   · You develop knee, back, or hip pain  · You have more pain or redness in the foot  · You have questions or concerns about your condition or care  Treatment:  The cause of your metatarsalgia will be treated, if possible  You may also need any of the following:  · NSAIDs , such as ibuprofen, help decrease swelling, pain, and fever  This medicine is available with or without a doctor's order  NSAIDs can cause stomach bleeding or kidney problems in certain people  If you take blood thinner medicine, always ask if NSAIDs are safe for you  Always read the medicine label and follow directions  Do not give these medicines to children under 10months of age without direction from your child's healthcare provider  · Ultrasound  may be used to relieve your pain  Sound waves from the ultrasound can help send heat deeper into your tissues  · A steroid injection  may help decrease inflammation  · Surgery  may be needed if other treatments do not work  Surgery is used to align the bones near your toes  You may also need surgery to fix a problem such as hammertoe  Manage or prevent metatarsalgia:   · Rest your foot  If you play sports, you may not be able to do weight-bearing exercises  Examples include swimming and bike riding  Ask your healthcare provider which exercises are safe for you  · Apply ice as directed  Ice helps reduce pain and swelling  Use an ice pack, or put crushed ice in a plastic bag  Cover the pack or bag with a towel before you apply it to your foot  Apply ice for 15 to 20 minutes every hour, or as directed  · Use a cane or crutch if directed  These devices may help take pressure off your foot while it heals  · Wear proper shoes  Do not wear shoes that are narrow or tight  You may need to wear shoes that are wider than you usually wear  Choose shoes that do not have a raised heel  Shock-absorbing shoes can help prevent injury  These shoes will have extra support under your feet and toes  You can also add shoe cushions inside your shoes or to the bottoms of your feet, near your toes  The cushions may provide more support and make walking or standing more comfortable  Arch supports may help take pressure off your toes  · Reach or maintain a healthy weight  Extra weight can put pressure on your feet  Talk to your healthcare provider about a healthy weight for you  Your provider can help you create a safe weight loss plan if you are overweight  · Go to physical therapy if directed  A physical therapist can help improve your strength and range of motion  The therapist can also help you improve the way you walk to prevent metatarsalgia from happening again  Your therapist can also teach you exercises to help relieve your pain  Follow up with your healthcare provider as directed:  Write down your questions so you remember to ask them during your visits     © Copyright [employer] [address] Information is for End User's use only and may not be sold, redistributed or otherwise used for commercial purposes  All illustrations and images included in CareNotes® are the copyrighted property of A D A M , Inc  or Dequan Tineo  The above information is an  only  It is not intended as medical advice for individual conditions or treatments  Talk to your doctor, nurse or pharmacist before following any medical regimen to see if it is safe and effective for you

## 2021-01-29 NOTE — PROGRESS NOTES
[facility]        NAME: Eleno Doll is a 82 y o  female  : 1935    MRN: [id number]  DATE: 2021  TIME: 4:25 PM    Assessment and Plan   Left foot pain [M79 [id number]]  1  Left foot pain  XR foot 3+ vw left         Patient Instructions   Rest, ice and elevate foot   Tylenol every 4-6 hours as needed for pain  If pain or swelling persist follow up with a podiatrist        Follow up with PCP in 3-5 days  Proceed to  ER if symptoms worsen  Chief Complaint     Chief Complaint   Patient presents with    Foot Pain     Lt foot pain started x 7 days ago  No injury that patient recalls  Bump on outer surface  No bruising  Achey pain  States no pain when ambulating  History of Present Illness       Patient is an 82year old female presenting with left lateral foot pain for the past 7 days  She is unsure of injury  Denies pain with ambulation  Denies N/T/W  She took ibuprofen yesterday with improvement  Review of Systems   Review of Systems   Constitutional: Negative for activity change, chills and fever  Gastrointestinal: Negative for diarrhea, nausea and vomiting  Musculoskeletal: Negative for arthralgias and joint swelling  Skin: Negative for color change, rash and wound  Neurological: Negative for weakness and numbness           Current Medications       Current Outpatient Medications:     amLODIPine (NORVASC) 2 5 mg tablet, Take 1 tablet (2 5 mg total) by mouth daily, Disp: 30 tablet, Rfl: 5    aspirin (ASPIRIN 81) 81 mg chewable tablet, Chew 81 mg, Disp: , Rfl:     atorvastatin (LIPITOR) 20 mg tablet, Take 1 tablet (20 mg total) by mouth daily, Disp: 90 tablet, Rfl: 1    b complex-vitamin C-folic acid (NEPHROCAPS) 1 mg capsule, Take 1 capsule by mouth, Disp: , Rfl:     Calcium 600-200 MG-UNIT per tablet, Take by mouth, Disp: , Rfl:     lisinopril (ZESTRIL) 20 mg tablet, Take 1 tablet (20 mg total) by mouth daily, Disp: 90 tablet, Rfl: 1    Omega-3 Fatty Acids (FISH OIL) 1200 MG CAPS, Take 1,200 mg by mouth, Disp: , Rfl:     sertraline (ZOLOFT) 25 mg tablet, TAKE 1 TABLET BY MOUTH EVERY DAY IN THE MORNING, Disp: 90 tablet, Rfl: 1    diazepam (VALIUM) 5 mg tablet, Take 1 tablet (5 mg total) by mouth daily (Patient not taking: Reported on 12/18/2020), Disp: 30 tablet, Rfl: 1    lidocaine (LIDODERM) 5 %, Apply 1 patch topically daily Remove & Discard patch within 12 hours or as directed by DO (Patient not taking: Reported on 12/18/2020), Disp: 15 patch, Rfl: 1    Current Allergies     Allergies as of 01/29/2021 - Reviewed 01/29/2021   Allergen Reaction Noted    Amoxicillin Diarrhea 05/19/2020            The following portions of the patient's history were reviewed and updated as appropriate: allergies, current medications, past family history, past medical history, past social history, past surgical history and problem list      Past Medical History:   Diagnosis Date    Aortic valve stenosis     Depression     H/O echocardiogram 08/2019    High cholesterol     Hyperlipidemia     Hypertension     Hypothyroidism     Insomnia     Mitral valve regurgitation        Past Surgical History:   Procedure Laterality Date    CARDIAC ELECTROPHYSIOLOGY PROCEDURE  09/2019    CATARACT EXTRACTION, BILATERAL  2016    COLONOSCOPY  2019    fecal implant    EYE SURGERY      KNEE CARTILAGE SURGERY      KNEE SURGERY Left     menuscus torn/repaired    MAMMO (HISTORICAL)  2017    [facility]       Family History   Problem Relation Age of Onset    Sudden death Mother         cardiac    Hypertension Mother     Stroke Father     Hypertension Sister     Breast cancer Sister          Medications have been verified  Objective   BP (!) 177/71   Pulse 73   Temp 97 9 °F (36 6 °C) (Probe)   Resp 16   Ht 5' (1 524 m)   Wt 75 8 kg (167 lb)   SpO2 97%   BMI 32 61 kg/m²      Left foot xray: Negative for fracture  Physical Exam     Physical Exam  Vitals signs reviewed  Constitutional:       General: She is awake  She is not in acute distress  Appearance: Normal appearance  She is normal weight  Cardiovascular:      Rate and Rhythm: Normal rate and regular rhythm  Pulses:           Dorsalis pedis pulses are 2+ on the left side  Posterior tibial pulses are 2+ on the left side  Pulmonary:      Effort: Pulmonary effort is normal       Breath sounds: Normal breath sounds  No decreased breath sounds, wheezing, rhonchi or rales  Musculoskeletal:        Feet:    Skin:     General: Skin is warm and moist    Neurological:      General: No focal deficit present  Mental Status: She is alert, oriented to person, place, and time and easily aroused  Psychiatric:         Behavior: Behavior is cooperative

## 2021-02-08 ENCOUNTER — IMMUNIZATIONS (OUTPATIENT)
Dept: FAMILY MEDICINE CLINIC | Facility: HOSPITAL | Age: 86
End: 2021-02-08

## 2021-02-08 DIAGNOSIS — Z23 ENCOUNTER FOR IMMUNIZATION: Primary | ICD-10-CM

## 2021-02-08 PROCEDURE — 91300 SARS-COV-2 / COVID-19 MRNA VACCINE (PFIZER-BIONTECH) 30 MCG: CPT

## 2021-02-08 PROCEDURE — 0002A SARS-COV-2 / COVID-19 MRNA VACCINE (PFIZER-BIONTECH) 30 MCG: CPT

## 2021-03-03 DIAGNOSIS — I10 ESSENTIAL HYPERTENSION: ICD-10-CM

## 2021-03-03 RX ORDER — LISINOPRIL 20 MG/1
TABLET ORAL
Qty: 90 TABLET | Refills: 1 | Status: SHIPPED | OUTPATIENT
Start: 2021-03-03

## 2021-03-04 ENCOUNTER — OFFICE VISIT (OUTPATIENT)
Dept: FAMILY MEDICINE CLINIC | Facility: CLINIC | Age: 86
End: 2021-03-04
Payer: COMMERCIAL

## 2021-03-04 ENCOUNTER — LAB (OUTPATIENT)
Dept: LAB | Facility: CLINIC | Age: 86
End: 2021-03-04
Payer: COMMERCIAL

## 2021-03-04 ENCOUNTER — TELEPHONE (OUTPATIENT)
Dept: NEUROLOGY | Facility: CLINIC | Age: 86
End: 2021-03-04

## 2021-03-04 ENCOUNTER — TRANSCRIBE ORDERS (OUTPATIENT)
Dept: LAB | Facility: CLINIC | Age: 86
End: 2021-03-04

## 2021-03-04 VITALS
SYSTOLIC BLOOD PRESSURE: 138 MMHG | HEART RATE: 75 BPM | RESPIRATION RATE: 16 BRPM | HEIGHT: 60 IN | OXYGEN SATURATION: 97 % | DIASTOLIC BLOOD PRESSURE: 76 MMHG | WEIGHT: 166 LBS | BODY MASS INDEX: 32.59 KG/M2

## 2021-03-04 DIAGNOSIS — I77.9 CAROTID ARTERY DISEASE WITHOUT CEREBRAL INFARCTION (HCC): ICD-10-CM

## 2021-03-04 DIAGNOSIS — Z79.899 OTHER LONG TERM (CURRENT) DRUG THERAPY: ICD-10-CM

## 2021-03-04 DIAGNOSIS — E53.8 B12 DEFICIENCY: ICD-10-CM

## 2021-03-04 DIAGNOSIS — R42 VERTIGO: ICD-10-CM

## 2021-03-04 DIAGNOSIS — R32 URINARY INCONTINENCE, UNSPECIFIED TYPE: ICD-10-CM

## 2021-03-04 DIAGNOSIS — R32 URINARY INCONTINENCE, UNSPECIFIED TYPE: Primary | ICD-10-CM

## 2021-03-04 DIAGNOSIS — E61.1 IRON DEFICIENCY: Primary | ICD-10-CM

## 2021-03-04 DIAGNOSIS — E55.9 VITAMIN D DEFICIENCY: ICD-10-CM

## 2021-03-04 DIAGNOSIS — Z00.00 WELL ADULT EXAM: ICD-10-CM

## 2021-03-04 DIAGNOSIS — Z74.09 IMPAIRED FUNCTIONAL MOBILITY, BALANCE, AND ENDURANCE: ICD-10-CM

## 2021-03-04 DIAGNOSIS — E61.1 IRON DEFICIENCY: ICD-10-CM

## 2021-03-04 DIAGNOSIS — R41.3 MEMORY IMPAIRMENT: ICD-10-CM

## 2021-03-04 LAB
25(OH)D3 SERPL-MCNC: 33.6 NG/ML (ref 30–100)
ALBUMIN SERPL BCP-MCNC: 3.9 G/DL (ref 3.5–5)
ALP SERPL-CCNC: 69 U/L (ref 46–116)
ALT SERPL W P-5'-P-CCNC: 29 U/L (ref 12–78)
ANION GAP SERPL CALCULATED.3IONS-SCNC: 2 MMOL/L (ref 4–13)
AST SERPL W P-5'-P-CCNC: 20 U/L (ref 5–45)
BACTERIA UR QL AUTO: ABNORMAL /HPF
BASOPHILS # BLD AUTO: 0.05 THOUSANDS/ΜL (ref 0–0.1)
BASOPHILS NFR BLD AUTO: 1 % (ref 0–1)
BILIRUB SERPL-MCNC: 0.66 MG/DL (ref 0.2–1)
BILIRUB UR QL STRIP: ABNORMAL
BUN SERPL-MCNC: 14 MG/DL (ref 5–25)
CALCIUM SERPL-MCNC: 9.2 MG/DL (ref 8.3–10.1)
CHLORIDE SERPL-SCNC: 106 MMOL/L (ref 100–108)
CHOLEST SERPL-MCNC: 175 MG/DL (ref 50–200)
CLARITY UR: ABNORMAL
CO2 SERPL-SCNC: 31 MMOL/L (ref 21–32)
COLOR UR: ABNORMAL
CREAT SERPL-MCNC: 1.21 MG/DL (ref 0.6–1.3)
EOSINOPHIL # BLD AUTO: 0.11 THOUSAND/ΜL (ref 0–0.61)
EOSINOPHIL NFR BLD AUTO: 2 % (ref 0–6)
ERYTHROCYTE [DISTWIDTH] IN BLOOD BY AUTOMATED COUNT: 12.5 % (ref 11.6–15.1)
EST. AVERAGE GLUCOSE BLD GHB EST-MCNC: 105 MG/DL
FERRITIN SERPL-MCNC: 43 NG/ML (ref 8–388)
GFR SERPL CREATININE-BSD FRML MDRD: 41 ML/MIN/1.73SQ M
GLUCOSE P FAST SERPL-MCNC: 80 MG/DL (ref 65–99)
GLUCOSE UR STRIP-MCNC: NEGATIVE MG/DL
HBA1C MFR BLD: 5.3 %
HCT VFR BLD AUTO: 45.1 % (ref 34.8–46.1)
HDLC SERPL-MCNC: 86 MG/DL
HGB BLD-MCNC: 14.3 G/DL (ref 11.5–15.4)
HGB UR QL STRIP.AUTO: NEGATIVE
IMM GRANULOCYTES # BLD AUTO: 0.01 THOUSAND/UL (ref 0–0.2)
IMM GRANULOCYTES NFR BLD AUTO: 0 % (ref 0–2)
IRON SATN MFR SERPL: 19 %
IRON SERPL-MCNC: 66 UG/DL (ref 50–170)
KETONES UR STRIP-MCNC: ABNORMAL MG/DL
LDLC SERPL CALC-MCNC: 75 MG/DL (ref 0–100)
LEUKOCYTE ESTERASE UR QL STRIP: ABNORMAL
LYMPHOCYTES # BLD AUTO: 1.98 THOUSANDS/ΜL (ref 0.6–4.47)
LYMPHOCYTES NFR BLD AUTO: 29 % (ref 14–44)
MCH RBC QN AUTO: 28.8 PG (ref 26.8–34.3)
MCHC RBC AUTO-ENTMCNC: 31.7 G/DL (ref 31.4–37.4)
MCV RBC AUTO: 91 FL (ref 82–98)
MONOCYTES # BLD AUTO: 0.57 THOUSAND/ΜL (ref 0.17–1.22)
MONOCYTES NFR BLD AUTO: 8 % (ref 4–12)
MUCOUS THREADS UR QL AUTO: ABNORMAL
NEUTROPHILS # BLD AUTO: 4.23 THOUSANDS/ΜL (ref 1.85–7.62)
NEUTS SEG NFR BLD AUTO: 60 % (ref 43–75)
NITRITE UR QL STRIP: NEGATIVE
NON-SQ EPI CELLS URNS QL MICRO: ABNORMAL /HPF
NONHDLC SERPL-MCNC: 89 MG/DL
NRBC BLD AUTO-RTO: 0 /100 WBCS
PH UR STRIP.AUTO: 5.5 [PH]
PLATELET # BLD AUTO: 189 THOUSANDS/UL (ref 149–390)
PMV BLD AUTO: 11.2 FL (ref 8.9–12.7)
POTASSIUM SERPL-SCNC: 3.9 MMOL/L (ref 3.5–5.3)
PROT SERPL-MCNC: 7.4 G/DL (ref 6.4–8.2)
PROT UR STRIP-MCNC: ABNORMAL MG/DL
RBC # BLD AUTO: 4.96 MILLION/UL (ref 3.81–5.12)
RBC #/AREA URNS AUTO: ABNORMAL /HPF
SODIUM SERPL-SCNC: 139 MMOL/L (ref 136–145)
SP GR UR STRIP.AUTO: 1.03 (ref 1–1.03)
TIBC SERPL-MCNC: 342 UG/DL (ref 250–450)
TRIGL SERPL-MCNC: 72 MG/DL
TSH SERPL DL<=0.05 MIU/L-ACNC: 3.47 UIU/ML (ref 0.36–3.74)
UROBILINOGEN UR QL STRIP.AUTO: 1 E.U./DL
VIT B12 SERPL-MCNC: 550 PG/ML (ref 100–900)
WBC # BLD AUTO: 6.95 THOUSAND/UL (ref 4.31–10.16)
WBC #/AREA URNS AUTO: ABNORMAL /HPF

## 2021-03-04 PROCEDURE — 83550 IRON BINDING TEST: CPT

## 2021-03-04 PROCEDURE — 80061 LIPID PANEL: CPT

## 2021-03-04 PROCEDURE — 85025 COMPLETE CBC W/AUTO DIFF WBC: CPT

## 2021-03-04 PROCEDURE — 83540 ASSAY OF IRON: CPT

## 2021-03-04 PROCEDURE — 99214 OFFICE O/P EST MOD 30 MIN: CPT | Performed by: FAMILY MEDICINE

## 2021-03-04 PROCEDURE — 1170F FXNL STATUS ASSESSED: CPT | Performed by: FAMILY MEDICINE

## 2021-03-04 PROCEDURE — 81001 URINALYSIS AUTO W/SCOPE: CPT

## 2021-03-04 PROCEDURE — 82728 ASSAY OF FERRITIN: CPT

## 2021-03-04 PROCEDURE — 82607 VITAMIN B-12: CPT

## 2021-03-04 PROCEDURE — 84443 ASSAY THYROID STIM HORMONE: CPT

## 2021-03-04 PROCEDURE — 3725F SCREEN DEPRESSION PERFORMED: CPT | Performed by: FAMILY MEDICINE

## 2021-03-04 PROCEDURE — 1125F AMNT PAIN NOTED PAIN PRSNT: CPT | Performed by: FAMILY MEDICINE

## 2021-03-04 PROCEDURE — G0439 PPPS, SUBSEQ VISIT: HCPCS | Performed by: FAMILY MEDICINE

## 2021-03-04 PROCEDURE — 83036 HEMOGLOBIN GLYCOSYLATED A1C: CPT

## 2021-03-04 PROCEDURE — 80053 COMPREHEN METABOLIC PANEL: CPT

## 2021-03-04 PROCEDURE — 36415 COLL VENOUS BLD VENIPUNCTURE: CPT

## 2021-03-04 PROCEDURE — 82306 VITAMIN D 25 HYDROXY: CPT

## 2021-03-04 NOTE — PROGRESS NOTES
Assessment and Plan:     Problem List Items Addressed This Visit        Cardiovascular and Mediastinum    Carotid artery disease without cerebral infarction ([facility] [address] )    Relevant Orders    Lipid panel (Completed)    VAS carotid complete study      Other Visit Diagnoses     Urinary incontinence, unspecified type    -  Primary    Relevant Orders    UA w Reflex to Microscopic w Reflex to Culture (Completed)    MRI brain w wo contrast    Ambulatory referral to Neurology    Memory impairment        Relevant Orders    TSH, 3rd generation with Free T4 reflex (Completed)    MRI brain w wo contrast    Ambulatory referral to Neurology    Vertigo        Relevant Orders    MRI brain w wo contrast    Ambulatory referral to Neurology    Impaired functional mobility, balance, and endurance        Relevant Orders    MRI brain w wo contrast    Ambulatory referral to Neurology    Vitamin D deficiency        Relevant Orders    Vitamin D 25 hydroxy (Completed)    Iron deficiency        Relevant Orders    CBC and differential    B12 deficiency        Relevant Orders    Vitamin B12    Well adult exam        Relevant Orders    Lipid panel (Completed)    Comprehensive metabolic panel (Completed)    Other long term (current) drug therapy        Relevant Orders    Lipid panel (Completed)    Hemoglobin A1C        BMI Counseling: Body mass index is 32 42 kg/m²  The BMI is above normal  Nutrition recommendations include decreasing portion sizes, encouraging healthy choices of fruits and vegetables and limiting drinks that contain sugar  Exercise recommendations include moderate physical activity 150 minutes/week  No pharmacotherapy was ordered  Falls Plan of Care: balance, strength, and gait training instructions were provided  Medications that increase falls were reviewed  Preventive health issues were discussed with patient, and age appropriate screening tests were ordered as noted in patient's After Visit Summary    Personalized health advice and appropriate referrals for health education or preventive services given if needed, as noted in patient's After Visit Summary  History of Present Illness:     Patient presents for Medicare Annual Wellness visit    Patient Care Team:  Maria C Underwood MD as PCP - General     Problem List:     Patient Active Problem List   Diagnosis    Spasm of muscle of lower back    Chronic bilateral low back pain without sciatica    Carotid artery disease without cerebral infarction [facility])      Past Medical and Surgical History:     Past Medical History:   Diagnosis Date    Aortic valve stenosis     Depression     H/O echocardiogram 2019    High cholesterol     Hyperlipidemia     Hypertension     Hypothyroidism     Insomnia     Mitral valve regurgitation      Past Surgical History:   Procedure Laterality Date    CARDIAC ELECTROPHYSIOLOGY PROCEDURE  2019    CATARACT EXTRACTION, BILATERAL  2016    COLONOSCOPY      fecal implant    EYE SURGERY      KNEE CARTILAGE SURGERY      KNEE SURGERY Left     menuscus torn/repaired    MAMMO (HISTORICAL)      [facility]      Family History:     Family History   Problem Relation Age of Onset   Danilo Niño Sudden death Mother         cardiac    Hypertension Mother    Cathmassimoe Salon Stroke Father     Hypertension Sister     Breast cancer Sister       Social History:        Social History     Socioeconomic History    Marital status:       Spouse name: None    Number of children: None    Years of education: 6    Highest education level: None   Occupational History    Occupation: Retired   Social Needs    Financial resource strain: None    Food insecurity     Worry: None     Inability: None    Transportation needs     Medical: None     Non-medical: None   Tobacco Use    Smoking status: Former Smoker     Packs/day: 1 00     Years: 50 00     Pack years: 50 00     Quit date:      Years since quittin 1    Smokeless tobacco: Never Used   Substance and Sexual Activity    Alcohol use: Never     Frequency: Never    Drug use: Never    Sexual activity: None   Lifestyle    Physical activity     Days per week: None     Minutes per session: None    Stress: None   Relationships    Social connections     Talks on phone: None     Gets together: None     Attends Tenriism service: None     Active member of club or organization: None     Attends meetings of clubs or organizations: None     Relationship status: None    Intimate partner violence     Fear of current or ex partner: None     Emotionally abused: None     Physically abused: None     Forced sexual activity: None   Other Topics Concern    None   Social History Narrative    Most recent tobacco use screenin-    Do you currently or have you served in [employer]: No    Were you activated, into active duty, as a member of the [employer] or as a Reservist: No    Occupation: Retired    Education: 6    Marital status:     Exercise level: Occasional    Diet: Regular    General stress level: High    Has smoked since age: 8    Alcohol intake: Moderate    1 glass of wine daily    Caffeine intake:  Moderate    Chewing tobacco: none    Illicit drugs: no    Seat belts used routinely: Yes    Sunscreen used routinely: No    Smoke alarm in home: Yes    Advance directive: Yes    Salt Intake: minimal      Medications and Allergies:     Current Outpatient Medications   Medication Sig Dispense Refill    amLODIPine (NORVASC) 2 5 mg tablet Take 1 tablet (2 5 mg total) by mouth daily 30 tablet 5    aspirin (ASPIRIN 81) 81 mg chewable tablet Chew 81 mg      atorvastatin (LIPITOR) 20 mg tablet Take 1 tablet (20 mg total) by mouth daily 90 tablet 1    b complex-vitamin C-folic acid (NEPHROCAPS) 1 mg capsule Take 1 capsule by mouth      Calcium 600-200 MG-UNIT per tablet Take by mouth      lisinopril (ZESTRIL) 20 mg tablet TAKE 1 TABLET BY MOUTH EVERY DAY 90 tablet 1    Omega-3 Fatty Acids (FISH OIL) 1200 MG CAPS Take 1,200 mg by mouth      sertraline (ZOLOFT) 25 mg tablet TAKE 1 TABLET BY MOUTH EVERY DAY IN THE MORNING 90 tablet 1     No current facility-administered medications for this visit  Allergies   Allergen Reactions    Amoxicillin Diarrhea      Immunizations:     Immunization History   Administered Date(s) Administered    Influenza, high dose seasonal 0 7 mL 10/19/2020    SARS-CoV-2 / COVID-19 mRNA IM (Pfizer-BioNTech) 01/18/2021, 02/08/2021    Tdap 09/29/2014      Health Maintenance: There are no preventive care reminders to display for this patient  Topic Date Due    Pneumococcal Vaccine: 65+ Years (1 of 1 - PPSV23) 08/26/2002      Medicare Health Risk Assessment:     /76 (BP Location: Left arm, Patient Position: Sitting, Cuff Size: Standard)   Pulse 75   Resp 16   Ht 5' (1 524 m)   Wt 75 3 kg (166 lb)   SpO2 97%   BMI 32 42 kg/m²      Virgil Merrill is here for her Subsequent Wellness visit  Last Medicare Wellness visit information reviewed, patient interviewed and updates made to the record today  Health Risk Assessment:   Patient rates overall health as very good  Patient feels that their physical health rating is same  Eyesight was rated as same  Hearing was rated as same  Patient feels that their emotional and mental health rating is same  Pain experienced in the last 7 days has been some  Patient's pain rating has been 5/10  Patient states that she has experienced no weight loss or gain in last 6 months  Depression Screening:   PHQ-2 Score: 0  PHQ-9 Score: 0      Fall Risk Screening: In the past year, patient has experienced: no history of falling in past year      Urinary Incontinence Screening:   Patient has leaked urine accidently in the last six months  Home Safety:  Patient has trouble with stairs inside or outside of their home  Patient has working smoke alarms and has no working carbon monoxide detector  Home safety hazards include: none  Nutrition:   Current diet is Regular  Medications:   Patient is currently taking over-the-counter supplements  OTC medications include: see medication list  Patient is able to manage medications  Activities of Daily Living (ADLs)/Instrumental Activities of Daily Living (IADLs):   Walk and transfer into and out of bed and chair?: Yes  Dress and groom yourself?: Yes    Bathe or shower yourself?: Yes    Feed yourself? Yes  Do your laundry/housekeeping?: Yes  Manage your money, pay your bills and track your expenses?: Yes  Make your own meals?: Yes    Do your own shopping?: Yes    Previous Hospitalizations:   Any hospitalizations or ED visits within the last 12 months?: Yes    How many hospitalizations have you had in the last year?: 1-2    Advance Care Planning:   Living will: Yes    Durable POA for healthcare:  Yes    Advanced directive: Yes    Five wishes given: No      Cognitive Screening:   Provider or family/friend/caregiver concerned regarding cognition?: Yes    PREVENTIVE SCREENINGS      Cardiovascular Screening:    General: Screening Not Indicated and History Lipid Disorder      Diabetes Screening:     General: Screening Current      Colorectal Cancer Screening:     General: Screening Not Indicated      Breast Cancer Screening:     General: Screening Not Indicated      Cervical Cancer Screening:    General: Screening Not Indicated      Osteoporosis Screening:    General: Risks and Benefits Discussed      Abdominal Aortic Aneurysm (AAA) Screening:        General: Screening Not Indicated      Lung Cancer Screening:     General: Screening Not Indicated      Hepatitis C Screening:    General: Screening Current      Preventive Screening Comments: Does not do joe Acevedo MD

## 2021-03-04 NOTE — TELEPHONE ENCOUNTER
Best contact number for MUNIRA:[phone]    Emergency Contact name and number:None    Referring provider and telephone number:Hollis PEDRO [place] [facility]  -  [facility] Provider [facility] and [facility]: Sebastian Black    Reason for Appointment/Dx:Memory / Vertigo/Unrinary Incontinence/Balance    Have you seen and followed up with a pediatric Neurologist for this disease in the past?  No    Neurology Location patient would like to be seen:[facility]    Order received? Yes                                                  Records Received? No    Have you ever seen another Neurologist?       No    Insurance [address]     ID/Policy #:    Secondary Insurance:    ID/Policy#: Workman's Comp/ Accident/ School  Information      Workman's Comp/Accident/School related?        None    If yes name of Insurance company:    Date of Injury:    Type of Injury:    [address] Name and Telephone Number:    Notes:Appointment schedule with patient new patient pack sent                    Appointment date: 07-27-21 2:00pm with Dr John Mcgrath

## 2021-03-04 NOTE — PATIENT INSTRUCTIONS
Medicare Preventive Visit Patient Instructions  Thank you for completing your Welcome to Medicare Visit or Medicare Annual Wellness Visit today  Your next wellness visit will be due in one year (3/4/2022)  The screening/preventive services that you may require over the next 5-10 years are detailed below  Some tests may not apply to you based off risk factors and/or age  Screening tests ordered at today's visit but not completed yet may show as past due  Also, please note that scanned in results may not display below  Preventive Screenings:  Service Recommendations Previous Testing/Comments   Colorectal Cancer Screening  * Colonoscopy    * Fecal Occult Blood Test (FOBT)/Fecal Immunochemical Test (FIT)  * Fecal DNA/Cologuard Test  * Flexible Sigmoidoscopy Age: 54-65 years old   Colonoscopy: every 10 years (may be performed more frequently if at higher risk)  OR  FOBT/FIT: every 1 year  OR  Cologuard: every 3 years  OR  Sigmoidoscopy: every 5 years  Screening may be recommended earlier than age 48 if at higher risk for colorectal cancer  Also, an individualized decision between you and your healthcare provider will decide whether screening between the ages of 74-80 would be appropriate  Colonoscopy: Not on file  FOBT/FIT: Not on file  Cologuard: Not on file  Sigmoidoscopy: Not on file         Breast Cancer Screening Age: 36 years old  Frequency: every 1-2 years  Not required if history of left and right mastectomy Mammogram: Not on file       Cervical Cancer Screening Between the ages of 21-29, pap smear recommended once every 3 years  Between the ages of 33-67, can perform pap smear with HPV co-testing every 5 years     Recommendations may differ for women with a history of total hysterectomy, cervical cancer, or abnormal pap smears in past  Pap Smear: Not on file    Screening Not Indicated   Hepatitis C Screening Once for adults born between [facility]  More frequently in patients at high risk for Hepatitis C Hep C Antibody: Not on file       Diabetes Screening 1-2 times per year if you're at risk for diabetes or have pre-diabetes Fasting glucose: No results in last 5 years   A1C: No results in last 5 years       Cholesterol Screening Once every 5 years if you don't have a lipid disorder  May order more often based on risk factors  Lipid panel: Not on file    Screening Not Indicated  History Lipid Disorder     Other Preventive Screenings Covered by Medicare:  1  Abdominal Aortic Aneurysm (AAA) Screening: covered once if your at risk  You're considered to be at risk if you have a family history of AAA  2  Lung Cancer Screening: covers low dose CT scan once per year if you meet all of the following conditions: (1) Age 50-69; (2) No signs or symptoms of lung cancer; (3) Current smoker or have quit smoking within the last 15 years; (4) You have a tobacco smoking history of at least 30 pack years (packs per day multiplied by number of years you smoked); (5) You get a written order from a healthcare provider  3  Glaucoma Screening: covered annually if you're considered high risk: (1) You have diabetes OR (2) Family history of glaucoma OR (3)  aged 48 and older OR (3)  American aged 72 and older  3  Osteoporosis Screening: covered every 2 years if you meet one of the following conditions: (1) You're estrogen deficient and at risk for osteoporosis based off medical history and other findings; (2) Have a vertebral abnormality; (3) On glucocorticoid therapy for more than 3 months; (4) Have primary hyperparathyroidism; (5) On osteoporosis medications and need to assess response to drug therapy  · Last bone density test (DXA Scan): Not on file  5  HIV Screening: covered annually if you're between the age of 12-76  Also covered annually if you are younger than 13 and older than 72 with risk factors for HIV infection   For pregnant patients, it is covered up to 3 times per pregnancy  Immunizations:  Immunization Recommendations   Influenza Vaccine Annual influenza vaccination during flu season is recommended for all persons aged >= 6 months who do not have contraindications   Pneumococcal Vaccine (Prevnar and Pneumovax)  * Prevnar = PCV13  * Pneumovax = PPSV23   Adults 25-60 years old: 1-3 doses may be recommended based on certain risk factors  Adults 72 years old: Prevnar (PCV13) vaccine recommended followed by Pneumovax (PPSV23) vaccine  If already received PPSV23 since turning 65, then PCV13 recommended at least one year after PPSV23 dose  Hepatitis B Vaccine 3 dose series if at intermediate or high risk (ex: diabetes, end stage renal disease, liver disease)   Tetanus (Td) Vaccine - COST NOT COVERED BY MEDICARE PART B Following completion of primary series, a booster dose should be given every 10 years to maintain immunity against tetanus  Td may also be given as tetanus wound prophylaxis  Tdap Vaccine - COST NOT COVERED BY MEDICARE PART B Recommended at least once for all adults  For pregnant patients, recommended with each pregnancy  Shingles Vaccine (Shingrix) - COST NOT COVERED BY MEDICARE PART B  2 shot series recommended in those aged 48 and above     Health Maintenance Due:  There are no preventive care reminders to display for this patient  Immunizations Due:      Topic Date Due    Pneumococcal Vaccine: 65+ Years (1 of 1 - PPSV23) 08/26/2002     Advance Directives   What are advance directives? Advance directives are legal documents that state your wishes and plans for medical care  These plans are made ahead of time in case you lose your ability to make decisions for yourself  Advance directives can apply to any medical decision, such as the treatments you want, and if you want to donate organs  What are the types of advance directives? There are many types of advance directives, and each state has rules about how to use them   You may choose a combination of any of the following:  · Living will: This is a written record of the treatment you want  You can also choose which treatments you do not want, which to limit, and which to stop at a certain time  This includes surgery, medicine, IV fluid, and tube feedings  · Durable power of  for healthcare [facility]): This is a written record that states who you want to make healthcare choices for you when you are unable to make them for yourself  This person, called a proxy, is usually a family member or a friend  You may choose more than 1 proxy  · Do not resuscitate (DNR) order:  A DNR order is used in case your heart stops beating or you stop breathing  It is a request not to have certain forms of treatment, such as CPR  A DNR order may be included in other types of advance directives  · Medical directive: This covers the care that you want if you are in a coma, near death, or unable to make decisions for yourself  You can list the treatments you want for each condition  Treatment may include pain medicine, surgery, blood transfusions, dialysis, IV or tube feedings, and a ventilator (breathing machine)  · Values history: This document has questions about your views, beliefs, and how you feel and think about life  This information can help others choose the care that you would choose  Why are advance directives important? An advance directive helps you control your care  Although spoken wishes may be used, it is better to have your wishes written down  Spoken wishes can be misunderstood, or not followed  Treatments may be given even if you do not want them  An advance directive may make it easier for your family to make difficult choices about your care  Urinary Incontinence   Urinary incontinence (UI)  is when you lose control of your bladder  UI develops because your bladder cannot store or empty urine properly   The 3 most common types of UI are stress incontinence, urge incontinence, or both   Medicines:   · May be given to help strengthen your bladder control  Report any side effects of medication to your healthcare provider  Do pelvic muscle exercises often:  Your pelvic muscles help you stop urinating  Squeeze these muscles tight for 5 seconds, then relax for 5 seconds  Gradually work up to squeezing for 10 seconds  Do 3 sets of 15 repetitions a day, or as directed  This will help strengthen your pelvic muscles and improve bladder control  Train your bladder:  Go to the bathroom at set times, such as every 2 hours, even if you do not feel the urge to go  You can also try to hold your urine when you feel the urge to go  For example, hold your urine for 5 minutes when you feel the urge to go  As that becomes easier, hold your urine for 10 minutes  Self-care:   · Keep a UI record  Write down how often you leak urine and how much you leak  Make a note of what you were doing when you leaked urine  · Drink liquids as directed  You may need to limit the amount of liquid you drink to help control your urine leakage  Do not drink any liquid right before you go to bed  Limit or do not have drinks that contain caffeine or alcohol  · Prevent constipation  Eat a variety of high-fiber foods  Good examples are high-fiber cereals, beans, vegetables, and whole-grain breads  Walking is the best way to trigger your intestines to have a bowel movement  · Exercise regularly and maintain a healthy weight  Weight loss and exercise will decrease pressure on your bladder and help you control your leakage  · Use a catheter as directed  to help empty your bladder  A catheter is a tiny, plastic tube that is put into your bladder to drain your urine  · Go to behavior therapy as directed  Behavior therapy may be used to help you learn to control your urge to urinate      Weight Management   Why it is important to manage your weight:  Being overweight increases your risk of health conditions such as heart disease, high blood pressure, type 2 diabetes, and certain types of cancer  It can also increase your risk for osteoarthritis, sleep apnea, and other respiratory problems  Aim for a slow, steady weight loss  Even a small amount of weight loss can lower your risk of health problems  How to lose weight safely:  A safe and healthy way to lose weight is to eat fewer calories and get regular exercise  You can lose up about 1 pound a week by decreasing the number of calories you eat by 500 calories each day  Healthy meal plan for weight management:  A healthy meal plan includes a variety of foods, contains fewer calories, and helps you stay healthy  A healthy meal plan includes the following:  · Eat whole-grain foods more often  A healthy meal plan should contain fiber  Fiber is the part of grains, fruits, and vegetables that is not broken down by your body  Whole-grain foods are healthy and provide extra fiber in your diet  Some examples of whole-grain foods are whole-wheat breads and pastas, oatmeal, brown rice, and bulgur  · Eat a variety of vegetables every day  Include dark, leafy greens such as spinach, kale, jamee greens, and mustard greens  Eat yellow and orange vegetables such as carrots, sweet potatoes, and winter squash  · Eat a variety of fruits every day  Choose fresh or canned fruit (canned in its own juice or light syrup) instead of juice  Fruit juice has very little or no fiber  · Eat low-fat dairy foods  Drink fat-free (skim) milk or 1% milk  Eat fat-free yogurt and low-fat cottage cheese  Try low-fat cheeses such as mozzarella and other reduced-fat cheeses  · Choose meat and other protein foods that are low in fat  Choose beans or other legumes such as split peas or lentils  Choose fish, skinless poultry (chicken or turkey), or lean cuts of red meat (beef or pork)  Before you cook meat or poultry, cut off any visible fat  · Use less fat and oil  Try baking foods instead of frying them  Add less fat, such as margarine, sour cream, regular salad dressing and mayonnaise to foods  Eat fewer high-fat foods  Some examples of high-fat foods include french fries, doughnuts, ice cream, and cakes  · Eat fewer sweets  Limit foods and drinks that are high in sugar  This includes candy, cookies, regular soda, and sweetened drinks  Exercise:  Exercise at least 30 minutes per day on most days of the week  Some examples of exercise include walking, biking, dancing, and swimming  You can also fit in more physical activity by taking the stairs instead of the elevator or parking farther away from stores  Ask your healthcare provider about the best exercise plan for you  © Copyright [employer] 2018 Information is for End User's use only and may not be sold, redistributed or otherwise used for commercial purposes   All illustrations and images included in CareNotes® are the copyrighted property of [employer]  or [address] [place] [address]

## 2021-03-05 DIAGNOSIS — N39.0 URINARY TRACT INFECTION WITHOUT HEMATURIA, SITE UNSPECIFIED: Primary | ICD-10-CM

## 2021-03-05 RX ORDER — SULFAMETHOXAZOLE AND TRIMETHOPRIM 800; 160 MG/1; MG/1
1 TABLET ORAL 2 TIMES DAILY
Qty: 10 TABLET | Refills: 0 | Status: SHIPPED | OUTPATIENT
Start: 2021-03-05 | End: 2021-03-10

## 2021-03-05 NOTE — PROGRESS NOTES
Assessment/Plan: Ok well   Sounds like we need to rule out NPH   Lets work up the [place] urinary fequency  And memory issues and balance issues too  Also we can look for common issues like vit D HLD    We should aos follow up the Carotid dz given the worsening dizziness       1  Urinary incontinence, unspecified type  -     UA w Reflex to Microscopic w Reflex to Culture; Future; Expected date: 03/04/2021  -     MRI brain w wo contrast; Future; Expected date: 03/04/2021  -     Ambulatory referral to Neurology; Future    2  Memory impairment  -     TSH, 3rd generation with Free T4 reflex; Future; Expected date: 03/04/2021  -     MRI brain w wo contrast; Future; Expected date: 03/04/2021  -     Ambulatory referral to Neurology; Future    3  Vertigo  -     MRI brain w wo contrast; Future; Expected date: 03/04/2021  -     Ambulatory referral to Neurology; Future    4  Impaired functional mobility, balance, and endurance  -     MRI brain w wo contrast; Future; Expected date: 03/04/2021  -     Ambulatory referral to Neurology; Future    5  Vitamin D deficiency  -     Vitamin D 25 hydroxy; Future; Expected date: 03/04/2021    6  Iron deficiency  -     CBC and differential; Future; Expected date: 03/04/2021    7  B12 deficiency  -     Vitamin B12; Future; Expected date: 03/04/2021    8  Well adult exam  -     Lipid panel; Future; Expected date: 03/04/2021  -     Comprehensive metabolic panel; Future; Expected date: 03/04/2021    9  Other long term (current) drug therapy  -     Lipid panel; Future; Expected date: 03/04/2021  -     Hemoglobin A1C; Future; Expected date: 03/04/2021    10  Carotid artery disease without cerebral infarction (HCC)  -     Lipid panel; Future; Expected date: 03/04/2021  -     VAS carotid complete study; Future; Expected date: 03/04/2021         Subjective:      Patient ID: Anupam Shah is a 82 y o  female      HPI   MJ has been off an on with resistant C diff  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  and needs repeat colonoscopy in 3-4months    the only complaint is that her dizziness has worsened   fine when sitting and worse when standing up  finds she does have to hold onto something sometimes or she might fall although she has not fallen     does have frontal sinus pressure and has not had a CT of the sinuses although has hx of chronic recurrent sinus issues for which the abx lead the to Cdiff issues    AS - mild with mod AR on most recent echo  Hx of carotid artery dz (mild) but no recent US   has urinary incontinance 8-12 times a day does have a pad    Depression / mood - on zoloft     HTN; lisinopril 20mg   Anxiety: on zoloft 25mg   HLD: on lipitor 20mg  And fish oil added 2 5mg daily         The following portions of the patient's history were reviewed and updated as appropriate: allergies, current medications, past family history, past medical history, past social history, past surgical history and problem list     Review of Systems   Constitutional: Negative for fever and unexpected weight change  HENT: Negative for nosebleeds and trouble swallowing  Eyes: Negative for visual disturbance  Respiratory: Negative for chest tightness and shortness of breath  Cardiovascular: Negative for chest pain, palpitations and leg swelling  Gastrointestinal: Negative for abdominal pain, constipation, diarrhea and nausea  Endocrine: Negative for cold intolerance  Genitourinary: Negative for dysuria and urgency  Musculoskeletal: Positive for gait problem  Negative for joint swelling and myalgias  Skin: Negative for rash  Neurological: Positive for dizziness  Negative for tremors, seizures and syncope  Hematological: Does not bruise/bleed easily  Psychiatric/Behavioral: Positive for behavioral problems and decreased concentration  Negative for hallucinations and suicidal ideas           Objective:      /76 (BP Location: Left arm, Patient Position: Sitting, Cuff Size: Standard)   Pulse 75   Resp 16   Ht 5' (1 524 m)   Wt 75 3 kg (166 lb)   SpO2 97%   BMI 32 42 kg/m²     Lab on 03/04/2021   Component Date Value    WBC 03/04/2021 6 95     RBC 03/04/2021 4 96     Hemoglobin 03/04/2021 14 3     Hematocrit 03/04/2021 45 1     MCV 03/04/2021 91     MCH 03/04/2021 28 8     MCHC 03/04/2021 31 7     RDW 03/04/2021 12 5     MPV 03/04/2021 11 2     Platelets 12/34/7585 189     nRBC 03/04/2021 0     Neutrophils Relative 03/04/2021 60     Immat GRANS % 03/04/2021 0     Lymphocytes Relative 03/04/2021 29     Monocytes Relative 03/04/2021 8     Eosinophils Relative 03/04/2021 2     Basophils Relative 03/04/2021 1     Neutrophils Absolute 03/04/2021 4 23     Immature Grans Absolute 03/04/2021 0 01     Lymphocytes Absolute 03/04/2021 1 98     Monocytes Absolute 03/04/2021 0 57     Eosinophils Absolute 03/04/2021 0 11     Basophils Absolute 03/04/2021 0 05     TSH 3RD GENERATON 03/04/2021 3 470     Vit D, 25-Hydroxy 03/04/2021 33 6     Cholesterol 03/04/2021 175     Triglycerides 03/04/2021 72     HDL, Direct 03/04/2021 86     LDL Calculated 03/04/2021 75     Non-HDL-Chol (CHOL-HDL) 03/04/2021 89     Sodium 03/04/2021 139     Potassium 03/04/2021 3 9     Chloride 03/04/2021 106     CO2 03/04/2021 31     ANION GAP 03/04/2021 2*    BUN 03/04/2021 14     Creatinine 03/04/2021 1 21     Glucose, Fasting 03/04/2021 80     Calcium 03/04/2021 9 2     AST 03/04/2021 20     ALT 03/04/2021 29     Alkaline Phosphatase 03/04/2021 69     Total Protein 03/04/2021 7 4     Albumin 03/04/2021 3 9     Total Bilirubin 03/04/2021 0 66     eGFR 03/04/2021 41     Color, UA 03/04/2021 Dk Yellow     Clarity, UA 03/04/2021 Cloudy     Specific Gravity, UA 03/04/2021 1 026     pH, UA 03/04/2021 5 5     Leukocytes, UA 03/04/2021 Large*    Nitrite, UA 03/04/2021 Negative     Protein, UA 03/04/2021 30 (1+)*    Glucose, UA 03/04/2021 Negative     Ketones, UA 03/04/2021 Trace*    Urobilinogen, UA 03/04/2021 1 0     Bilirubin, UA 03/04/2021 Interference- unable to analyze*    Blood, UA 03/04/2021 Negative     Iron Saturation 03/04/2021 19     TIBC 03/04/2021 342     Iron 03/04/2021 66     Ferritin 03/04/2021 43           Physical Exam  Vitals signs and nursing note reviewed  Constitutional:       Appearance: She is well-developed  She is obese  HENT:      Head: Normocephalic and atraumatic  Neck:      Musculoskeletal: Normal range of motion and neck supple  Cardiovascular:      Rate and Rhythm: Normal rate and regular rhythm  Heart sounds: Normal heart sounds  No murmur  Pulmonary:      Effort: Pulmonary effort is normal       Breath sounds: Normal breath sounds  No wheezing or rales  Abdominal:      General: Bowel sounds are normal  There is no distension  Palpations: Abdomen is soft  Tenderness: There is no abdominal tenderness  Musculoskeletal: Normal range of motion  General: No tenderness  Lymphadenopathy:      Cervical: No cervical adenopathy  Skin:     General: Skin is warm and dry  Capillary Refill: Capillary refill takes less than 2 seconds  Findings: No rash  Neurological:      Mental Status: She is alert and oriented to person, place, and time  Cranial Nerves: No cranial nerve deficit  Sensory: No sensory deficit  Motor: No abnormal muscle tone  Psychiatric:         Behavior: Behavior normal          Thought Content:  Thought content normal          Judgment: Judgment normal              Jesse Caldwell MD  [place] [id number]

## 2021-03-08 ENCOUNTER — TELEPHONE (OUTPATIENT)
Dept: FAMILY MEDICINE CLINIC | Facility: CLINIC | Age: 86
End: 2021-03-08

## 2021-03-08 NOTE — TELEPHONE ENCOUNTER
----- Message from Maribel Armendariz MD sent at 3/5/2021  4:50 PM EST -----  uti suspected   Lets treat for 5 days   Kidneys are low but she is 85 so its not too bad     Rest is fine

## 2021-03-25 ENCOUNTER — HOSPITAL ENCOUNTER (OUTPATIENT)
Dept: VASCULAR ULTRASOUND | Facility: HOSPITAL | Age: 86
Discharge: HOME | End: 2021-03-25
Attending: FAMILY MEDICINE
Payer: COMMERCIAL

## 2021-03-25 ENCOUNTER — HOSPITAL ENCOUNTER (OUTPATIENT)
Dept: MRI IMAGING | Facility: HOSPITAL | Age: 86
Discharge: HOME | End: 2021-03-25
Attending: FAMILY MEDICINE
Payer: COMMERCIAL

## 2021-03-25 DIAGNOSIS — R41.3 MEMORY IMPAIRMENT: ICD-10-CM

## 2021-03-25 DIAGNOSIS — R32 URINARY INCONTINENCE, UNSPECIFIED TYPE: ICD-10-CM

## 2021-03-25 DIAGNOSIS — Z74.09 IMPAIRED FUNCTIONAL MOBILITY, BALANCE, AND ENDURANCE: ICD-10-CM

## 2021-03-25 DIAGNOSIS — R42 VERTIGO: ICD-10-CM

## 2021-03-25 DIAGNOSIS — I77.9 CAROTID ARTERY DISEASE WITHOUT CEREBRAL INFARCTION (HCC): ICD-10-CM

## 2021-03-25 PROCEDURE — 70553 MRI BRAIN STEM W/O & W/DYE: CPT

## 2021-03-25 PROCEDURE — 93880 EXTRACRANIAL BILAT STUDY: CPT

## 2021-03-25 PROCEDURE — A9585 GADOBUTROL INJECTION: HCPCS | Performed by: FAMILY MEDICINE

## 2021-03-25 PROCEDURE — G1004 CDSM NDSC: HCPCS

## 2021-03-25 PROCEDURE — 93880 EXTRACRANIAL BILAT STUDY: CPT | Performed by: SURGERY

## 2021-03-25 RX ADMIN — GADOBUTROL 7 ML: 604.72 INJECTION INTRAVENOUS at 15:35

## 2021-03-26 ENCOUNTER — HOSPITAL ENCOUNTER (EMERGENCY)
Facility: HOSPITAL | Age: 86
Discharge: HOME | End: 2021-03-26
Attending: EMERGENCY MEDICINE
Payer: COMMERCIAL

## 2021-03-26 VITALS
RESPIRATION RATE: 20 BRPM | BODY MASS INDEX: 31.56 KG/M2 | DIASTOLIC BLOOD PRESSURE: 53 MMHG | HEART RATE: 78 BPM | WEIGHT: 171.52 LBS | TEMPERATURE: 98.2 F | SYSTOLIC BLOOD PRESSURE: 141 MMHG | OXYGEN SATURATION: 95 % | HEIGHT: 62 IN

## 2021-03-26 DIAGNOSIS — R42 DIZZINESS: Primary | ICD-10-CM

## 2021-03-26 LAB
ALBUMIN SERPL BCP-MCNC: 3.8 G/DL (ref 3.4–4.8)
ALP SERPL-CCNC: 45 U/L (ref 35–140)
ALT SERPL W P-5'-P-CCNC: 15 U/L (ref 5–54)
ANION GAP SERPL CALCULATED.3IONS-SCNC: 6 MMOL/L (ref 4–13)
AST SERPL W P-5'-P-CCNC: 15 U/L (ref 15–41)
ATRIAL RATE: 71 BPM
BACTERIA UR QL AUTO: NORMAL /HPF
BASOPHILS # BLD AUTO: 0.02 THOUSANDS/ΜL (ref 0–0.1)
BASOPHILS NFR BLD AUTO: 0 % (ref 0–1)
BILIRUB SERPL-MCNC: 0.38 MG/DL (ref 0.3–1.2)
BILIRUB UR QL STRIP: NEGATIVE
BUN SERPL-MCNC: 24 MG/DL (ref 6–20)
CALCIUM SERPL-MCNC: 8.8 MG/DL (ref 8.4–10.2)
CHLORIDE SERPL-SCNC: 107 MMOL/L (ref 96–108)
CLARITY UR: ABNORMAL
CO2 SERPL-SCNC: 29 MMOL/L (ref 22–33)
COLOR UR: ABNORMAL
CREAT SERPL-MCNC: 1.18 MG/DL (ref 0.4–1.1)
EOSINOPHIL # BLD AUTO: 0.15 THOUSAND/ΜL (ref 0–0.61)
EOSINOPHIL NFR BLD AUTO: 3 % (ref 0–6)
ERYTHROCYTE [DISTWIDTH] IN BLOOD BY AUTOMATED COUNT: 12.5 % (ref 11.6–15.1)
GFR SERPL CREATININE-BSD FRML MDRD: 43 ML/MIN/1.73SQ M
GLUCOSE SERPL-MCNC: 113 MG/DL (ref 65–140)
GLUCOSE UR STRIP-MCNC: NEGATIVE MG/DL
HCT VFR BLD AUTO: 33.1 % (ref 34.8–46.1)
HEMOCCULT STL QL IA: POSITIVE
HGB BLD-MCNC: 10.9 G/DL (ref 11.5–15.4)
HGB UR QL STRIP.AUTO: ABNORMAL
IMM GRANULOCYTES # BLD AUTO: 0.02 THOUSAND/UL (ref 0–0.2)
IMM GRANULOCYTES NFR BLD AUTO: 0 % (ref 0–2)
KETONES UR STRIP-MCNC: NEGATIVE MG/DL
LEUKOCYTE ESTERASE UR QL STRIP: NEGATIVE
LYMPHOCYTES # BLD AUTO: 1.77 THOUSANDS/ΜL (ref 0.6–4.47)
LYMPHOCYTES NFR BLD AUTO: 32 % (ref 14–44)
MCH RBC QN AUTO: 30 PG (ref 26.8–34.3)
MCHC RBC AUTO-ENTMCNC: 32.9 G/DL (ref 31.4–37.4)
MCV RBC AUTO: 91 FL (ref 82–98)
MONOCYTES # BLD AUTO: 0.48 THOUSAND/ΜL (ref 0.17–1.22)
MONOCYTES NFR BLD AUTO: 9 % (ref 4–12)
NEUTROPHILS # BLD AUTO: 3.04 THOUSANDS/ΜL (ref 1.85–7.62)
NEUTS SEG NFR BLD AUTO: 56 % (ref 43–75)
NITRITE UR QL STRIP: NEGATIVE
NON-SQ EPI CELLS URNS QL MICRO: NORMAL /HPF
P AXIS: 71 DEGREES
PH UR STRIP.AUTO: 6 [PH]
PLATELET # BLD AUTO: 159 THOUSANDS/UL (ref 149–390)
PMV BLD AUTO: 10.8 FL (ref 8.9–12.7)
POTASSIUM SERPL-SCNC: 3.9 MMOL/L (ref 3.5–5)
PR INTERVAL: 153 MS
PROT SERPL-MCNC: 6 G/DL (ref 6.4–8.3)
PROT UR STRIP-MCNC: NEGATIVE MG/DL
QRS AXIS: -32 DEGREES
QRSD INTERVAL: 89 MS
QT INTERVAL: 399 MS
QTC INTERVAL: 434 MS
RBC # BLD AUTO: 3.63 MILLION/UL (ref 3.81–5.12)
RBC #/AREA URNS AUTO: NORMAL /HPF
SODIUM SERPL-SCNC: 142 MMOL/L (ref 133–145)
SP GR UR STRIP.AUTO: 1.01 (ref 1–1.03)
T WAVE AXIS: 65 DEGREES
TROPONIN I SERPL-MCNC: <0.03 NG/ML (ref 0–0.07)
UROBILINOGEN UR QL STRIP.AUTO: 0.2 E.U./DL
VENTRICULAR RATE: 71 BPM
WBC # BLD AUTO: 5.48 THOUSAND/UL (ref 4.31–10.16)
WBC #/AREA URNS AUTO: NORMAL /HPF

## 2021-03-26 PROCEDURE — 80053 COMPREHEN METABOLIC PANEL: CPT | Performed by: EMERGENCY MEDICINE

## 2021-03-26 PROCEDURE — 93005 ELECTROCARDIOGRAM TRACING: CPT

## 2021-03-26 PROCEDURE — G0328 FECAL BLOOD SCRN IMMUNOASSAY: HCPCS | Performed by: EMERGENCY MEDICINE

## 2021-03-26 PROCEDURE — 99284 EMERGENCY DEPT VISIT MOD MDM: CPT

## 2021-03-26 PROCEDURE — 81001 URINALYSIS AUTO W/SCOPE: CPT | Performed by: EMERGENCY MEDICINE

## 2021-03-26 PROCEDURE — 36415 COLL VENOUS BLD VENIPUNCTURE: CPT | Performed by: EMERGENCY MEDICINE

## 2021-03-26 PROCEDURE — 99285 EMERGENCY DEPT VISIT HI MDM: CPT | Performed by: EMERGENCY MEDICINE

## 2021-03-26 PROCEDURE — 96360 HYDRATION IV INFUSION INIT: CPT

## 2021-03-26 PROCEDURE — 96361 HYDRATE IV INFUSION ADD-ON: CPT

## 2021-03-26 PROCEDURE — 84484 ASSAY OF TROPONIN QUANT: CPT | Performed by: EMERGENCY MEDICINE

## 2021-03-26 PROCEDURE — 85025 COMPLETE CBC W/AUTO DIFF WBC: CPT | Performed by: EMERGENCY MEDICINE

## 2021-03-26 PROCEDURE — 81003 URINALYSIS AUTO W/O SCOPE: CPT | Performed by: EMERGENCY MEDICINE

## 2021-03-26 PROCEDURE — 93010 ELECTROCARDIOGRAM REPORT: CPT | Performed by: INTERNAL MEDICINE

## 2021-03-26 RX ORDER — MECLIZINE HYDROCHLORIDE 25 MG/1
25 TABLET ORAL 3 TIMES DAILY PRN
Qty: 30 TABLET | Refills: 0 | Status: SHIPPED | OUTPATIENT
Start: 2021-03-26 | End: 2021-04-12 | Stop reason: ALTCHOICE

## 2021-03-26 RX ORDER — MECLIZINE HCL 12.5 MG/1
25 TABLET ORAL ONCE
Status: COMPLETED | OUTPATIENT
Start: 2021-03-26 | End: 2021-03-26

## 2021-03-26 RX ADMIN — MECLIZINE HYDROCHLORIDE 25 MG: 12.5 TABLET, FILM COATED ORAL at 14:52

## 2021-03-26 RX ADMIN — SODIUM CHLORIDE 1000 ML: 0.9 INJECTION, SOLUTION INTRAVENOUS at 14:52

## 2021-03-26 NOTE — ED PROVIDER NOTES
History  Chief Complaint   Patient presents with    Dizziness     chronic dizziness, worse today  Patient is an 82-year-old female with history of hypertension, hyperlipidemia, aortic valve stenosis, who presents for dizziness  Patient says that she has had dizziness for years and that no one is able to figure out why    She is not on meclizine or any other type of medicine for her dizziness  She had an MRI ordered by her family doctor yesterday which she had done as an outpatient here at [facility]  She says that she feels like her dizziness has gotten worse since then  She says that is mainly only when she is standing and walking  She has a difficult time describing the sensation but says that is Pilar [place]ou located in her head and that she feels somewhat off balance  She denies lightheadedness or feeling she is going to pass out  She denies associated chest pains, shortness of breath, nausea, vomiting, neck pain, visual changes, abdominal pain  The patient also had a carotid artery ultrasound done yesterday which did not show any significant stenosis  Prior to Admission Medications   Prescriptions Last Dose Informant Patient Reported? Taking?    Calcium 600-200 MG-UNIT per tablet 3/26/2021 at Unknown time  Yes Yes   Sig: Take by mouth   Omega-3 Fatty Acids (FISH OIL) 1200 MG CAPS 3/26/2021 at Unknown time  Yes Yes   Sig: Take 1,200 mg by mouth   amLODIPine (NORVASC) 2 5 mg tablet 3/26/2021 at Unknown time  No Yes   Sig: Take 1 tablet (2 5 mg total) by mouth daily   aspirin (ASPIRIN 81) 81 mg chewable tablet 3/26/2021 at Unknown time  Yes Yes   Sig: Chew 81 mg   atorvastatin (LIPITOR) 20 mg tablet 3/25/2021 at Unknown time  No Yes   Sig: Take 1 tablet (20 mg total) by mouth daily   b complex-vitamin C-folic acid (NEPHROCAPS) 1 mg capsule Unknown at Unknown time  Yes No   Sig: Take 1 capsule by mouth   lisinopril (ZESTRIL) 20 mg tablet 3/26/2021 at Unknown time  No Yes   Sig: TAKE 1 TABLET BY MOUTH EVERY DAY   sertraline (ZOLOFT) 25 mg tablet 3/26/2021 at Unknown time  No Yes   Sig: TAKE 1 TABLET BY MOUTH EVERY DAY IN THE MORNING      Facility-Administered Medications: None       Past Medical History:   Diagnosis Date    Aortic valve stenosis     Depression     H/O echocardiogram 2019    High cholesterol     Hyperlipidemia     Hypertension     Hypothyroidism     Insomnia     Mitral valve regurgitation        Past Surgical History:   Procedure Laterality Date    CARDIAC ELECTROPHYSIOLOGY PROCEDURE  2019    CATARACT EXTRACTION, BILATERAL  2016    COLONOSCOPY      fecal implant    EYE SURGERY      KNEE CARTILAGE SURGERY      KNEE SURGERY Left     menuscus torn/repaired    MAMMO (HISTORICAL)      [facility]       Family History   Problem Relation Age of Onset    Sudden death Mother         cardiac    Hypertension Mother    Rylee Hose Stroke Father     Hypertension Sister     Breast cancer Sister      I have reviewed and agree with the history as documented  E-Cigarette/Vaping    E-Cigarette Use Never User      E-Cigarette/Vaping Substances     Social History     Tobacco Use    Smoking status: Former Smoker     Packs/day: 1 00     Years: 50 00     Pack years: 50 00     Quit date:      Years since quittin 2    Smokeless tobacco: Never Used   Substance Use Topics    Alcohol use: Yes     Frequency: 4 or more times a week     Drinks per session: 1 or 2    Drug use: Never       Review of Systems   Constitutional: Negative for chills, diaphoresis and fever  HENT: Negative for congestion, sinus pressure, sore throat and trouble swallowing  Eyes: Negative for pain, discharge and itching  Respiratory: Negative for cough, chest tightness, shortness of breath and wheezing  Cardiovascular: Negative for chest pain, palpitations and leg swelling  Gastrointestinal: Negative for abdominal distention, abdominal pain, blood in stool, diarrhea, nausea and vomiting     Endocrine: Negative for polyphagia and polyuria  Genitourinary: Negative for difficulty urinating, dysuria, flank pain, hematuria, pelvic pain and vaginal bleeding  Musculoskeletal: Negative for arthralgias and back pain  Skin: Negative for color change and rash  Neurological: Positive for dizziness  Negative for syncope, weakness, light-headedness and headaches  Physical Exam  Physical Exam  Vitals signs and nursing note reviewed  Constitutional:       General: She is not in acute distress  Appearance: She is well-developed  HENT:      Head: Normocephalic and atraumatic  Right Ear: External ear normal       Left Ear: External ear normal       Mouth/Throat:      Pharynx: No oropharyngeal exudate  Eyes:      Conjunctiva/sclera: Conjunctivae normal       Pupils: Pupils are equal, round, and reactive to light  Neck:      Musculoskeletal: Normal range of motion and neck supple  Cardiovascular:      Rate and Rhythm: Normal rate and regular rhythm  Heart sounds: Normal heart sounds  No murmur  No friction rub  No gallop  Pulmonary:      Effort: Pulmonary effort is normal  No respiratory distress  Breath sounds: Normal breath sounds  No wheezing or rales  Abdominal:      General: There is no distension  Palpations: Abdomen is soft  Tenderness: There is no abdominal tenderness  There is no guarding  Musculoskeletal: Normal range of motion  General: No tenderness or deformity  Lymphadenopathy:      Cervical: No cervical adenopathy  Skin:     General: Skin is warm and dry  Neurological:      General: No focal deficit present  Mental Status: She is alert and oriented to person, place, and time  Mental status is at baseline  Cranial Nerves: No cranial nerve deficit  Sensory: No sensory deficit  Motor: No weakness or abnormal muscle tone        Coordination: Coordination normal          Vital Signs  ED Triage Vitals [03/26/21 1427]   Temperature Pulse Respirations Blood Pressure SpO2   98 2 °F (36 8 °C) 78 20 147/55 95 %      Temp Source Heart Rate Source Patient Position - Orthostatic VS BP Location FiO2 (%)   Oral Monitor Lying Left arm --      Pain Score       --           Vitals:    03/26/21 1427   BP: 147/55   Pulse: 78   Patient Position - Orthostatic VS: Lying         Visual Acuity      ED Medications  Medications   sodium chloride 0 9 % bolus 1,000 mL (0 mL Intravenous Stopped 3/26/21 1655)   meclizine (ANTIVERT) tablet 25 mg (25 mg Oral Given 3/26/21 1452)       Diagnostic Studies  Results Reviewed     Procedure Component Value Units Date/Time    Urine Microscopic [[id number]]  (Normal) Collected: 03/26/21 1616    Lab Status: Final result Specimen: Urine, Clean Catch Updated: 03/26/21 1702     RBC, UA 1-2 /hpf      WBC, UA None Seen /hpf      Epithelial Cells Occasional /hpf      Bacteria, UA Occasional /hpf     UA (URINE) with reflex to Scope [[id number]]  (Abnormal) Collected: 03/26/21 1616    Lab Status: Final result Specimen: Urine, Clean Catch Updated: 03/26/21 1623     Color, UA Straw     Clarity, UA Slightly Cloudy     Specific [place], UA 1 010     pH, UA 6 0     Leukocytes, UA Negative     Nitrite, UA Negative     Protein, UA Negative mg/dl      Glucose, UA Negative mg/dl      Ketones, UA Negative mg/dl      Urobilinogen, UA 0 2 E U /dl      Bilirubin, UA Negative     Blood, UA 1+    Occult Bloood,Fecal Immunochemical [[id number]]  (Abnormal) Collected: 03/26/21 1511    Lab Status: Final result Specimen: Stool from Per Rectum Updated: 03/26/21 1548     OCCULT BLD, FECAL IMMUNOLOGICAL Positive    Narrative:        Performed by Fecal Immunochemical Test     Troponin I [[id number]]  (Normal) Collected: 03/26/21 1435    Lab Status: Final result Specimen: Blood from Arm, Left Updated: 03/26/21 1502     Troponin I <0 03 ng/mL     Comprehensive metabolic panel [[id number]]  (Abnormal) Collected: 03/26/21 1435    Lab Status: Final result Specimen: Blood from Arm, Left Updated: 03/26/21 1500     Sodium 142 mmol/L      Potassium 3 9 mmol/L      Chloride 107 mmol/L      CO2 29 mmol/L      ANION GAP 6 mmol/L      BUN 24 mg/dL      Creatinine 1 18 mg/dL      Glucose 113 mg/dL      Calcium 8 8 mg/dL      AST 15 U/L      ALT 15 U/L      Alkaline Phosphatase 45 0 U/L      Total Protein 6 0 g/dL      Albumin 3 8 g/dL      Total Bilirubin 0 38 mg/dL      eGFR 43 ml/min/1 73sq m     Narrative:      [place] guidelines for Chronic Kidney Disease (CKD):     Stage 1 with normal or high GFR (GFR > 90 mL/min/1 73 square meters)    Stage 2 Mild CKD (GFR = 60-89 mL/min/1 73 square meters)    Stage 3A Moderate CKD (GFR = 45-59 mL/min/1 73 square meters)    Stage 3B Moderate CKD (GFR = 30-44 mL/min/1 73 square meters)    Stage 4 Severe CKD (GFR = 15-29 mL/min/1 73 square meters)    Stage 5 End Stage CKD (GFR <15 mL/min/1 73 square meters)  Note: GFR calculation is accurate only with a steady state creatinine    CBC and differential [[id number]]  (Abnormal) Collected: 03/26/21 1435    Lab Status: Final result Specimen: Blood from Arm, Left Updated: 03/26/21 1444     WBC 5 48 Thousand/uL      RBC 3 63 Million/uL      Hemoglobin 10 9 g/dL      Hematocrit 33 1 %      MCV 91 fL      MCH 30 0 pg      MCHC 32 9 g/dL      RDW 12 5 %      MPV 10 8 fL      Platelets 806 Thousands/uL      Neutrophils Relative 56 %      Immat GRANS % 0 %      Lymphocytes Relative 32 %      Monocytes Relative 9 %      Eosinophils Relative 3 %      Basophils Relative 0 %      Neutrophils Absolute 3 04 Thousands/µL      Immature Grans Absolute 0 02 Thousand/uL      Lymphocytes Absolute 1 77 Thousands/µL      Monocytes Absolute 0 48 Thousand/µL      Eosinophils Absolute 0 15 Thousand/µL      Basophils Absolute 0 02 Thousands/µL                  No orders to display              Procedures  Procedures         ED Course  ED Course as of Mar 26 1716   Fri Mar 26, 2021   1515 Patient denies any dark or bloody stools  Rectal exam performed given 3 4 drop in Hgb over 4 weeks  Stools are not dark or bloody  Will send for hemocult test       1604 Patient able to ambulate to the bathroom on her own  She was able to use the restroom on her own with no difficulty  She says that she feels "a little bit dizzy "        1625 I spoke with the patient's daughter who is bedside and talked to her about the lab work as well as the imaging that the patient had  She also says that this dizziness has been going on for "years " I explained to her that the MRI was normal, and that her carotid ultrasound was normal as well  I explained to her that I am not sure how much more could be done in terms of a workup if the patient was admitted  I told the daughter that if she does not feel comfortable with the patient going home by herself, we could admit her and have Physical therapy see her  [id number] Daughter says that she will be with the patient tonight  She feels like she is safe to go home  Told her that she should make a follow-up appointment with the neurologist and her family doctor  SBIRT 20yo+      Most Recent Value   SBIRT (24 yo +)   In order to provide better care to our patients, we are screening all of our patients for alcohol and drug use  Would it be okay to ask you these screening questions? Yes Filed at: 03/26/2021 1443   Initial Alcohol Screen: US AUDIT-C    1  How often do you have a drink containing alcohol?  0 Filed at: 03/26/2021 1443   2  How many drinks containing alcohol do you have on a typical day you are drinking? 0 Filed at: 03/26/2021 1443   3a  Male UNDER 65: How often do you have five or more drinks on one occasion? 0 Filed at: 03/26/2021 1443   3b  FEMALE Any Age, or MALE 65+: How often do you have 4 or more drinks on one occassion?   0 Filed at: 03/26/2021 1443   Audit-C Score  0 Filed at: 03/26/2021 1443   AMMY: How many times in the past year have you    Used an illegal drug or used a prescription medication for non-medical reasons? Never Filed at: 03/26/2021 1443                    [facility]  Number of Diagnoses or Management Options  Diagnosis management comments: 42-year-old female presenting for dizziness  Has had dizziness for "years" but says that it has gotten worse since her MRI yesterday  Says it is only when she is standing and walking around  Describes it is somewhat feeling off balance  Denies headache, nausea, vomiting, chest pain shortness of breath  No focal neurologic deficits on exam   Vitals within normal limits  Will obtain cardiac workup  Had an outpatient MRI done yesterday which has not been read  Will have Radiology read the MRI  Will give meclizine  Will give IV fluids        Disposition  Final diagnoses:   Dizziness - chronic     Time reflects when diagnosis was documented in both MDM as applicable and the Disposition within this note     Time User Action Codes Description Comment    3/26/2021  5:12 PM Jannet Gar Add [R42] Dizziness     3/26/2021  5:12 PM Jannet Gar Modify [R42] Dizziness chronic      ED Disposition     ED Disposition Condition Date/Time Comment    Discharge Stable Fri Mar 26, 2021  5:12 PM Maria Eugenia Coronado discharge to home/self care              Follow-up Information     Follow up With Specialties Details Why Contact Info Additional Information    Tona Richey MD Family Medicine Schedule an appointment as soon as possible for a visit  For follow up of dizziness 2003 [address] [facility] Schedule an appointment as soon as possible for a visit  For follow up of dizziness Jesse [address] [place] [address] 2540 [facility] [address], [facility]          Patient's Medications   Discharge Prescriptions MECLIZINE (ANTIVERT) 25 MG TABLET    Take 1 tablet (25 mg total) by mouth 3 (three) times a day as needed for dizziness       Start Date: 3/26/2021 End Date: --       Order Dose: 25 mg       Quantity: 30 tablet    Refills: 0         PDMP Review       Value Time User    PDMP Reviewed  Yes 8/31/2020  3:36 PM Jesse Caldwell MD          ED Provider  Electronically Signed by           Reji Gates DO  03/26/21 9296

## 2021-03-26 NOTE — ED NOTES
Patient's daughter called and stated that she will be at the hospital to visit her mother within the hour     Shruthi Villanueva RN  03/26/21 0859

## 2021-03-30 ENCOUNTER — OFFICE VISIT (OUTPATIENT)
Dept: FAMILY MEDICINE CLINIC | Facility: CLINIC | Age: 86
End: 2021-03-30
Payer: COMMERCIAL

## 2021-03-30 ENCOUNTER — TELEPHONE (OUTPATIENT)
Dept: UROLOGY | Facility: CLINIC | Age: 86
End: 2021-03-30

## 2021-03-30 VITALS
RESPIRATION RATE: 16 BRPM | SYSTOLIC BLOOD PRESSURE: 138 MMHG | BODY MASS INDEX: 31.47 KG/M2 | HEART RATE: 73 BPM | OXYGEN SATURATION: 98 % | DIASTOLIC BLOOD PRESSURE: 74 MMHG | HEIGHT: 62 IN | WEIGHT: 171 LBS

## 2021-03-30 DIAGNOSIS — D50.0 IRON DEFICIENCY ANEMIA DUE TO CHRONIC BLOOD LOSS: Primary | ICD-10-CM

## 2021-03-30 PROCEDURE — 1036F TOBACCO NON-USER: CPT | Performed by: FAMILY MEDICINE

## 2021-03-30 PROCEDURE — 99214 OFFICE O/P EST MOD 30 MIN: CPT | Performed by: FAMILY MEDICINE

## 2021-03-30 NOTE — PROGRESS NOTES
Assessment/Plan:  I reached out to GI the [facility] CHILDREN text and we will schedule the patient as soon as possible for workup on dizziness and anemia  Olla Mcardle can stop her meclizine as it was not helping any ways I also called Masah Monday daughter and ended up leaving her a voicemail with the assessment and plan    1  Iron deficiency anemia due to chronic blood loss  -     Ambulatory referral to Gastroenterology; Future         Subjective:      Patient ID: Panda Leblanc is a 82 y o  female  HPI   MJ is here today for follow-up from the emergency room we sent her there last week for worsening dizziness and the last couple weeks she does have a history of chronic dizziness for which we have not really found a cause we have noticed about a 3 point drop in her hemoglobin and FOBT was positive in the ER and we will reach out to the gastroenterologist for further workup and evaluation    July 27th chicho with neuro - dr Adela Garay is dark NICKO says which is new     Finds her memory is gone which corleates to the dizziness worsening           The following portions of the patient's history were reviewed and updated as appropriate: allergies, current medications, past family history, past medical history, past social history, past surgical history and problem list     Review of Systems   Constitutional: Negative for fever and unexpected weight change  HENT: Negative for nosebleeds and trouble swallowing  Eyes: Negative for visual disturbance  Respiratory: Negative for chest tightness and shortness of breath  Cardiovascular: Negative for chest pain, palpitations and leg swelling  Gastrointestinal: Negative for abdominal pain, constipation, diarrhea and nausea  Endocrine: Negative for cold intolerance  Genitourinary: Negative for dysuria and urgency  Musculoskeletal: Positive for gait problem  Negative for joint swelling and myalgias  Skin: Negative for rash  Neurological: Positive for dizziness  Negative for tremors, seizures and syncope  Hematological: Does not bruise/bleed easily  Psychiatric/Behavioral: Positive for behavioral problems and decreased concentration  Negative for hallucinations and suicidal ideas           Objective:      /74 (BP Location: Left arm, Patient Position: Sitting, Cuff Size: Standard)   Pulse 73   Resp 16   Ht 5' 2" (1 575 m)   Wt 77 6 kg (171 lb)   SpO2 98%   BMI 31 28 kg/m²     Admission on 03/26/2021, Discharged on 03/26/2021   Component Date Value    WBC 03/26/2021 5 48     RBC 03/26/2021 3 63*    Hemoglobin 03/26/2021 10 9*    Hematocrit 03/26/2021 33 1*    MCV 03/26/2021 91     MCH 03/26/2021 30 0     MCHC 03/26/2021 32 9     RDW 03/26/2021 12 5     MPV 03/26/2021 10 8     Platelets 67/97/2914 159     Neutrophils Relative 03/26/2021 56     Immat GRANS % 03/26/2021 0     Lymphocytes Relative 03/26/2021 32     Monocytes Relative 03/26/2021 9     Eosinophils Relative 03/26/2021 3     Basophils Relative 03/26/2021 0     Neutrophils Absolute 03/26/2021 3 04     Immature Grans Absolute 03/26/2021 0 02     Lymphocytes Absolute 03/26/2021 1 77     Monocytes Absolute 03/26/2021 0 48     Eosinophils Absolute 03/26/2021 0 15     Basophils Absolute 03/26/2021 0 02     Sodium 03/26/2021 142     Potassium 03/26/2021 3 9     Chloride 03/26/2021 107     CO2 03/26/2021 29     ANION GAP 03/26/2021 6     BUN 03/26/2021 24*    Creatinine 03/26/2021 1 18*    Glucose 03/26/2021 113     Calcium 03/26/2021 8 8     AST 03/26/2021 15     ALT 03/26/2021 15     Alkaline Phosphatase 03/26/2021 45 0     Total Protein 03/26/2021 6 0*    Albumin 03/26/2021 3 8     Total Bilirubin 03/26/2021 0 38     eGFR 03/26/2021 43     Troponin I 03/26/2021 <0 03     Color, UA 03/26/2021 Straw*    Clarity, UA 03/26/2021 Slightly Cloudy*    Specific Gravity, UA 03/26/2021 1 010     pH, UA 03/26/2021 6 0     Leukocytes, UA 03/26/2021 Negative     Nitrite, UA 03/26/2021 Negative     Protein, UA 03/26/2021 Negative     Glucose, UA 03/26/2021 Negative     Ketones, UA 03/26/2021 Negative     Urobilinogen, UA 03/26/2021 0 2     Bilirubin, UA 03/26/2021 Negative     Blood, UA 03/26/2021 1+*    Ventricular Rate 03/26/2021 71     Atrial Rate 03/26/2021 71     AL Interval 03/26/2021 153     QRSD Interval 03/26/2021 89     QT Interval 03/26/2021 399     QTC Interval 03/26/2021 434     P Axis 03/26/2021 71     QRS Axis 03/26/2021 -32     T Wave Axis 03/26/2021 65     OCCULT BLD, FECAL IMMUNO* 03/26/2021 Positive*    RBC, UA 03/26/2021 1-2     WBC, UA 03/26/2021 None Seen     Epithelial Cells 03/26/2021 Occasional     Bacteria, UA 03/26/2021 Occasional           Physical Exam  Vitals signs and nursing note reviewed  Constitutional:       Appearance: She is well-developed  HENT:      Head: Normocephalic and atraumatic  Neck:      Musculoskeletal: Normal range of motion and neck supple  Cardiovascular:      Rate and Rhythm: Normal rate and regular rhythm  Heart sounds: Normal heart sounds  No murmur  Pulmonary:      Effort: Pulmonary effort is normal       Breath sounds: Normal breath sounds  No wheezing or rales  Abdominal:      General: Bowel sounds are normal  There is no distension  Palpations: Abdomen is soft  Tenderness: There is no abdominal tenderness  Musculoskeletal: Normal range of motion  General: No tenderness  Lymphadenopathy:      Cervical: No cervical adenopathy  Skin:     General: Skin is warm and dry  Capillary Refill: Capillary refill takes less than 2 seconds  Findings: No rash  Neurological:      Mental Status: She is alert and oriented to person, place, and time  Cranial Nerves: No cranial nerve deficit  Sensory: No sensory deficit  Motor: No abnormal muscle tone  Psychiatric:         Behavior: Behavior normal          Thought Content:  Thought content normal  Judgment: Judgment normal              MD Maxi Alvarez

## 2021-03-30 NOTE — TELEPHONE ENCOUNTER
Can you please schedule for anemia and potential GI bleed? Check to see if she is a NP - if so, she can see any of the PA's or providers

## 2021-04-05 ENCOUNTER — TELEPHONE (OUTPATIENT)
Dept: FAMILY MEDICINE CLINIC | Facility: CLINIC | Age: 86
End: 2021-04-05

## 2021-04-05 NOTE — TELEPHONE ENCOUNTER
Patient called in and stated her dizziness is worse than ever  I spoke with Dr Goodwin Massed in regards to this and he stated she is to keep her appointment with GI on Wed 4/7/21 and we will go from there to see what's going on  If she gets even worse, she is to go to the ER  Patient advised and is ok with it

## 2021-04-06 ENCOUNTER — OFFICE VISIT (OUTPATIENT)
Dept: URGENT CARE | Facility: CLINIC | Age: 86
End: 2021-04-06
Payer: COMMERCIAL

## 2021-04-06 ENCOUNTER — HOSPITAL ENCOUNTER (INPATIENT)
Facility: HOSPITAL | Age: 86
LOS: 3 days | Discharge: HOME HEALTH | DRG: 378 | End: 2021-04-09
Attending: EMERGENCY MEDICINE | Admitting: INTERNAL MEDICINE
Payer: COMMERCIAL

## 2021-04-06 VITALS
WEIGHT: 168 LBS | HEIGHT: 62 IN | OXYGEN SATURATION: 98 % | TEMPERATURE: 98.8 F | HEART RATE: 88 BPM | DIASTOLIC BLOOD PRESSURE: 63 MMHG | SYSTOLIC BLOOD PRESSURE: 147 MMHG | RESPIRATION RATE: 20 BRPM | BODY MASS INDEX: 30.91 KG/M2

## 2021-04-06 DIAGNOSIS — R42 DIZZINESSES: Primary | ICD-10-CM

## 2021-04-06 DIAGNOSIS — K92.2 UGIB (UPPER GASTROINTESTINAL BLEED): ICD-10-CM

## 2021-04-06 DIAGNOSIS — K92.2 GASTROINTESTINAL HEMORRHAGE, UNSPECIFIED GASTROINTESTINAL HEMORRHAGE TYPE: ICD-10-CM

## 2021-04-06 DIAGNOSIS — D62 ACUTE BLOOD LOSS ANEMIA: Primary | ICD-10-CM

## 2021-04-06 DIAGNOSIS — K92.1 COMPLAINT OF MELENA: ICD-10-CM

## 2021-04-06 DIAGNOSIS — D64.9 ANEMIA: ICD-10-CM

## 2021-04-06 PROBLEM — E78.5 HYPERLIPIDEMIA: Status: ACTIVE | Noted: 2021-04-06

## 2021-04-06 PROBLEM — I10 HYPERTENSION: Status: ACTIVE | Noted: 2021-04-06

## 2021-04-06 LAB
ABO GROUP BLD: NORMAL
ALBUMIN SERPL BCP-MCNC: 4 G/DL (ref 3.4–4.8)
ALP SERPL-CCNC: 40.5 U/L (ref 35–140)
ALT SERPL W P-5'-P-CCNC: 15 U/L (ref 5–54)
ANION GAP SERPL CALCULATED.3IONS-SCNC: 10 MMOL/L (ref 4–13)
AST SERPL W P-5'-P-CCNC: 17 U/L (ref 15–41)
ATRIAL RATE: 90 BPM
BASOPHILS # BLD AUTO: 0.02 THOUSANDS/ΜL (ref 0–0.1)
BASOPHILS NFR BLD AUTO: 0 % (ref 0–1)
BILIRUB SERPL-MCNC: 0.36 MG/DL (ref 0.3–1.2)
BLD GP AB SCN SERPL QL: NEGATIVE
BUN SERPL-MCNC: 20 MG/DL (ref 6–20)
CALCIUM SERPL-MCNC: 9.4 MG/DL (ref 8.4–10.2)
CHLORIDE SERPL-SCNC: 107 MMOL/L (ref 96–108)
CO2 SERPL-SCNC: 25 MMOL/L (ref 22–33)
CREAT SERPL-MCNC: 1.23 MG/DL (ref 0.4–1.1)
EOSINOPHIL # BLD AUTO: 0.13 THOUSAND/ΜL (ref 0–0.61)
EOSINOPHIL NFR BLD AUTO: 2 % (ref 0–6)
ERYTHROCYTE [DISTWIDTH] IN BLOOD BY AUTOMATED COUNT: 14.7 % (ref 11.6–15.1)
GFR SERPL CREATININE-BSD FRML MDRD: 41 ML/MIN/1.73SQ M
GLUCOSE SERPL-MCNC: 100 MG/DL (ref 65–140)
GLUCOSE SERPL-MCNC: 120 MG/DL (ref 65–140)
HCT VFR BLD AUTO: 23 % (ref 34.8–46.1)
HCT VFR BLD AUTO: 23.3 % (ref 34.8–46.1)
HCT VFR BLD AUTO: 25.5 % (ref 34.8–46.1)
HGB BLD-MCNC: 7.2 G/DL (ref 11.5–15.4)
HGB BLD-MCNC: 7.3 G/DL (ref 11.5–15.4)
HGB BLD-MCNC: 8 G/DL (ref 11.5–15.4)
IMM GRANULOCYTES # BLD AUTO: 0.01 THOUSAND/UL (ref 0–0.2)
IMM GRANULOCYTES NFR BLD AUTO: 0 % (ref 0–2)
LYMPHOCYTES # BLD AUTO: 1.95 THOUSANDS/ΜL (ref 0.6–4.47)
LYMPHOCYTES NFR BLD AUTO: 30 % (ref 14–44)
MAGNESIUM SERPL-MCNC: 2 MG/DL (ref 1.6–2.6)
MCH RBC QN AUTO: 29.4 PG (ref 26.8–34.3)
MCHC RBC AUTO-ENTMCNC: 31.4 G/DL (ref 31.4–37.4)
MCV RBC AUTO: 94 FL (ref 82–98)
MONOCYTES # BLD AUTO: 0.52 THOUSAND/ΜL (ref 0.17–1.22)
MONOCYTES NFR BLD AUTO: 8 % (ref 4–12)
NEUTROPHILS # BLD AUTO: 3.97 THOUSANDS/ΜL (ref 1.85–7.62)
NEUTS SEG NFR BLD AUTO: 60 % (ref 43–75)
P AXIS: 73 DEGREES
PLATELET # BLD AUTO: 213 THOUSANDS/UL (ref 149–390)
PMV BLD AUTO: 10.4 FL (ref 8.9–12.7)
POTASSIUM SERPL-SCNC: 3.8 MMOL/L (ref 3.5–5)
PR INTERVAL: 149 MS
PROT SERPL-MCNC: 6.4 G/DL (ref 6.4–8.3)
QRS AXIS: -19 DEGREES
QRSD INTERVAL: 85 MS
QT INTERVAL: 359 MS
QTC INTERVAL: 445 MS
RBC # BLD AUTO: 2.72 MILLION/UL (ref 3.81–5.12)
RH BLD: POSITIVE
SODIUM SERPL-SCNC: 142 MMOL/L (ref 133–145)
SPECIMEN EXPIRATION DATE: NORMAL
T WAVE AXIS: 62 DEGREES
TROPONIN I SERPL-MCNC: <0.03 NG/ML (ref 0–0.07)
TSH SERPL DL<=0.05 MIU/L-ACNC: 2.37 UIU/ML (ref 0.34–5.6)
VENTRICULAR RATE: 92 BPM
WBC # BLD AUTO: 6.6 THOUSAND/UL (ref 4.31–10.16)

## 2021-04-06 PROCEDURE — 80053 COMPREHEN METABOLIC PANEL: CPT | Performed by: PHYSICIAN ASSISTANT

## 2021-04-06 PROCEDURE — 86920 COMPATIBILITY TEST SPIN: CPT

## 2021-04-06 PROCEDURE — 84443 ASSAY THYROID STIM HORMONE: CPT | Performed by: PHYSICIAN ASSISTANT

## 2021-04-06 PROCEDURE — 86850 RBC ANTIBODY SCREEN: CPT | Performed by: PHYSICIAN ASSISTANT

## 2021-04-06 PROCEDURE — 85025 COMPLETE CBC W/AUTO DIFF WBC: CPT | Performed by: PHYSICIAN ASSISTANT

## 2021-04-06 PROCEDURE — 86901 BLOOD TYPING SEROLOGIC RH(D): CPT | Performed by: PHYSICIAN ASSISTANT

## 2021-04-06 PROCEDURE — 84484 ASSAY OF TROPONIN QUANT: CPT | Performed by: PHYSICIAN ASSISTANT

## 2021-04-06 PROCEDURE — 99223 1ST HOSP IP/OBS HIGH 75: CPT | Performed by: INTERNAL MEDICINE

## 2021-04-06 PROCEDURE — C9113 INJ PANTOPRAZOLE SODIUM, VIA: HCPCS | Performed by: PHYSICIAN ASSISTANT

## 2021-04-06 PROCEDURE — 93005 ELECTROCARDIOGRAM TRACING: CPT

## 2021-04-06 PROCEDURE — 99285 EMERGENCY DEPT VISIT HI MDM: CPT

## 2021-04-06 PROCEDURE — 85014 HEMATOCRIT: CPT | Performed by: INTERNAL MEDICINE

## 2021-04-06 PROCEDURE — 83735 ASSAY OF MAGNESIUM: CPT | Performed by: PHYSICIAN ASSISTANT

## 2021-04-06 PROCEDURE — 36415 COLL VENOUS BLD VENIPUNCTURE: CPT | Performed by: PHYSICIAN ASSISTANT

## 2021-04-06 PROCEDURE — 99285 EMERGENCY DEPT VISIT HI MDM: CPT | Performed by: PHYSICIAN ASSISTANT

## 2021-04-06 PROCEDURE — S9083 URGENT CARE CENTER GLOBAL: HCPCS | Performed by: PHYSICIAN ASSISTANT

## 2021-04-06 PROCEDURE — 85018 HEMOGLOBIN: CPT | Performed by: INTERNAL MEDICINE

## 2021-04-06 PROCEDURE — 82948 REAGENT STRIP/BLOOD GLUCOSE: CPT | Performed by: PHYSICIAN ASSISTANT

## 2021-04-06 PROCEDURE — 30233N1 TRANSFUSION OF NONAUTOLOGOUS RED BLOOD CELLS INTO PERIPHERAL VEIN, PERCUTANEOUS APPROACH: ICD-10-PCS | Performed by: INTERNAL MEDICINE

## 2021-04-06 PROCEDURE — 86900 BLOOD TYPING SEROLOGIC ABO: CPT | Performed by: PHYSICIAN ASSISTANT

## 2021-04-06 PROCEDURE — 93010 ELECTROCARDIOGRAM REPORT: CPT | Performed by: INTERNAL MEDICINE

## 2021-04-06 PROCEDURE — 93005 ELECTROCARDIOGRAM TRACING: CPT | Performed by: PHYSICIAN ASSISTANT

## 2021-04-06 PROCEDURE — 99213 OFFICE O/P EST LOW 20 MIN: CPT | Performed by: PHYSICIAN ASSISTANT

## 2021-04-06 PROCEDURE — 83540 ASSAY OF IRON: CPT | Performed by: INTERNAL MEDICINE

## 2021-04-06 PROCEDURE — 83550 IRON BINDING TEST: CPT | Performed by: INTERNAL MEDICINE

## 2021-04-06 PROCEDURE — 85610 PROTHROMBIN TIME: CPT | Performed by: INTERNAL MEDICINE

## 2021-04-06 PROCEDURE — 82728 ASSAY OF FERRITIN: CPT | Performed by: INTERNAL MEDICINE

## 2021-04-06 RX ORDER — LISINOPRIL 20 MG/1
20 TABLET ORAL DAILY
Status: DISCONTINUED | OUTPATIENT
Start: 2021-04-07 | End: 2021-04-07

## 2021-04-06 RX ORDER — ONDANSETRON 2 MG/ML
4 INJECTION INTRAMUSCULAR; INTRAVENOUS EVERY 6 HOURS PRN
Status: DISCONTINUED | OUTPATIENT
Start: 2021-04-06 | End: 2021-04-09 | Stop reason: HOSPADM

## 2021-04-06 RX ORDER — SODIUM CHLORIDE, SODIUM LACTATE, POTASSIUM CHLORIDE, CALCIUM CHLORIDE 600; 310; 30; 20 MG/100ML; MG/100ML; MG/100ML; MG/100ML
100 INJECTION, SOLUTION INTRAVENOUS CONTINUOUS
Status: DISCONTINUED | OUTPATIENT
Start: 2021-04-06 | End: 2021-04-06

## 2021-04-06 RX ORDER — AMLODIPINE BESYLATE 2.5 MG/1
2.5 TABLET ORAL DAILY
Status: DISCONTINUED | OUTPATIENT
Start: 2021-04-07 | End: 2021-04-07

## 2021-04-06 RX ORDER — ACETAMINOPHEN 325 MG/1
650 TABLET ORAL EVERY 6 HOURS PRN
Status: DISCONTINUED | OUTPATIENT
Start: 2021-04-06 | End: 2021-04-09 | Stop reason: HOSPADM

## 2021-04-06 RX ORDER — PANTOPRAZOLE SODIUM 40 MG/1
40 INJECTION, POWDER, FOR SOLUTION INTRAVENOUS ONCE
Status: COMPLETED | OUTPATIENT
Start: 2021-04-06 | End: 2021-04-06

## 2021-04-06 RX ORDER — ATORVASTATIN CALCIUM 20 MG/1
20 TABLET, FILM COATED ORAL
Status: DISCONTINUED | OUTPATIENT
Start: 2021-04-07 | End: 2021-04-09 | Stop reason: HOSPADM

## 2021-04-06 RX ORDER — PANTOPRAZOLE SODIUM 40 MG/1
40 INJECTION, POWDER, FOR SOLUTION INTRAVENOUS EVERY 12 HOURS
Status: DISCONTINUED | OUTPATIENT
Start: 2021-04-06 | End: 2021-04-06

## 2021-04-06 RX ORDER — SERTRALINE HYDROCHLORIDE 25 MG/1
25 TABLET, FILM COATED ORAL EVERY MORNING
Status: DISCONTINUED | OUTPATIENT
Start: 2021-04-07 | End: 2021-04-09 | Stop reason: HOSPADM

## 2021-04-06 RX ORDER — SODIUM CHLORIDE, SODIUM GLUCONATE, SODIUM ACETATE, POTASSIUM CHLORIDE, MAGNESIUM CHLORIDE, SODIUM PHOSPHATE, DIBASIC, AND POTASSIUM PHOSPHATE .53; .5; .37; .037; .03; .012; .00082 G/100ML; G/100ML; G/100ML; G/100ML; G/100ML; G/100ML; G/100ML
75 INJECTION, SOLUTION INTRAVENOUS CONTINUOUS
Status: DISCONTINUED | OUTPATIENT
Start: 2021-04-06 | End: 2021-04-07

## 2021-04-06 RX ORDER — CHOLECALCIFEROL (VITAMIN D3) 10 MCG
1 TABLET ORAL
Status: DISCONTINUED | OUTPATIENT
Start: 2021-04-07 | End: 2021-04-09 | Stop reason: HOSPADM

## 2021-04-06 RX ADMIN — PANTOPRAZOLE SODIUM 40 MG: 40 INJECTION, POWDER, FOR SOLUTION INTRAVENOUS at 19:11

## 2021-04-06 RX ADMIN — SODIUM CHLORIDE, SODIUM GLUCONATE, SODIUM ACETATE, POTASSIUM CHLORIDE, MAGNESIUM CHLORIDE, SODIUM PHOSPHATE, DIBASIC, AND POTASSIUM PHOSPHATE 100 ML/HR: .53; .5; .37; .037; .03; .012; .00082 INJECTION, SOLUTION INTRAVENOUS at 20:41

## 2021-04-06 RX ADMIN — SODIUM CHLORIDE, SODIUM LACTATE, POTASSIUM CHLORIDE, AND CALCIUM CHLORIDE 100 ML/HR: .6; .31; .03; .02 INJECTION, SOLUTION INTRAVENOUS at 20:11

## 2021-04-06 NOTE — ED PROVIDER NOTES
History  Chief Complaint   Patient presents with    Dizziness     For 10yrs +, was sent by PCP for lab work      68-year-old female comes in today for dizziness for greater than 1 year  She reports that recently and has been much worse with change in position and she has been feeling a little woozy    She reports that she saw her primary care physician who sent her to the ER for lab work  She was seen here on 03/26/2021 and was diagnosed with a GI bleed and at that point her hemoglobin had dropped from 14->11  She was advised to follow up with GI as an outpatient, however patient tells me that she canceled her follow-up appointment tomorrow because she did not know why she was seen GI  She reports that she has been having dark tarry stools  She denies any abdominal pain  Prior to Admission Medications   Prescriptions Last Dose Informant Patient Reported? Taking?    Calcium 600-200 MG-UNIT per tablet   Yes No   Sig: Take by mouth   Omega-3 Fatty Acids (FISH OIL) 1200 MG CAPS   Yes No   Sig: Take 1,200 mg by mouth   amLODIPine (NORVASC) 2 5 mg tablet   No No   Sig: Take 1 tablet (2 5 mg total) by mouth daily   aspirin (ASPIRIN 81) 81 mg chewable tablet   Yes No   Sig: Chew 81 mg   atorvastatin (LIPITOR) 20 mg tablet   No No   Sig: Take 1 tablet (20 mg total) by mouth daily   b complex-vitamin C-folic acid (NEPHROCAPS) 1 mg capsule   Yes No   Sig: Take 1 capsule by mouth   lisinopril (ZESTRIL) 20 mg tablet   No No   Sig: TAKE 1 TABLET BY MOUTH EVERY DAY   meclizine (ANTIVERT) 25 mg tablet   No No   Sig: Take 1 tablet (25 mg total) by mouth 3 (three) times a day as needed for dizziness   Patient not taking: Reported on 4/6/2021   sertraline (ZOLOFT) 25 mg tablet   No No   Sig: TAKE 1 TABLET BY MOUTH EVERY DAY IN THE MORNING      Facility-Administered Medications: None       Past Medical History:   Diagnosis Date    Aortic valve stenosis     Depression     H/O echocardiogram 08/2019    High cholesterol  Hyperlipidemia     Hypertension     Hypothyroidism     Insomnia     Mitral valve regurgitation        Past Surgical History:   Procedure Laterality Date    CARDIAC ELECTROPHYSIOLOGY PROCEDURE  2019    CATARACT EXTRACTION, BILATERAL  2016    COLONOSCOPY  2019    fecal implant    EYE SURGERY      KNEE CARTILAGE SURGERY      KNEE SURGERY Left     menuscus torn/repaired    MAMMO (HISTORICAL)      [facility]       Family History   Problem Relation Age of Onset    Sudden death Mother         cardiac    Hypertension Mother    Otilia Lamar Stroke Father     Hypertension Sister     Breast cancer Sister      I have reviewed and agree with the history as documented  E-Cigarette/Vaping    E-Cigarette Use Never User      E-Cigarette/Vaping Substances     Social History     Tobacco Use    Smoking status: Former Smoker     Packs/day: 1 00     Years: 50 00     Pack years: 50 00     Quit date:      Years since quittin 2    Smokeless tobacco: Never Used   Substance Use Topics    Alcohol use: Yes     Frequency: 4 or more times a week     Drinks per session: 1 or 2    Drug use: Never       Review of Systems   Constitutional: Negative for fever  HENT: Negative for nosebleeds  Eyes: Negative for redness  Respiratory: Negative for shortness of breath  Cardiovascular: Negative for chest pain  Gastrointestinal: Positive for blood in stool  Genitourinary: Negative for hematuria  Musculoskeletal: Negative for gait problem  Skin: Negative for rash  Neurological: Positive for dizziness  Negative for seizures  Psychiatric/Behavioral: Negative for behavioral problems  Physical Exam  Physical Exam  Vitals signs and nursing note reviewed  Constitutional:       General: She is not in acute distress  Appearance: Normal appearance  She is not ill-appearing or toxic-appearing  Comments: Pleasant   HENT:      Head: Normocephalic and atraumatic     Eyes:      Extraocular Movements: Extraocular movements intact  Neck:      Musculoskeletal: Normal range of motion  Cardiovascular:      Rate and Rhythm: Normal rate and regular rhythm  Pulmonary:      Effort: Pulmonary effort is normal       Breath sounds: Normal breath sounds  Abdominal:      General: Abdomen is flat  Bowel sounds are normal       Palpations: Abdomen is soft  Tenderness: There is no abdominal tenderness  Musculoskeletal: Normal range of motion  Skin:     General: Skin is warm and dry  Neurological:      General: No focal deficit present  Mental Status: She is alert     Psychiatric:         Mood and Affect: Mood normal          Behavior: Behavior normal          Vital Signs  ED Triage Vitals   Temperature Pulse Respirations Blood Pressure SpO2   04/06/21 1718 04/06/21 1718 04/06/21 1718 04/06/21 1718 04/06/21 1718   98 7 °F (37 1 °C) 94 18 134/57 94 %      Temp Source Heart Rate Source Patient Position - Orthostatic VS BP Location FiO2 (%)   04/06/21 2010 04/06/21 2010 04/06/21 1731 04/06/21 1731 --   Tympanic Monitor Lying - Orthostatic VS Left arm       Pain Score       04/06/21 2010       No Pain           Vitals:    04/06/21 1732 04/06/21 1733 04/06/21 2010 04/06/21 2015   BP: (!) 123/46 117/50 137/69 144/65   Pulse: 89 101 77 90   Patient Position - Orthostatic VS: Sitting - Orthostatic VS Standing - Orthostatic VS Lying Sitting         Visual Acuity      ED Medications  Medications   amLODIPine (NORVASC) tablet 2 5 mg (has no administration in time range)   atorvastatin (LIPITOR) tablet 20 mg (has no administration in time range)   b complex-vitamin C-folic acid (NEPHROCAPS) capsule 1 capsule (has no administration in time range)   lisinopril (ZESTRIL) tablet 20 mg (has no administration in time range)   sertraline (ZOLOFT) tablet 25 mg (has no administration in time range)   multi-electrolyte (PLASMALYTE-A/ISOLYTE-S PH 7 4) IV solution (has no administration in time range)   ondansetron (ZOFRAN) injection 4 mg (has no administration in time range)   pantoprazole (PROTONIX) injection 40 mg (has no administration in time range)   pantoprazole (PROTONIX) injection 40 mg (40 mg Intravenous Given 4/6/21 1911)       Diagnostic Studies  Results Reviewed     Procedure Component Value Units Date/Time    Iron Saturation % [[id number]] Collected: 04/06/21 1732    Lab Status: In process Specimen: Blood from Arm, Right Updated: 04/06/21 2029    Ferritin [[id number]] Collected: 04/06/21 1732    Lab Status: In process Specimen: Blood from Arm, Right Updated: 04/06/21 2029    Hemoglobin and hematocrit, blood [[id number]]     Lab Status: No result Specimen: Blood     Hemoglobin and hematocrit, blood [[id number]]     Lab Status: No result Specimen: Blood     TSH [[id number]]  (Normal) Collected: 04/06/21 1732    Lab Status: Final result Specimen: Blood from Arm, Right Updated: 04/06/21 1813     TSH 3RD GENERATON 2 372 uIU/mL     Narrative:      Patients undergoing fluorescein dye angiography may retain small amounts of fluorescein in the body for 48-72 hours post procedure  Samples containing fluorescein can produce falsely depressed TSH values  If the patient had this procedure,a specimen should be resubmitted post fluorescein clearance        Troponin I [[id number]]  (Normal) Collected: 04/06/21 1732    Lab Status: Final result Specimen: Blood from Arm, Right Updated: 04/06/21 1800     Troponin I <0 03 ng/mL     Comprehensive metabolic panel [[id number]]  (Abnormal) Collected: 04/06/21 1732    Lab Status: Final result Specimen: Blood from Arm, Right Updated: 04/06/21 1757     Sodium 142 mmol/L      Potassium 3 8 mmol/L      Chloride 107 mmol/L      CO2 25 mmol/L      ANION GAP 10 mmol/L      BUN 20 mg/dL      Creatinine 1 23 mg/dL      Glucose 120 mg/dL      Calcium 9 4 mg/dL      AST 17 U/L      ALT 15 U/L      Alkaline Phosphatase 40 5 U/L      Total Protein 6 4 g/dL      Albumin 4 0 g/dL      Total Bilirubin 0 36 mg/dL eGFR 41 ml/min/1 73sq m     Narrative:      [place] guidelines for Chronic Kidney Disease (CKD):     Stage 1 with normal or high GFR (GFR > 90 mL/min/1 73 square meters)    Stage 2 Mild CKD (GFR = 60-89 mL/min/1 73 square meters)    Stage 3A Moderate CKD (GFR = 45-59 mL/min/1 73 square meters)    Stage 3B Moderate CKD (GFR = 30-44 mL/min/1 73 square meters)    Stage 4 Severe CKD (GFR = 15-29 mL/min/1 73 square meters)    Stage 5 End Stage CKD (GFR <15 mL/min/1 73 square meters)  Note: GFR calculation is accurate only with a steady state creatinine    Magnesium [[id number]]  (Normal) Collected: 04/06/21 1732    Lab Status: Final result Specimen: Blood from Arm, Right Updated: 04/06/21 1757     Magnesium 2 0 mg/dL     CBC and differential [[id number]]  (Abnormal) Collected: 04/06/21 1732    Lab Status: Final result Specimen: Blood from Arm, Right Updated: 04/06/21 1742     WBC 6 60 Thousand/uL      RBC 2 72 Million/uL      Hemoglobin 8 0 g/dL      Hematocrit 25 5 %      MCV 94 fL      MCH 29 4 pg      MCHC 31 4 g/dL      RDW 14 7 %      MPV 10 4 fL      Platelets 063 Thousands/uL      Neutrophils Relative 60 %      Immat GRANS % 0 %      Lymphocytes Relative 30 %      Monocytes Relative 8 %      Eosinophils Relative 2 %      Basophils Relative 0 %      Neutrophils Absolute 3 97 Thousands/µL      Immature Grans Absolute 0 01 Thousand/uL      Lymphocytes Absolute 1 95 Thousands/µL      Monocytes Absolute 0 52 Thousand/µL      Eosinophils Absolute 0 13 Thousand/µL      Basophils Absolute 0 02 Thousands/µL                  No orders to display              Procedures  ECG 12 Lead Documentation Only    Date/Time: 4/6/2021 5:35 PM  Performed by: Frank Akbar PA-C  Authorized by: Frank Akbar PA-C     Indications / Diagnosis:  Dizziness  ECG reviewed by me, the ED Provider: yes    Patient location:  ED  Previous ECG:     Previous ECG:  Compared to current    Similarity:  No change  Interpretation:     Interpretation: normal    Rate:     ECG rate:  92    ECG rate assessment: normal    Rhythm:     Rhythm: sinus rhythm    Ectopy:     Ectopy: none    QRS:     QRS axis:  Left    QRS intervals:  Normal  Conduction:     Conduction: normal    ST segments:     ST segments:  Normal  T waves:     T waves: normal               ED Course  ED Course as of Apr 06 2034 Tue Apr 06, 2021   1758 Was 10 9 11 days ago and >14 1 month ago  Hemoglobin(!): 8 0   1759 About baseline   Creatinine(!): 1 23   1800 Spoke with night resident regarding admission      1830 Requests I speak with GI      1844 Spoke with Dr Arsalan Mccain, GI, recommends admit for scope tomorrow  BID PTI, Q8 CBC                                              MDM  Number of Diagnoses or Management Options  Diagnosis management comments: Patient was fecal occult positive on 03/26  Patient has not seen GI  She denies any abdominal pain, heartburn, indigestion  She is likely having upper GI bleed given her dark tarry stools  Her blood work was otherwise unremarkable including negative troponin  Her EKG does not show any acute changes         Amount and/or Complexity of Data Reviewed  Clinical lab tests: reviewed        Disposition  Final diagnoses:   UGIB (upper gastrointestinal bleed)   Anemia     Time reflects when diagnosis was documented in both MDM as applicable and the Disposition within this note     Time User Action Codes Description Comment    4/6/2021  6:47 PM Kaye Raman Add [K92 2] Gastrointestinal hemorrhage, unspecified gastrointestinal hemorrhage type     4/6/2021  6:51 PM Austin Perales Add [K92 2] UGIB (upper gastrointestinal bleed)     4/6/2021  6:51 PM Austin Perales Add [D64 9] Anemia       ED Disposition     ED Disposition Condition Date/Time Comment    Admit Stable Tue Apr 6, 2021  6:51 PM Case was discussed with Dr Monse Corbett and the patient's admission status was agreed to be Admission Status: inpatient status to the service of Dr Chelsy Guerra   Follow-up Information    None         Current Discharge Medication List      CONTINUE these medications which have NOT CHANGED    Details   amLODIPine (NORVASC) 2 5 mg tablet Take 1 tablet (2 5 mg total) by mouth daily  Qty: 30 tablet, Refills: 5    Associated Diagnoses: Essential hypertension      aspirin (ASPIRIN 81) 81 mg chewable tablet Chew 81 mg      atorvastatin (LIPITOR) 20 mg tablet Take 1 tablet (20 mg total) by mouth daily  Qty: 90 tablet, Refills: 1    Associated Diagnoses: Hyperlipidemia, unspecified hyperlipidemia type      b complex-vitamin C-folic acid (NEPHROCAPS) 1 mg capsule Take 1 capsule by mouth      Calcium 600-200 MG-UNIT per tablet Take by mouth      lisinopril (ZESTRIL) 20 mg tablet TAKE 1 TABLET BY MOUTH EVERY DAY  Qty: 90 tablet, Refills: 1    Comments: DX Code Needed    Associated Diagnoses: Essential hypertension      meclizine (ANTIVERT) 25 mg tablet Take 1 tablet (25 mg total) by mouth 3 (three) times a day as needed for dizziness  Qty: 30 tablet, Refills: 0    Associated Diagnoses: Dizziness      Omega-3 Fatty Acids (FISH OIL) 1200 MG CAPS Take 1,200 mg by mouth      sertraline (ZOLOFT) 25 mg tablet TAKE 1 TABLET BY MOUTH EVERY DAY IN THE MORNING  Qty: 90 tablet, Refills: 1    Comments: DX Code Needed    Associated Diagnoses: Depression, unspecified depression type           No discharge procedures on file      PDMP Review       Value Time User    PDMP Reviewed  Yes 8/31/2020  3:36 PM Delbert Ward MD          ED Provider  Electronically Signed by           Cony Ro PA-C  [facility]21 [facility]  04/06/21 2037

## 2021-04-06 NOTE — ED TRIAGE NOTES
Pt here for dizziness that she has had for over 10 years  Pt saw PCP today for dizziness still, states she has been rx medications in the past but no relief  Pt was sent here for lab work according to her

## 2021-04-06 NOTE — PATIENT INSTRUCTIONS
Proceed directly to the emergency room for further care  Dizziness   WHAT YOU NEED TO KNOW:   Dizziness is a feeling of being off balance or unsteady  Common causes of dizziness are an inner ear fluid imbalance or a lack of oxygen in your blood  Dizziness may be acute (lasts 3 days or less) or chronic (lasts longer than 3 days)  You may have dizzy spells that last from seconds to a few hours  DISCHARGE INSTRUCTIONS:   Return to the emergency department if:   · You have a headache and a stiff neck  · You have shaking chills and a fever  · You vomit over and over with no relief  · Your vomit or bowel movements are red or black  · You have pain in your chest, back, or abdomen  · You have numbness, especially in your face, arms, or legs  · You have trouble moving your arms or legs  · You are confused  Contact your healthcare provider if:   · You have a fever  · Your symptoms do not get better with treatment  · You have questions or concerns about your condition or care  Manage your symptoms:   · Do not drive  or operate heavy machinery when you are dizzy  · Get up slowly  from sitting or lying down  · Drink plenty of liquids  Liquids help prevent dehydration  Ask how much liquid to drink each day and which liquids are best for you  Follow up with your healthcare provider as directed:  Write down your questions so you remember to ask them during your visits  © Copyright [address] Information is for End User's use only and may not be sold, redistributed or otherwise used for commercial purposes  All illustrations and images included in CareNotes® are the copyrighted property of A D A M , Inc  or [facility] Jun Brunson   The above information is an  only  It is not intended as medical advice for individual conditions or treatments   Talk to your doctor, nurse or pharmacist before following any medical regimen to see if it is safe and effective for you  Melena   WHAT YOU NEED TO KNOW:   Melena is blood in your bowel movements  This is caused by bleeding in your upper gastrointestinal (GI) system or large bowel  Your bowel movements may be black or tarry, and have a foul odor  They may also be shiny or sticky  DISCHARGE INSTRUCTIONS:   Call 911 for any of the following:   · You have signs of shock from blood loss, such as the following:     ? Feeling dizzy or faint, or breathing faster than usual    ? Pale, cool, clammy skin    ? A fast pulse, large pupils, or feeling anxious or agitated    ? Nausea or weakness      Return to the emergency department if:   · You continue to see blood in your bowel movements after treatment  · You have severe pain in your abdomen  Contact your healthcare provider if:   · You have new or worsening symptoms  · You have questions or concerns about your condition or care  Medicines: You may need any of the following:  · Medicine  may be given to reduce the amount of acid your stomach produces  This may help if your melena is caused by an ulcer  You may also need medicine to prevent blood flow to an injury or tear  · Take your medicine as directed  Contact your healthcare provider if you think your medicine is not helping or if you have side effects  Tell him of her if you are allergic to any medicine  Keep a list of the medicines, vitamins, and herbs you take  Include the amounts, and when and why you take them  Bring the list or the pill bottles to follow-up visits  Carry your medicine list with you in case of an emergency  Manage or prevent melena:   · Do not take NSAIDs or aspirin  These medicines can cause gastrointestinal bleeding  Talk to your healthcare provider about other pain medicines that are safe for you to take  · Do not smoke  Nicotine can damage blood vessels  Talk to your healthcare provider if you need help quitting  E-cigarettes or smokeless tobacco still contain nicotine   Ask your healthcare provider for information before you use these products  · Do not drink alcohol or caffeine  Alcohol and caffeine can irritate your stomach  The lining of your stomach or intestine may also be damaged  Talk to your healthcare provider if you need help to quit drinking alcohol  · Eat a variety of healthy foods  Healthy foods include fruits, vegetables, low-fat dairy products, lean meats, fish, and legumes such as lentils  Healthy foods can help you heal and improve your energy  · Drink extra liquids as directed  Ask your healthcare provider how much liquid to drink each day and which liquids are best for you  Follow up with your healthcare provider as directed:  Write down your questions so you remember to ask them during your visits  © Copyright [address] Information is for End User's use only and may not be sold, redistributed or otherwise used for commercial purposes  All illustrations and images included in CareNotes® are the copyrighted property of A D A M , Inc  or [address] [place], [place] [address]cohn Brunson   The above information is an  only  It is not intended as medical advice for individual conditions or treatments  Talk to your doctor, nurse or pharmacist before following any medical regimen to see if it is safe and effective for you

## 2021-04-06 NOTE — PROGRESS NOTES
[facility]        NAME: Luis Delarosa is a 82 y o  female  : 1935    MRN: [id number]  DATE: 2021  TIME: 5:09 PM    Assessment and Plan   Dizzinesses [R42]  1  Dizzinesses  ECG 12 lead    Transfer to other facility   2  Complaint of melena  Transfer to other facility         Patient Instructions     Proceed directly to the emergency room for further care  Concern for continued drop in hemoglobin, acute GI bleed  EMS transfer refused  Pt wishes to bring herself to the ER  AMA form signed  Follow up with PCP in 3-5 days  Proceed to  ER if symptoms worsen  Chief Complaint     Chief Complaint   Patient presents with    Dizziness     ongoing x 10 years, pt  states that she was in the hospital a few weeks ago for sx         History of Present Illness       82year old female presents to the office for c/o of dizziness that has been present for her a number of years  She estimates that this has been going on for her 10+ years  She is here in the office today however because the dizziness has been progressive for her and is now at the worst that it has been  She also reports melena x 1 week  The patient was recently seen at the ER for her complaints  She was seen at [address] [place] [address] on 3/26/2021  There she a work up  She was wound to have positive heme stools  She also had CBC done which showed drop in HB from 14 3 on 3/4 to 10 9 on 3/16  She continued to report dark stools  She states that she stool is "mushy" which is abnormal for her  She describes her dizziness at a lightheadedness that is only present with standing and walking  Today while walking she notes that she felt more fatigued and that her legs felt "weak" bilaterally  She was recently seen by her PCP Dr Javy Jones on 3/30 who suggested that she stop the Meclizine that she was given in the ER as it was not helping with her symptoms  She was also given a referral to GI  She was supposed to have f/u appt   With GI tomorrow, but states that she just canceled it as she had a worsening of her symptoms  She denies any CP, SOB, palpitations  She denies any neuro deficits, unilateral weakness, facial asymmetry, visual changes  She denies any recent falls, head injury, syncope  Dizziness  This is a new problem  The current episode started more than 1 year ago  The problem occurs daily  The problem has been gradually worsening  Associated symptoms include fatigue (at times ), a sore throat and weakness (legs roxanna  )  Pertinent negatives include no abdominal pain, chest pain, chills, congestion, coughing, diaphoresis, fever, headaches, nausea or vomiting  The symptoms are aggravated by standing and walking (walking, standing  )  Treatments tried: Meclizine  Review of Systems   Review of Systems   Constitutional: Positive for fatigue (at times )  Negative for chills, diaphoresis and fever  HENT: Positive for rhinorrhea and sore throat  Negative for congestion and postnasal drip  Tinnitus: tickle in the throat     Eyes: Negative for photophobia  Respiratory: Negative for cough and shortness of breath  Cardiovascular: Negative for chest pain and palpitations  Gastrointestinal: Negative for abdominal distention, abdominal pain, blood in stool, nausea, rectal pain and vomiting  Genitourinary: Negative for difficulty urinating and hematuria  Neurological: Positive for dizziness, weakness (legs roxanna  ) and light-headedness  Negative for facial asymmetry and headaches  Psychiatric/Behavioral: Negative for confusion           Current Medications       Current Outpatient Medications:     amLODIPine (NORVASC) 2 5 mg tablet, Take 1 tablet (2 5 mg total) by mouth daily, Disp: 30 tablet, Rfl: 5    aspirin (ASPIRIN 81) 81 mg chewable tablet, Chew 81 mg, Disp: , Rfl:     atorvastatin (LIPITOR) 20 mg tablet, Take 1 tablet (20 mg total) by mouth daily, Disp: 90 tablet, Rfl: 1    b complex-vitamin C-folic acid (NEPHROCAPS) 1 mg capsule, Take 1 capsule by mouth, Disp: , Rfl:     Calcium 600-200 MG-UNIT per tablet, Take by mouth, Disp: , Rfl:     lisinopril (ZESTRIL) 20 mg tablet, TAKE 1 TABLET BY MOUTH EVERY DAY, Disp: 90 tablet, Rfl: 1    Omega-3 Fatty Acids (FISH OIL) 1200 MG CAPS, Take 1,200 mg by mouth, Disp: , Rfl:     sertraline (ZOLOFT) 25 mg tablet, TAKE 1 TABLET BY MOUTH EVERY DAY IN THE MORNING, Disp: 90 tablet, Rfl: 1    meclizine (ANTIVERT) 25 mg tablet, Take 1 tablet (25 mg total) by mouth 3 (three) times a day as needed for dizziness (Patient not taking: Reported on 4/6/2021), Disp: 30 tablet, Rfl: 0    Current Allergies     Allergies as of 04/06/2021 - Reviewed 04/06/2021   Allergen Reaction Noted    Amoxicillin Diarrhea 05/19/2020            The following portions of the patient's history were reviewed and updated as appropriate: allergies, current medications, past family history, past medical history, past social history, past surgical history and problem list      Past Medical History:   Diagnosis Date    Aortic valve stenosis     Depression     H/O echocardiogram 08/2019    High cholesterol     Hyperlipidemia     Hypertension     Hypothyroidism     Insomnia     Mitral valve regurgitation        Past Surgical History:   Procedure Laterality Date    CARDIAC ELECTROPHYSIOLOGY PROCEDURE  09/2019    CATARACT EXTRACTION, BILATERAL  2016    COLONOSCOPY  2019    fecal implant    EYE SURGERY      KNEE CARTILAGE SURGERY      KNEE SURGERY Left     menuscus torn/repaired    MAMMO (HISTORICAL)  2017    [facility]       Family History   Problem Relation Age of Onset    Sudden death Mother         cardiac    Hypertension Mother     Stroke Father     Hypertension Sister     Breast cancer Sister          Medications have been verified          Objective   /63   Pulse 88   Temp 98 8 °F (37 1 °C)   Resp 20   Ht 5' 2" (1 575 m) Comment: pt reported  Wt 76 2 kg (168 lb) Comment: pt reported  SpO2 98%   BMI 30 73 kg/m² No LMP recorded  Patient is postmenopausal        Physical Exam     Physical Exam  Vitals signs and nursing note reviewed  Constitutional:       General: She is not in acute distress  Appearance: She is well-developed  She is not ill-appearing or diaphoretic  HENT:      Head: Normocephalic and atraumatic  Right Ear: Hearing, tympanic membrane, ear canal and external ear normal       Left Ear: Hearing, tympanic membrane, ear canal and external ear normal       Nose: Nose normal  No mucosal edema or rhinorrhea  Mouth/Throat:      Pharynx: Uvula midline  No oropharyngeal exudate, posterior oropharyngeal erythema or uvula swelling  Eyes:      General: Lids are normal       Conjunctiva/sclera: Conjunctivae normal       Pupils: Pupils are equal, round, and reactive to light  Neck:      Musculoskeletal: Full passive range of motion without pain and neck supple  Vascular: No carotid bruit  Cardiovascular:      Rate and Rhythm: Normal rate and regular rhythm  No extrasystoles are present  Pulses: Normal pulses  Heart sounds: S1 normal and S2 normal  Murmur present  Systolic murmur present  Pulmonary:      Effort: Pulmonary effort is normal  No respiratory distress  Breath sounds: Normal breath sounds  No stridor  No decreased breath sounds, wheezing or rales  Abdominal:      General: Bowel sounds are normal       Palpations: Abdomen is soft  Tenderness: There is no abdominal tenderness  There is no right CVA tenderness, left CVA tenderness, guarding or rebound  Lymphadenopathy:      Cervical: No cervical adenopathy  Right cervical: No superficial cervical adenopathy  Left cervical: No superficial cervical adenopathy  Skin:     General: Skin is warm and dry  Findings: No rash  Neurological:      General: No focal deficit present  Mental Status: She is alert and oriented to person, place, and time  Cranial Nerves: Cranial nerves are intact  Sensory: Sensation is intact  Motor: Motor function is intact  No weakness, seizure activity or pronator drift  Coordination: Coordination is intact  Heel to [place] esther Test normal       Gait: Gait is intact  Comments: cranial nerves 2 through 12 grossly intact  Sensation intact to  Crude touch in bilateral upper and lower extremities  Strength 5/5 in bilateral upper lower extremities  Coordination within normal limits  Psychiatric:         Behavior: Behavior is cooperative  EKG:  Normal sinus rhythm 1 beats per minute significant ST segment elevations or depressions compared to previous done on 03/26/2021  No significant changes

## 2021-04-07 ENCOUNTER — APPOINTMENT (INPATIENT)
Dept: GASTROENTEROLOGY | Facility: HOSPITAL | Age: 86
DRG: 378 | End: 2021-04-07
Payer: COMMERCIAL

## 2021-04-07 ENCOUNTER — ANESTHESIA EVENT (INPATIENT)
Dept: GASTROENTEROLOGY | Facility: HOSPITAL | Age: 86
DRG: 378 | End: 2021-04-07
Payer: COMMERCIAL

## 2021-04-07 ENCOUNTER — ANESTHESIA (INPATIENT)
Dept: GASTROENTEROLOGY | Facility: HOSPITAL | Age: 86
DRG: 378 | End: 2021-04-07
Payer: COMMERCIAL

## 2021-04-07 PROBLEM — I35.0 AORTIC STENOSIS: Status: ACTIVE | Noted: 2021-04-07

## 2021-04-07 LAB
ABO GROUP BLD BPU: NORMAL
ABO GROUP BLD: NORMAL
ANION GAP SERPL CALCULATED.3IONS-SCNC: 7 MMOL/L (ref 4–13)
ATRIAL RATE: 81 BPM
BASOPHILS # BLD AUTO: 0.02 THOUSANDS/ΜL (ref 0–0.1)
BASOPHILS NFR BLD AUTO: 0 % (ref 0–1)
BPU ID: NORMAL
BUN SERPL-MCNC: 17 MG/DL (ref 6–20)
CALCIUM SERPL-MCNC: 8.7 MG/DL (ref 8.4–10.2)
CHLORIDE SERPL-SCNC: 109 MMOL/L (ref 96–108)
CO2 SERPL-SCNC: 29 MMOL/L (ref 22–33)
CREAT SERPL-MCNC: 1.22 MG/DL (ref 0.4–1.1)
CROSSMATCH: NORMAL
EOSINOPHIL # BLD AUTO: 0.13 THOUSAND/ΜL (ref 0–0.61)
EOSINOPHIL NFR BLD AUTO: 3 % (ref 0–6)
ERYTHROCYTE [DISTWIDTH] IN BLOOD BY AUTOMATED COUNT: 14.7 % (ref 11.6–15.1)
FERRITIN SERPL-MCNC: 15 NG/ML (ref 8–388)
GFR SERPL CREATININE-BSD FRML MDRD: 41 ML/MIN/1.73SQ M
GLUCOSE SERPL-MCNC: 88 MG/DL (ref 65–140)
HCT VFR BLD AUTO: 25.6 % (ref 34.8–46.1)
HCT VFR BLD AUTO: 26.7 % (ref 34.8–46.1)
HCT VFR BLD AUTO: 27.1 % (ref 34.8–46.1)
HGB BLD-MCNC: 8.1 G/DL (ref 11.5–15.4)
HGB BLD-MCNC: 8.4 G/DL (ref 11.5–15.4)
HGB BLD-MCNC: 8.6 G/DL (ref 11.5–15.4)
IMM GRANULOCYTES # BLD AUTO: 0.01 THOUSAND/UL (ref 0–0.2)
IMM GRANULOCYTES NFR BLD AUTO: 0 % (ref 0–2)
INR PPP: 0.96 (ref 0.9–1.1)
IRON SATN MFR SERPL: 17 %
IRON SERPL-MCNC: 56 UG/DL (ref 50–170)
LYMPHOCYTES # BLD AUTO: 1.9 THOUSANDS/ΜL (ref 0.6–4.47)
LYMPHOCYTES NFR BLD AUTO: 36 % (ref 14–44)
MAGNESIUM SERPL-MCNC: 2 MG/DL (ref 1.6–2.6)
MCH RBC QN AUTO: 29.8 PG (ref 26.8–34.3)
MCHC RBC AUTO-ENTMCNC: 31.5 G/DL (ref 31.4–37.4)
MCV RBC AUTO: 95 FL (ref 82–98)
MONOCYTES # BLD AUTO: 0.47 THOUSAND/ΜL (ref 0.17–1.22)
MONOCYTES NFR BLD AUTO: 9 % (ref 4–12)
NEUTROPHILS # BLD AUTO: 2.71 THOUSANDS/ΜL (ref 1.85–7.62)
NEUTS SEG NFR BLD AUTO: 52 % (ref 43–75)
P AXIS: 68 DEGREES
PHOSPHATE SERPL-MCNC: 3.7 MG/DL (ref 2.5–5)
PLATELET # BLD AUTO: 191 THOUSANDS/UL (ref 149–390)
PMV BLD AUTO: 11 FL (ref 8.9–12.7)
POTASSIUM SERPL-SCNC: 3.6 MMOL/L (ref 3.5–5)
PR INTERVAL: 148 MS
PROTHROMBIN TIME: 10.9 SECONDS (ref 9.5–12.1)
QRS AXIS: -27 DEGREES
QRSD INTERVAL: 78 MS
QT INTERVAL: 374 MS
QTC INTERVAL: 434 MS
RBC # BLD AUTO: 2.82 MILLION/UL (ref 3.81–5.12)
RH BLD: POSITIVE
SODIUM SERPL-SCNC: 145 MMOL/L (ref 133–145)
T WAVE AXIS: 58 DEGREES
TIBC SERPL-MCNC: 335 UG/DL (ref 250–450)
UNIT DISPENSE STATUS: NORMAL
UNIT PRODUCT CODE: NORMAL
UNIT RH: NORMAL
VENTRICULAR RATE: 81 BPM
WBC # BLD AUTO: 5.24 THOUSAND/UL (ref 4.31–10.16)

## 2021-04-07 PROCEDURE — 99221 1ST HOSP IP/OBS SF/LOW 40: CPT | Performed by: PHYSICIAN ASSISTANT

## 2021-04-07 PROCEDURE — 88342 IMHCHEM/IMCYTCHM 1ST ANTB: CPT | Performed by: PATHOLOGY

## 2021-04-07 PROCEDURE — 85014 HEMATOCRIT: CPT | Performed by: INTERNAL MEDICINE

## 2021-04-07 PROCEDURE — 85025 COMPLETE CBC W/AUTO DIFF WBC: CPT | Performed by: INTERNAL MEDICINE

## 2021-04-07 PROCEDURE — 43255 EGD CONTROL BLEEDING ANY: CPT | Performed by: INTERNAL MEDICINE

## 2021-04-07 PROCEDURE — 97163 PT EVAL HIGH COMPLEX 45 MIN: CPT

## 2021-04-07 PROCEDURE — 85018 HEMOGLOBIN: CPT | Performed by: INTERNAL MEDICINE

## 2021-04-07 PROCEDURE — 0W3P8ZZ CONTROL BLEEDING IN GASTROINTESTINAL TRACT, VIA NATURAL OR ARTIFICIAL OPENING ENDOSCOPIC: ICD-10-PCS | Performed by: INTERNAL MEDICINE

## 2021-04-07 PROCEDURE — 0DB78ZX EXCISION OF STOMACH, PYLORUS, VIA NATURAL OR ARTIFICIAL OPENING ENDOSCOPIC, DIAGNOSTIC: ICD-10-PCS | Performed by: INTERNAL MEDICINE

## 2021-04-07 PROCEDURE — 84100 ASSAY OF PHOSPHORUS: CPT | Performed by: INTERNAL MEDICINE

## 2021-04-07 PROCEDURE — P9016 RBC LEUKOCYTES REDUCED: HCPCS

## 2021-04-07 PROCEDURE — C9113 INJ PANTOPRAZOLE SODIUM, VIA: HCPCS | Performed by: INTERNAL MEDICINE

## 2021-04-07 PROCEDURE — 88305 TISSUE EXAM BY PATHOLOGIST: CPT | Performed by: PATHOLOGY

## 2021-04-07 PROCEDURE — 93010 ELECTROCARDIOGRAM REPORT: CPT | Performed by: INTERNAL MEDICINE

## 2021-04-07 PROCEDURE — 43239 EGD BIOPSY SINGLE/MULTIPLE: CPT | Performed by: INTERNAL MEDICINE

## 2021-04-07 PROCEDURE — 80048 BASIC METABOLIC PNL TOTAL CA: CPT | Performed by: INTERNAL MEDICINE

## 2021-04-07 PROCEDURE — 83735 ASSAY OF MAGNESIUM: CPT | Performed by: INTERNAL MEDICINE

## 2021-04-07 RX ORDER — PROPOFOL 10 MG/ML
INJECTION, EMULSION INTRAVENOUS AS NEEDED
Status: DISCONTINUED | OUTPATIENT
Start: 2021-04-07 | End: 2021-04-07

## 2021-04-07 RX ORDER — SUCRALFATE 1 G/1
1 TABLET ORAL
Status: DISCONTINUED | OUTPATIENT
Start: 2021-04-07 | End: 2021-04-09 | Stop reason: HOSPADM

## 2021-04-07 RX ORDER — SODIUM CHLORIDE, SODIUM LACTATE, POTASSIUM CHLORIDE, CALCIUM CHLORIDE 600; 310; 30; 20 MG/100ML; MG/100ML; MG/100ML; MG/100ML
INJECTION, SOLUTION INTRAVENOUS CONTINUOUS PRN
Status: DISCONTINUED | OUTPATIENT
Start: 2021-04-07 | End: 2021-04-07

## 2021-04-07 RX ORDER — PANTOPRAZOLE SODIUM 40 MG/1
40 TABLET, DELAYED RELEASE ORAL
Status: DISCONTINUED | OUTPATIENT
Start: 2021-04-07 | End: 2021-04-09 | Stop reason: HOSPADM

## 2021-04-07 RX ORDER — LIDOCAINE HYDROCHLORIDE 10 MG/ML
INJECTION, SOLUTION EPIDURAL; INFILTRATION; INTRACAUDAL; PERINEURAL AS NEEDED
Status: DISCONTINUED | OUTPATIENT
Start: 2021-04-07 | End: 2021-04-07

## 2021-04-07 RX ADMIN — ACETAMINOPHEN 650 MG: 325 TABLET, FILM COATED ORAL at 16:39

## 2021-04-07 RX ADMIN — PROPOFOL 30 MG: 10 INJECTION, EMULSION INTRAVENOUS at 14:27

## 2021-04-07 RX ADMIN — SUCRALFATE 1 G: 1 TABLET ORAL at 22:43

## 2021-04-07 RX ADMIN — SODIUM CHLORIDE 8 MG/HR: 9 INJECTION, SOLUTION INTRAVENOUS at 01:33

## 2021-04-07 RX ADMIN — SODIUM CHLORIDE, SODIUM LACTATE, POTASSIUM CHLORIDE, AND CALCIUM CHLORIDE: .6; .31; .03; .02 INJECTION, SOLUTION INTRAVENOUS at 14:20

## 2021-04-07 RX ADMIN — PROPOFOL 30 MG: 10 INJECTION, EMULSION INTRAVENOUS at 14:29

## 2021-04-07 RX ADMIN — PROPOFOL 20 MG: 10 INJECTION, EMULSION INTRAVENOUS at 14:33

## 2021-04-07 RX ADMIN — PROPOFOL 120 MG: 10 INJECTION, EMULSION INTRAVENOUS at 14:26

## 2021-04-07 RX ADMIN — PROPOFOL 20 MG: 10 INJECTION, EMULSION INTRAVENOUS at 14:36

## 2021-04-07 RX ADMIN — PROPOFOL 20 MG: 10 INJECTION, EMULSION INTRAVENOUS at 14:42

## 2021-04-07 RX ADMIN — SERTRALINE HYDROCHLORIDE 25 MG: 25 TABLET ORAL at 09:11

## 2021-04-07 RX ADMIN — GLUCAGON HYDROCHLORIDE 1 MG: KIT at 14:43

## 2021-04-07 RX ADMIN — PROPOFOL 20 MG: 10 INJECTION, EMULSION INTRAVENOUS at 14:31

## 2021-04-07 RX ADMIN — Medication 1 CAPSULE: at 15:56

## 2021-04-07 RX ADMIN — PANTOPRAZOLE SODIUM 40 MG: 40 TABLET, DELAYED RELEASE ORAL at 16:46

## 2021-04-07 RX ADMIN — SODIUM CHLORIDE 8 MG/HR: 9 INJECTION, SOLUTION INTRAVENOUS at 15:55

## 2021-04-07 RX ADMIN — AMLODIPINE BESYLATE 2.5 MG: 2.5 TABLET ORAL at 09:11

## 2021-04-07 RX ADMIN — LIDOCAINE HYDROCHLORIDE 50 MG: 10 INJECTION, SOLUTION EPIDURAL; INFILTRATION; INTRACAUDAL; PERINEURAL at 14:26

## 2021-04-07 RX ADMIN — PROPOFOL 20 MG: 10 INJECTION, EMULSION INTRAVENOUS at 14:39

## 2021-04-07 RX ADMIN — ATORVASTATIN CALCIUM 20 MG: 20 TABLET, FILM COATED ORAL at 15:56

## 2021-04-07 RX ADMIN — ACETAMINOPHEN 650 MG: 325 TABLET, FILM COATED ORAL at 09:20

## 2021-04-07 RX ADMIN — SODIUM CHLORIDE 80 MG: 9 INJECTION, SOLUTION INTRAVENOUS at 00:48

## 2021-04-07 RX ADMIN — ACETAMINOPHEN 650 MG: 325 TABLET, FILM COATED ORAL at 00:57

## 2021-04-07 RX ADMIN — SODIUM CHLORIDE, SODIUM GLUCONATE, SODIUM ACETATE, POTASSIUM CHLORIDE, MAGNESIUM CHLORIDE, SODIUM PHOSPHATE, DIBASIC, AND POTASSIUM PHOSPHATE 75 ML/HR: .53; .5; .37; .037; .03; .012; .00082 INJECTION, SOLUTION INTRAVENOUS at 15:56

## 2021-04-07 RX ADMIN — SUCRALFATE 1 G: 1 TABLET ORAL at 16:46

## 2021-04-07 NOTE — H&P (VIEW-ONLY)
Consultation -[facility]   Rosie Chambers 82 y o  female MRN: [id number]    Unit/Bed#: -01 Encounter: [id number]      Physician Requesting Consult: Dr Gila Dickerson    Reason for Consult / Principal Problem: Gi bleed     HPI:This is an 82 y o  female with past medical history of hypertension, hyperlipidemia, aortic stenosis, chronic dizziness and urinary incontinence who presents with worsening dizziness  She was sent to the ED from urgent care center as her hemoglobin dropped from 10 9 to 8 g per dL  Patient has been having dizziness for more than 10 years which is progressively getting worse since the beginning of this year  She reports her dizziness is more like lightheadedness and aggravated by standing up and progressively getting worse by movement  It is better with rest and sitting still  She denies any nausea, vomiting, fever, chills, recent illness  She visited ED 2 weeks ago due to worsening dizziness  At that time her hemoglobin was 10 9 and FOBT positive  She was advised to follow-up with primary care physician and discharged home with meclizine  She reports GI appointment will be tomorrow  She also reports of multiple chronic problems along with dizziness such as loss of taste, loss of smell, ear fullness, bilateral hearing impairment and urinary incontinence  She reports MRI of the brain and carotid duplex ultrasound was done recently which were reported negative  Patient also reports of dark color stool for more than 1 week  She reports her stool consistency has been changed which is more like mushy  She has bilateral knee arthritis  She reports she is not taking NSAID regularly  She took 2 Advils yesterday on empty stomach  She drinks a glass of wine every night  She takes aspirin 81 mg daily  Not on blood thinning medication  Denies any obvious bleeding or bruising  Denies any nausea, vomiting, abdominal pain    She reports she has heart murmur since childhood  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  Colonoscopy was done at that time which was normal except polyps  Patient states that she does not have any significant reflux symptoms or difficulty swallowing or nausea or vomiting  States that her stools have been very dark brown  She does admit to shortness of breath  Denies any abdominal pain or history of an EGD in the past or history of peptic ulcer disease  Hemoglobin was 7 3 on admission and she got 1 unit PRBCs  and now it is 8 4        Allergies:    Allergies   Allergen Reactions    Amoxicillin Diarrhea       Medications:  Current Facility-Administered Medications:     acetaminophen (TYLENOL) tablet 650 mg, 650 mg, Oral, Q6H PRN, Anant Lama MD, 650 mg at 04/07/21 0057    amLODIPine (NORVASC) tablet 2 5 mg, 2 5 mg, Oral, Daily, Anant Lama MD    atorvastatin (LIPITOR) tablet 20 mg, 20 mg, Oral, Daily With Brice Centeno MD    b complex-vitamin C-folic acid (NEPHROCAPS) capsule 1 capsule, 1 capsule, Oral, Daily With Dinner, Anant Lama MD    multi-electrolyte (PLASMALYTE-A/ISOLYTE-S PH 7 4) IV solution, 75 mL/hr, Intravenous, Continuous, Candy Smith MD, Last Rate: 75 mL/hr at 04/07/21 0405, 75 mL/hr at 04/07/21 0405    ondansetron (ZOFRAN) injection 4 mg, 4 mg, Intravenous, Q6H PRN, Anant Lama MD    [COMPLETED] pantoprazole (PROTONIX) 80 mg in sodium chloride 0 9 % 100 mL IVPB, 80 mg, Intravenous, Once, Last Rate: 200 mL/hr at 04/07/21 0048, 80 mg at 04/07/21 0048 **AND** pantoprazole (PROTONIX) 80 mg in sodium chloride 0 9 % 100 mL infusion, 8 mg/hr, Intravenous, Continuous, Timothy Ruano MD, Last Rate: 10 mL/hr at 04/07/21 0133, 8 mg/hr at 04/07/21 0133    sertraline (ZOLOFT) tablet 25 mg, 25 mg, Oral, QAM, Anant Lama MD    Past Medical history:  Past Medical History:   Diagnosis Date    Aortic valve stenosis     Depression     H/O echocardiogram 08/2019    High cholesterol     Hyperlipidemia  Hypertension     Hypothyroidism     Insomnia     Mitral valve regurgitation        Past Surgical History:   Past Surgical History:   Procedure Laterality Date    CARDIAC ELECTROPHYSIOLOGY PROCEDURE  2019    CATARACT EXTRACTION, BILATERAL  2016    COLONOSCOPY  2019    fecal implant    EYE SURGERY      KNEE CARTILAGE SURGERY      KNEE SURGERY Left     menuscus torn/repaired    MAMMO (HISTORICAL)      [facility]       Social history:   Social History     Socioeconomic History    Marital status:       Spouse name: Not on file    Number of children: Not on file    Years of education: 6    Highest education level: Not on file   Occupational History    Occupation: Retired   Social Needs    Financial resource strain: Not on file    Food insecurity     Worry: Not on file     Inability: Not on file   [employer] needs     Medical: Not on file     Non-medical: Not on file   Tobacco Use    Smoking status: Former Smoker     Packs/day: 1 00     Years: 50 00     Pack years: 50 00     Quit date:      Years since quittin 2    Smokeless tobacco: Never Used   Substance and Sexual Activity    Alcohol use: Yes     Frequency: 4 or more times a week     Drinks per session: 1 or 2    Drug use: Never    Sexual activity: Not on file   Lifestyle    Physical activity     Days per week: Not on file     Minutes per session: Not on file    Stress: Not on file   Relationships    Social connections     Talks on phone: Not on file     Gets together: Not on file     Attends Jewish service: Not on file     Active member of club or organization: Not on file     Attends meetings of clubs or organizations: Not on file     Relationship status: Not on file    Intimate partner violence     Fear of current or ex partner: Not on file     Emotionally abused: Not on file     Physically abused: Not on file     Forced sexual activity: Not on file   Other Topics Concern    Not on file   Social History Narrative Most recent tobacco use screenin-    Do you currently or have you served in the [employer]: No    Were you activated, into active duty, as a member of the [employer] or as a Reservist: No    Occupation: Retired    Education: 6    Marital status:     Exercise level: Occasional    Diet: Regular    General stress level: High    Has smoked since age: 8    Alcohol intake: Moderate    1 glass of wine daily    Caffeine intake:  Moderate    Chewing tobacco: none    Illicit drugs: no    Seat belts used routinely: Yes    Sunscreen used routinely: No    Smoke alarm in home: Yes    Advance directive: Yes    Salt Intake: minimal       Review of Systems: All other systems were reviewed and were negative, otherwise please refer to HPI    Physical Exam: /61 (BP Location: Left arm)   Pulse 69   Temp 98 3 °F (36 8 °C) (Tympanic)   Resp 20   Ht 5' 2" (1 575 m)   SpO2 90%   BMI 30 73 kg/m²     General Appearance:    Alert, cooperative, no distress, appears stated age   Head:    Normocephalic, without obvious abnormality, atraumatic   Eyes:    No scleral icterus           Mouth:  Mucosa moist   Neck:   Supple, symmetrical, trachea midline, no thyromegaly       Lungs:     Clear to auscultation bilaterally, respirations unlabored       Heart:    Regular rate and rhythm, S1 and S2 normal, +systolic murmur, rub or gallop     Abdomen:     Soft, non-tender, bowel sounds active all four quadrants,     no masses, no organomegaly   Genitalia:   deferred   Rectal:   deferred   Extremities:   Extremities normal,no cyanosis or edema       Skin:   Skin color, texture, turgor normal, no rashes or lesions       Neurologic:   Grossly intact, no focal deficit           Lab Results:   Recent Results (from the past 24 hour(s))   Fingerstick Glucose (POCT)    Collection Time: 21  4:09 PM   Result Value Ref Range    POC Glucose 100 65 - 140 mg/dl   ECG 12 lead    Collection Time: 21  4:20 PM   Result Value Ref Range    Ventricular Rate 81 BPM    Atrial Rate 81 BPM    HI Interval 148 ms    QRSD Interval 78 ms    QT Interval 374 ms    QTC Interval 434 ms    P Allen 68 degrees    QRS Axis -27 degrees    T Wave Allen 58 degrees   ECG 12 lead    Collection Time: 04/06/21  5:19 PM   Result Value Ref Range    Ventricular Rate 92 BPM    Atrial Rate 90 BPM    HI Interval 149 ms    QRSD Interval 85 ms    QT Interval 359 ms    QTC Interval 445 ms    P Axis 73 degrees    QRS Axis -19 degrees    T Wave Axis 62 degrees   CBC and differential    Collection Time: 04/06/21  5:32 PM   Result Value Ref Range    WBC 6 60 4 31 - 10 16 Thousand/uL    RBC 2 72 (L) 3 81 - 5 12 Million/uL    Hemoglobin 8 0 (L) 11 5 - 15 4 g/dL    Hematocrit 25 5 (L) 34 8 - 46 1 %    MCV 94 82 - 98 fL    MCH 29 4 26 8 - 34 3 pg    MCHC 31 4 31 4 - 37 4 g/dL    RDW 14 7 11 6 - 15 1 %    MPV 10 4 8 9 - 12 7 fL    Platelets 167 521 - 479 Thousands/uL    Neutrophils Relative 60 43 - 75 %    Immat GRANS % 0 0 - 2 %    Lymphocytes Relative 30 14 - 44 %    Monocytes Relative 8 4 - 12 %    Eosinophils Relative 2 0 - 6 %    Basophils Relative 0 0 - 1 %    Neutrophils Absolute 3 97 1 85 - 7 62 Thousands/µL    Immature Grans Absolute 0 01 0 00 - 0 20 Thousand/uL    Lymphocytes Absolute 1 95 0 60 - 4 47 Thousands/µL    Monocytes Absolute 0 52 0 17 - 1 22 Thousand/µL    Eosinophils Absolute 0 13 0 00 - 0 61 Thousand/µL    Basophils Absolute 0 02 0 00 - 0 10 Thousands/µL   Comprehensive metabolic panel    Collection Time: 04/06/21  5:32 PM   Result Value Ref Range    Sodium 142 133 - 145 mmol/L    Potassium 3 8 3 5 - 5 0 mmol/L    Chloride 107 96 - 108 mmol/L    CO2 25 22 - 33 mmol/L    ANION GAP 10 4 - 13 mmol/L    BUN 20 6 - 20 mg/dL    Creatinine 1 23 (H) 0 40 - 1 10 mg/dL    Glucose 120 65 - 140 mg/dL    Calcium 9 4 8 4 - 10 2 mg/dL    AST 17 15 - 41 U/L    ALT 15 5 - 54 U/L    Alkaline Phosphatase 40 5 35 - 140 U/L    Total Protein 6 4 6 4 - 8 3 g/dL    Albumin 4 0 3 4 - 4 8 g/dL    Total Bilirubin 0 36 0 30 - 1 20 mg/dL    eGFR 41 ml/min/1 73sq m   Magnesium    Collection Time: 04/06/21  5:32 PM   Result Value Ref Range    Magnesium 2 0 1 6 - 2 6 mg/dL   TSH    Collection Time: 04/06/21  5:32 PM   Result Value Ref Range    TSH 3RD GENERATON 2 372 0 340 - 5 600 uIU/mL   Type and screen    Collection Time: 04/06/21  5:32 PM   Result Value Ref Range    ABO Grouping B     Rh Factor Positive     Antibody Screen Negative     Specimen Expiration Date [id number]    Troponin I    Collection Time: 04/06/21  5:32 PM   Result Value Ref Range    Troponin I <0 03 0 00 - 0 07 ng/mL   Iron Saturation %    Collection Time: 04/06/21  5:32 PM   Result Value Ref Range    Iron Saturation 17 %    TIBC 335 250 - 450 ug/dL    Iron 56 50 - 170 ug/dL   Ferritin    Collection Time: 04/06/21  5:32 PM   Result Value Ref Range    Ferritin 15 8 - 388 ng/mL   Hemoglobin and hematocrit, blood    Collection Time: 04/06/21 11:22 PM   Result Value Ref Range    Hemoglobin 7 2 (L) 11 5 - 15 4 g/dL    Hematocrit 23 0 (L) 34 8 - 46 1 %   Hemoglobin and hematocrit, blood    Collection Time: 04/06/21 11:22 PM   Result Value Ref Range    Hemoglobin 7 3 (L) 11 5 - 15 4 g/dL    Hematocrit 23 3 (L) 34 8 - 46 1 %   Protime-INR    Collection Time: 04/06/21 11:22 PM   Result Value Ref Range    Protime 10 9 9 5 - 12 1 seconds    INR 0 96 0 90 - 1 10   ABORh Recheck - Contact Blood Bank Prior to Collection    Collection Time: 04/06/21 11:24 PM   Result Value Ref Range    ABO Grouping B     Rh Factor Positive    CBC and differential    Collection Time: 04/07/21  4:45 AM   Result Value Ref Range    WBC 5 24 4 31 - 10 16 Thousand/uL    RBC 2 82 (L) 3 81 - 5 12 Million/uL    Hemoglobin 8 4 (L) 11 5 - 15 4 g/dL    Hematocrit 26 7 (L) 34 8 - 46 1 %    MCV 95 82 - 98 fL    MCH 29 8 26 8 - 34 3 pg    MCHC 31 5 31 4 - 37 4 g/dL    RDW 14 7 11 6 - 15 1 %    MPV 11 0 8 9 - 12 7 fL    Platelets 729 498 - 911 Thousands/uL    Neutrophils Relative 52 43 - 75 %    Immat GRANS % 0 0 - 2 %    Lymphocytes Relative 36 14 - 44 %    Monocytes Relative 9 4 - 12 %    Eosinophils Relative 3 0 - 6 %    Basophils Relative 0 0 - 1 %    Neutrophils Absolute 2 71 1 85 - 7 62 Thousands/µL    Immature Grans Absolute 0 01 0 00 - 0 20 Thousand/uL    Lymphocytes Absolute 1 90 0 60 - 4 47 Thousands/µL    Monocytes Absolute 0 47 0 17 - 1 22 Thousand/µL    Eosinophils Absolute 0 13 0 00 - 0 61 Thousand/µL    Basophils Absolute 0 02 0 00 - 0 10 Thousands/µL   Basic metabolic panel    Collection Time: 04/07/21  4:45 AM   Result Value Ref Range    Sodium 145 133 - 145 mmol/L    Potassium 3 6 3 5 - 5 0 mmol/L    Chloride 109 (H) 96 - 108 mmol/L    CO2 29 22 - 33 mmol/L    ANION GAP 7 4 - 13 mmol/L    BUN 17 6 - 20 mg/dL    Creatinine 1 22 (H) 0 40 - 1 10 mg/dL    Glucose 88 65 - 140 mg/dL    Calcium 8 7 8 4 - 10 2 mg/dL    eGFR 41 ml/min/1 73sq m   Magnesium    Collection Time: 04/07/21  4:45 AM   Result Value Ref Range    Magnesium 2 0 1 6 - 2 6 mg/dL   Phosphorus    Collection Time: 04/07/21  4:45 AM   Result Value Ref Range    Phosphorus 3 7 2 5 - 5 0 mg/dL   Prepare Leukoreduced RBC: 1 Units    Collection Time: 04/07/21  6:30 AM   Result Value Ref Range    Unit Product Code A2235Y86     Unit Number [id number]     Unit ABO O     Unit RH POS     Crossmatch Compatible     Unit Dispense Status Presumed Trans        Imaging Studies: Mri Brain W Wo Contrast    Result Date: 3/26/2021  Narrative: MRI BRAIN WITH AND WITHOUT CONTRAST INDICATION: R32: Unspecified urinary incontinence R41 3: Other amnesia R42: Dizziness and giddiness Z74 09: Other reduced mobility  COMPARISON:  None  TECHNIQUE: Sagittal T1, axial T2, axial FLAIR, axial T1, axial Garrison, axial diffusion  Sagittal, axial T1 postcontrast   Axial bravo postcontrast with coronal reconstructions  IV Contrast:  7 mL of Gadobutrol injection (SINGLE-DOSE)  IMAGE QUALITY:   Diagnostic   FINDINGS: BRAIN PARENCHYMA:  There is no discrete mass, mass effect or midline shift  There is no intracranial hemorrhage  Normal posterior fossa  Diffusion imaging is unremarkable  Minimal white matter change suggestive of mild chronic microangiopathic change Postcontrast imaging of the brain demonstrates no abnormal enhancement  VENTRICLES:  Normal for the patient's age  SELLA AND PITUITARY GLAND:  Normal  ORBITS:  Normal  PARANASAL SINUSES:  Normal  VASCULATURE:  Evaluation of the major intracranial vasculature demonstrates appropriate flow voids  CALVARIUM AND SKULL BASE:  Normal  EXTRACRANIAL SOFT TISSUES:  Normal      Impression: No acute intracranial abnormality  Minimal white matter change suggestive of chronic microangiopathic changes  Workstation performed: [id number]     Vas Carotid Complete Study    Result Date: 3/25/2021  Narrative:  [facility] REPORT CLINICAL: Indications: Patient presents with frequent dizziness and multiple cardiovascular risk factors  Operative History: Cardiac electrophysiology procedure Risk Factors The patient has history of HTN, HLD and previous smoking (quit >10yrs ago)  Clinical Right Pressure:  137/76 mm Hg, Left Pressure:  138/77 mm Hg  FINDINGS:  Right        Impression  PSV  EDV (cm/s)  Direction of Flow  Ratio  Dist  ICA                 75          22                      0 69  Mid  ICA                  73          14                      0 67  Prox   ICA    1 - 49%      88          20                      0 81  Dist CCA                  60           6                            Mid CCA                  108          18                      1 51  Prox CCA                  72           9                            Ext Carotid              124          14                      1 15  Prox Vert                 38          10  Antegrade                 Subclavian               140           0                             Left         Impression  PSV  EDV (cm/s)  Direction of Flow  Ratio  Dist  ICA 61          13                      1 03  Mid  ICA                  79          17                      1 34  Prox  ICA    1 - 49%      66          15                      1 13  Dist CCA                  60          16                            Mid CCA                   59          18                      0 96  Prox CCA                  61          15                            Ext Carotid              109           8                      1 85  Prox Vert                 43           9  Antegrade                 Subclavian               181           5                               CONCLUSION: Impression RIGHT: There is <50% stenosis noted in the internal carotid artery  Plaque is heterogenous and irregular  Vertebral artery flow is antegrade  There is no significant subclavian artery disease  LEFT: There is <50% stenosis noted in the internal carotid artery  Plaque is heterogenous and irregular  Vertebral artery flow is antegrade  There is no significant subclavian artery disease  Internal carotid artery stenosis determination by consensus criteria from: Roland Michael et al  Carotid Artery Stenosis: Gray-Scale and Doppler US Diagnosis - Society of Radiologists in [address] [place] [address] [facility], Radiology 2003; [id number]  SIGNATURE: Electronically Signed by: Ting Wharton MD, [facility] on 2021-03-25 05:39:17 PM      DATE: 7/8/2019  Surgeon: Jose Johnson MD    Procedure: Procedure(s):  FECAL MICROBIOTA TRANSPLANT    Post-Op Diagnosis Codes:  * Clostridium difficile infection [A49 8]    Condition at Discharge: Stable    Results and outcomes were reviewed with patient/family and questions answered  Discharge insructions were provided to patient on mobility limitations, signs and symptoms of potential complications, wound care, medicantion plan, treatment plan, and home safety  Patient Discharged to Home    Follow up instructions:   Findings:  Mild siigmoid diverticulosis was noted    Two small 3 mm polyps were noted in the descending colon  Internal hemorrhoids were noted  250 cc of Openbiome stool was instilled in the right colon  Recommendations:  1  Follow up with referring doctor  2  Could consider colonoscopy to remove 2 small descending colon polyps at later point (after 3-4 months)            Assessment/Plan: This is an 83 y/o female who presents with dizziness as well as melena and drop in hemoglobin  Hemoglobin was 10 9 on 03/26/2021 and then was 7 2 on admission  Patient is status post 1 unit of packed RBCs and hemoglobin is 8 4 this morning  Patient was not previously seen by our practice but reports that she had a colonoscopy within the past several years but denies ever having an upper endoscopy  Patient likely with upper GI source  Previous 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 was available in system which had shown mild sigmoid diverticulosis as well as 2 small 3 mm polyps and internal hemorrhoids  Patient's daughter then reports that she went and had polypectomy done thereafter by Dr Castro Sol  Continue NPO  Aspirin has been held  Continue IV Protonix and patient currently on Protonix drip  EGD will be planned for today for evaluation of upper GI source which could be peptic ulcer disease as there was report of patient taking NSAIDs  Follow serial H&H  Thank you for the consultation  Case will be discussed with Dr Ibanez Covert

## 2021-04-07 NOTE — ANESTHESIA PREPROCEDURE EVALUATION
Procedure:  EGD    Relevant Problems   CARDIO   (+) Aortic stenosis   (+) Hyperlipidemia   (+) Hypertension      HEMATOLOGY   (+) Acute blood loss anemia      MUSCULOSKELETAL   (+) Chronic bilateral low back pain without sciatica      Murmur  Consistent with AS, I cannot find an ECHO on the chart  Physical Exam    Airway    Mallampati score: III  TM Distance: >3 FB  Neck ROM: full     Dental   upper dentures and lower dentures,     Cardiovascular  Rhythm: regular, Rate: normal, Murmur,     Pulmonary  Pulmonary exam normal Breath sounds clear to auscultation,     Other Findings  Remaining teeth in poor condition  V/VI JAYLEEN consistent with /w AORTIC STENOSIS      Anesthesia Plan  ASA Score- 4     Anesthesia Type- IV sedation with anesthesia with ASA Monitors  Additional Monitors:   Airway Plan:           Plan Factors-Exercise tolerance (METS): <4 METS  Chart reviewed  EKG reviewed  Imaging results reviewed  Existing labs reviewed  Patient summary reviewed  Patient is not a current smoker  Patient instructed to abstain from smoking on day of procedure  Patient did not smoke on day of surgery  Obstructive sleep apnea risk education given perioperatively  Induction- intravenous  Postoperative Plan-     Informed Consent- Anesthetic plan and risks discussed with patient  I personally reviewed this patient with the CRNA  Discussed and agreed on the Anesthesia Plan with the CRNA  Jordan Alvarenga

## 2021-04-07 NOTE — ASSESSMENT & PLAN NOTE
· History of hypertension on lisinopril and amlodipine at home  · Continue home medications starting from tomorrow  · Monitor per unit protocol

## 2021-04-07 NOTE — H&P
[facility] [facility]  68   H&P- Rosie Chambers 1935, 82 y o  female MRN: [id number]  Unit/Bed#: -01 Encounter: [id number]  Primary Care Provider: Phyllis Frazier MD   Date and time admitted to hospital: 4/6/2021  5:13 PM    * Acute blood loss anemia  Assessment & Plan  · Presented with black tarry stool for more than 1 week  Worsening dizziness for 2 weeks  She has chronic dizziness for more than 10 years  · Denies any obvious bleeding or bruising  Not on anticoagulation  She is taking aspirin 81 mg daily  · She took Advil 2 tablets on empty stomach this morning  However she denies taking NSAID on regular basis  · She drinks 1 glass of wine every night  · Hemoglobin dropped 6 gram within 1 month  (14 g/dl to 8 g/dl)  · FOBT positive 2 weeks ago  · Patient does have history of aortic stenosis, no history of valvular surgery  No recent echocardiogram available  Not following with Cardiology  · Currently, vital signs are stable  Patient is euvolemic  CBC showed hemoglobin 8, hematocrit 24 5, CMP showed creatinine 1 23, BUN 20  EKG normal sinus rhythm  · Acute blood loss anemia likely due to GI bleed probably due to upper GI bleed in the setting of NSAID and alcohol use  Differential diagnosis is Angiodysplasia given aortic stenosis  · Colonoscopy in 2019 showed 2 polyps and diverticulosis  Plan:  · Keep NPO  · Monitor H&H Q 6 hourly  · Type and screen  · Blood transfusion consent was taken  Risks and benefits are discussed  · GI consult, input appreciated  Recommended to transfuse 1 PRBC and initiate protonix drip  · Will need endoscopy tomorrow  · Monitor hemodynamics  · Will give IV fluid hydration and monitor volume status  Dizziness  Assessment & Plan  · Presented with worsening dizziness and lightheadedness  · History of chronic dizziness for more than 10 years  Denies vertigo    Patient does have multiple neurological symptoms such as loss of taste, loss of smell, bilateral hearing loss, bilateral ear fullness, urinary incontinence for many years  · Extensive workup done including MRI brain ruled out schwannoma and NPH, bilateral carotid duplex ultrasound, so far negative  · Patient reports that meclizine does not help with her dizziness  · She took 2 Advil this morning hoping to resolve dizziness  She also have history of bilateral knee arthritis  · Worsening dizziness in the setting of acute blood loss anemia  · Underlying etiology for chronic dizziness is unclear  · Recommended to follow-up with neurology  Reports neurology appointment in July  Hyperlipidemia  Assessment & Plan  · Continue atorvastatin starting from tomorrow    Hypertension  Assessment & Plan  · History of hypertension on lisinopril and amlodipine at home  · Continue home medications starting from tomorrow  · Monitor per unit protocol    VTE Prophylaxis: Pharmacologic VTE Prophylaxis contraindicated due to Acute blood loss anemia  / sequential compression device   Code Status:  Full code  POLST: POLST form is not discussed and not completed at this time  Discussion with family:  Discussed with the patient's daughter Robbie at the bedside  Anticipated Length of Stay:  Patient will be admitted on an Inpatient basis with an anticipated length of stay of  > 2 midnights  Justification for Hospital Stay:  Acute blood loss anemia in the setting of GI bleed    Total Time for Visit, including Counseling / Coordination of Care: 45 minutes  Greater than 50% of this total time spent on direct patient counseling and coordination of care  Chief Complaint:   Dizziness    History of Present Illness: Maia Higgins is a 82 y o  female with past medical history of hypertension, hyperlipidemia, aortic stenosis, chronic dizziness and urinary incontinence who presents with worsening dizziness  She was sent to the ED from urgent care center as her hemoglobin dropped from 10 9 to 8 g per dL  Primary historians are patient and her daughter at the bedside  They reports patient is having dizziness for more than 10 years which is progressively getting worse since the beginning of this year  Patient denies spinning of the room  She reports her dizziness is more like lightheadedness and aggravated by standing up and progressively getting worse by movement  It is better with rest and sitting still  She denies any nausea, vomiting, fever, chills, recent illness  She visited ED 2 weeks ago due to worsening dizziness  At that time her hemoglobin was 10 9 and FOBT positive  She was advised to follow-up with primary care physician and discharged home with meclizine  She reports meclizine does not help her dizziness  She was advised to follow-up with GI by her PCP  She reports GI appointment will be tomorrow  She also reports of multiple chronic problems along with dizziness such as loss of taste, loss of smell, ear fullness, bilateral hearing impairment and urinary incontinence  She reports MRI of the brain and carotid duplex ultrasound was done recently which were reported negative  Patient also reports of dark color stool for more than 1 week  She reports her stool consistency has been changed which is more like mushy  She has bilateral knee arthritis  She reports she is not taking NSAID regularly  She took 2 Advils this morning on empty stomach  She drinks a glass of wine every night  She takes aspirin 81 mg daily  Not on blood thinning medication  Denies any obvious bleeding or bruising  Denies any nausea, vomiting, abdominal pain  She reports she has heart murmur since childhood  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  Colonoscopy was done at that time which was normal except polyps  In ED, initial vital signs were stable  Patient is euvolemic  CBC showed hemoglobin 8, hematocrit 24 5, CMP showed creatinine 1 33, BUN 20   EKG normal sinus rhythm  She was given IV Protonix 40 mg in ED  She was admitted to the hospital for further evaluation and management for acute blood loss anemia likely secondary to GI bleed  Discussed with Dr Elvia Merchant and recommended to give 1 PRCB and initiate protonix drip  Review of Systems:    Review of Systems   Constitutional: Positive for activity change and fatigue  Negative for appetite change, chills, diaphoresis, fever and unexpected weight change  HENT: Positive for hearing loss  Negative for ear discharge, ear pain, postnasal drip, sinus pain and sore throat  Feel congested in the inner ears  Chronic ear fullness   Respiratory: Positive for shortness of breath (SOB on exertion which is chronic)  Negative for cough, choking, chest tightness, wheezing and stridor  Cardiovascular: Negative for chest pain, palpitations and leg swelling  Gastrointestinal: Negative for abdominal distention, abdominal pain, anal bleeding, blood in stool (Black tarry stool), constipation, diarrhea, nausea, rectal pain and vomiting  Genitourinary: Negative  Musculoskeletal: Positive for arthralgias, gait problem and neck stiffness  Negative for back pain, joint swelling, myalgias and neck pain  Skin: Negative  Neurological: Positive for dizziness and light-headedness  Negative for tremors, seizures, syncope, facial asymmetry, speech difficulty, numbness and headaches  Hematological: Negative  Psychiatric/Behavioral: Negative for agitation, decreased concentration, sleep disturbance and suicidal ideas  The patient is not nervous/anxious          Past Medical and Surgical History:     Past Medical History:   Diagnosis Date    Aortic valve stenosis     Depression     H/O echocardiogram 08/2019    High cholesterol     Hyperlipidemia     Hypertension     Hypothyroidism     Insomnia     Mitral valve regurgitation        Past Surgical History:   Procedure Laterality Date    CARDIAC ELECTROPHYSIOLOGY PROCEDURE  09/2019    CATARACT EXTRACTION, BILATERAL  2016    COLONOSCOPY  2019    fecal implant    EYE SURGERY      KNEE CARTILAGE SURGERY      KNEE SURGERY Left     menuscus torn/repaired    MAMMO (HISTORICAL)  2017    [facility]       Meds/Allergies:    Prior to Admission medications    Medication Sig Start Date End Date Taking? Authorizing Provider   amLODIPine (NORVASC) 2 5 mg tablet Take 1 tablet (2 5 mg total) by mouth daily 8/31/20   Carmencita Way MD   aspirin (ASPIRIN 81) 81 mg chewable tablet Chew 81 mg    Historical Provider, MD   atorvastatin (LIPITOR) 20 mg tablet Take 1 tablet (20 mg total) by mouth daily 1/4/21   Carmencita Way MD   b complex-vitamin C-folic acid (NEPHROCAPS) 1 mg capsule Take 1 capsule by mouth    Historical Provider, MD   Calcium 600-200 MG-UNIT per tablet Take by mouth    Historical Provider, MD   lisinopril (ZESTRIL) 20 mg tablet TAKE 1 TABLET BY MOUTH EVERY DAY 3/3/21   Carmencita aWy MD   meclizine (ANTIVERT) 25 mg tablet Take 1 tablet (25 mg total) by mouth 3 (three) times a day as needed for dizziness  Patient not taking: Reported on 4/6/2021 3/26/21   Raymond Crockett DO   Omega-3 Fatty Acids (FISH OIL) 1200 MG CAPS Take 1,200 mg by mouth    Historical Provider, MD   sertraline (ZOLOFT) 25 mg tablet TAKE 1 TABLET BY MOUTH EVERY DAY IN THE MORNING 12/13/20   Carmencita Way MD   sertraline (ZOLOFT) 25 mg tablet Take 1 tablet (25 mg total) by mouth every morning 6/19/20   Carmencita Way MD     I have reviewed home medications with patient personally  Allergies: Allergies   Allergen Reactions    Amoxicillin Diarrhea       Social History:     Marital Status:     Occupation: Retired  Patient Pre-hospital Living Situation:   Patient Pre-hospital Level of Mobility: Independent  Patient Pre-hospital Diet Restrictions: Regular  Substance Use History:   Social History     Substance and Sexual Activity   Alcohol Use Yes    Frequency: 4 or more times a week    Drinks per session: 1 or 2     Social History     Tobacco Use   Smoking Status Former Smoker    Packs/day: 1 00    Years: 50 00    Pack years: 50 00    Quit date: 0    Years since quittin 2   Smokeless Tobacco Never Used     Social History     Substance and Sexual Activity   Drug Use Never       Family History:    non-contributory    Physical Exam:     Vitals:   Blood Pressure: 157/57 (21)  Pulse: 80 (21)  Temperature: 98 7 °F (37 1 °C) (21)  Temp Source: Tympanic (21)  Respirations: 18 (21)  Height: 5' 2" (157 5 cm) (21)  SpO2: 95 % (21)    Physical Exam  Vitals signs and nursing note reviewed  Constitutional:       General: She is not in acute distress  Appearance: Normal appearance  She is not ill-appearing  HENT:      Head: Normocephalic and atraumatic  Ears:      Comments: Hearing aids  [place]     Nose: No congestion  Mouth/Throat:      Mouth: Mucous membranes are moist       Pharynx: Oropharynx is clear  Eyes:      General: No scleral icterus  Extraocular Movements: Extraocular movements intact  Conjunctiva/sclera: Conjunctivae normal    Neck:      Comments: Resisted ROM  Cardiovascular:      Rate and Rhythm: Normal rate and regular rhythm  Pulses: Normal pulses  Heart sounds: Murmur present  Systolic murmur present with a grade of 3/6  Pulmonary:      Effort: Pulmonary effort is normal  No respiratory distress  Breath sounds: Normal breath sounds  No wheezing  Abdominal:      General: Abdomen is flat  Bowel sounds are normal  There is no distension  Palpations: Abdomen is soft  Tenderness: There is no abdominal tenderness  There is no guarding  Musculoskeletal:      Right lower leg: No edema  Left lower leg: No edema  Skin:     General: Skin is warm and dry  Capillary Refill: Capillary refill takes less than 2 seconds     Neurological:      General: No focal deficit present  Mental Status: She is alert and oriented to person, place, and time  Psychiatric:         Mood and Affect: Mood normal          Behavior: Behavior normal          Additional Data:     Lab Results: I have personally reviewed pertinent reports  Results from last 7 days   Lab Units 04/06/21  1732   WBC Thousand/uL 6 60   HEMOGLOBIN g/dL 8 0*   HEMATOCRIT % 25 5*   PLATELETS Thousands/uL 213   NEUTROS PCT % 60   LYMPHS PCT % 30   MONOS PCT % 8   EOS PCT % 2     Results from last 7 days   Lab Units 04/06/21  1732   SODIUM mmol/L 142   POTASSIUM mmol/L 3 8   CHLORIDE mmol/L 107   CO2 mmol/L 25   BUN mg/dL 20   CREATININE mg/dL 1 23*   ANION GAP mmol/L 10   CALCIUM mg/dL 9 4   ALBUMIN g/dL 4 0   TOTAL BILIRUBIN mg/dL 0 36   ALK PHOS U/L 40 5   ALT U/L 15   AST U/L 17   GLUCOSE RANDOM mg/dL 120         Results from last 7 days   Lab Units 04/06/21  1609   POC GLUCOSE mg/dl 100               Imaging: I have personally reviewed pertinent reports  No orders to display       EKG, Pathology, and Other Studies Reviewed on Admission:   · EKG: NSR, LAD, no ST-T wave changes    Allscripts / Epic Records Reviewed: Yes     ** Please Note: This note has been constructed using a voice recognition system   **

## 2021-04-07 NOTE — PHYSICAL THERAPY NOTE
PHYSICAL THERAPY EVALUATION  Time: 0375-0657    NAME:  Maia Higgins  DATE: 04/07/21    AGE:   82 y o  Mrn:   [id number]  Length Of Stay: 1    ADMIT DX:  Dizziness [R42]  Anemia [D64 9]  UGIB (upper gastrointestinal bleed) [K92 2]  Gastrointestinal hemorrhage, unspecified gastrointestinal hemorrhage type [K92 2]    Past Medical History:   Diagnosis Date    Aortic valve stenosis     Depression     H/O echocardiogram 08/2019    High cholesterol     Hyperlipidemia     Hypertension     Hypothyroidism     Insomnia     Mitral valve regurgitation      Past Surgical History:   Procedure Laterality Date    CARDIAC ELECTROPHYSIOLOGY PROCEDURE  09/2019    CATARACT EXTRACTION, BILATERAL  2016    COLONOSCOPY  2019    fecal implant    EYE SURGERY      KNEE CARTILAGE SURGERY      KNEE SURGERY Left     menuscus torn/repaired    MAMMO (HISTORICAL)  2017    [facility]       Performed at least 2 patient identifiers during session: Name, Angely Whitney and ID bracelet        04/07/21 1036   PT Last Visit   PT Visit Date 04/07/21   Note Type   Note type Evaluation   Pain Assessment   Pain Assessment Tool 0-10   Pain Score No Pain   Effect of Pain on Daily Activities n/a   Hospital Pain Intervention(s) Ambulation/increased activity   Multiple Pain Sites No   Home Living   Type of [place] to enter with rails; One level  (2 FREDERIC then single level home)   Bathroom Shower/Tub Walk-in shower   Bathroom Toilet Standard   Bathroom Equipment Built-in shower seat   [address]  (does not use)   Prior Function   Level of [place] Independent with ADLs and functional mobility   Lives With Alone   ADL Assistance Independent   IADLs Independent   Falls in the last 6 months 0  (pt reports h/o falls >1yr ago )   Vocational Retired   Comments Pt reports living at home alone in a [facility] with 2 FREDERIC  Pt is indep with all ADLs and ADLs   Ambulates household and community distances with no AD  Owns SPC  +drives  Restrictions/Precautions   Weight Bearing Precautions Per Order No   Other Precautions Bed Alarm;Telemetry;Multiple lines; Fall Risk   General   Additional Pertinent History Pt admitted 4/6/2021 with c/o 10 yrs of dizziness, recently worsening; melena x1 weeks; B LE weakness  Dx: Acute blood loss anemia due to suspected GI bleed  At time of PT IE, hgb 8 4, HCT 26 7  Family/Caregiver Present No   Cognition   Overall Cognitive Status WFL   Arousal/Participation Alert   Orientation Level Oriented X4   Memory Within functional limits   Following Commands Follows all commands and directions without difficulty   RUE Assessment   RUE Assessment WFL   LUE Assessment   LUE Assessment WFL   RLE Assessment   RLE Assessment WFL   LLE Assessment   LLE Assessment WFL   Coordination   Movements are Fluid and Coordinated 1   Sensation WFL   Light Touch   RLE Light Touch Grossly intact   LLE Light Touch Grossly intact   Bed Mobility   Supine to Sit 6  Modified independent   Additional items Assist x 1;HOB elevated; Bedrails   Sit to Supine 6  Modified independent   Additional items Assist x 1;HOB elevated   Transfers   Sit to Stand 5  Supervision   Additional items Assist x 1; Impulsive;Verbal cues  (no AD)   Stand to Sit 5  Supervision   Additional items Assist x 1; Impulsive  (no AD )   Stand pivot 5  Supervision   Additional items Assist x 1; Impulsive;Verbal cues  (no AD )   Ambulation/Elevation   Gait pattern Decreased foot clearance; Short stride  (moderate pathway deviation )   Gait Assistance 5  Supervision   Additional items Assist x 1;Verbal cues   Assistive Device None   Distance 34ft x1 including change in direction  (pt declines further ambulation d/t onset of dizziness )   Stair Management Assistance Not tested   Balance   Static Sitting Normal   Dynamic Sitting Good   Static Standing Fair +   Dynamic Standing Fair +   Ambulatory Fair +   Endurance Deficit   Endurance Deficit Yes Activity Tolerance   Activity Tolerance Patient limited by fatigue   Medical Staff Made Aware SLIM present during partial session    Nurse Made Aware Spoke with SYLVIE Jones   Assessment   Prognosis Good   Problem List Decreased endurance; Impaired balance;Decreased mobility; Impaired judgement;Decreased safety awareness   Assessment Pt seen for PT evaluation, cleared by SYLVIE Jones, activity orders of "up with assist"  Pt admitted 4/6/2021 with worsening dizziness, melena, B LE weakness, dx Acute blood loss anemia  Comorbidities affecting pt's fnxl performance include: depression, hypertension, hypothyroid and hyperlipidemia, aortic valve stenosis, cataract surgery, L knee surgery  Personal factors affecting pt at time of PT IE include: lives in 1 story house, stairs to enter home, inability to navigate community distances, limited home support, impulsivity and limited insight into impairments  PTA, pt was independent with functional mobility without assistive device, independent with ADLS, independent with IADLS and living alone in 1 story home with 2 steps to enter  Pt scores 20/24 on AM-PAC objective tool, indicating pt demonstrates functional capacity for return to home self care vs with increased supports upon d/c  The Barthel Index outcome tool was used & pt scores 60 indicating severe limitations of fnxl mobility/ADLS  Pt is at risk of falls d/t impulsivity, impaired balance, impaired insight/safety awareness, acuity of medical illness, ongoing medical treatment of primary diagnosis and abnormal lab values  Pt's clinical presentation is currently unstable/unpredictable seen in pt's presentation of vital sign response, changing level of pain, increased fall risk, new onset of impairment of functional mobility and decreased endurance   This PT IE requires high complexity clinical decision making, based on multiple medical/physiological/fnxl/social factors which affect pt's performance w/ evaluation & therapist judgement for disposition recommendations  Pt will benefit from continued PT treatment in order to address impairments, decrease risk of falls, maximize independence w/ fnxl mobility, & ensure safety w/ mobility for transition to next level of care  Based on pt presentation & impairments, pt would most appropriately benefit from return to home with home health PT services upon d/c     Barriers to Discharge Decreased caregiver support   Goals   Patient Goals "to feel better and go home"   [facility] Expiration Date 04/17/21   Short Term Goal #1 Patient PT goals established in order to address patient self reported goal of "to feel better and go home"  1  Pt will complete all bed mobility in flat bed independently, in order to promote increased OOB functional mobility to improve overall activity tolerance  2  Pt will complete all transfers independently and safely, in order to increase safety with functional mobility  3  Pt will ambulate >150ft with LRAD at [address] [place] [address] in order to increase safety with household and community distance functional mobility  4  Pt will negotiate 2 standard height steps with LRAD at [facility] in order to access her home  5  Pt will improve AM-PAC score to >/= 24/24 in order to increase independence with mobility and decrease burden of care  6  Pt will improve Barthel Index score to >/= 70/100 in order to increase independence and decrease risk of falls  7  Pt will improve ambulatory balance by >/= 1/2 grade in order to promote safety and increased independence with mobility  PT Treatment Day 0   Plan   Treatment/Interventions Functional transfer training;Elevations; Endurance training;Bed mobility;Gait training;Spoke to MD;Spoke to nursing;Spoke to case management;OT;Equipment eval/education;Patient/family training   PT Frequency Other (Comment)  (3-5x/wk )   Recommendation   PT Discharge Recommendation Home with skilled therapy   Equipment Recommended   (TBD pending progress)   Additional Comments Per [place], pt planned for endoscopy later in PM     AM-PAC Basic Mobility Inpatient   Turning in Bed Without Bedrails 4   Lying on Back to Sitting on Edge of Flat Bed 4   Moving Bed to Chair 3   Standing Up From Chair 3   Walk in Room 3   Climb 3-5 Stairs 3   Basic Mobility Inpatient Raw Score 20   Basic Mobility Standardized Score 43 99   Modified [place] Scale   Modified [place] Scale 3   Barthel Index   Feeding 10   Bathing 0   Grooming Score 5   Dressing Score 5   Bladder Score 10   Bowels Score 10   Toilet Use Score 5   Transfers (Bed/Chair) Score 10   Mobility (Level Surface) Score 0   Stairs Score 5   Barthel Index Score 60       The patient's AM-PAC Basic Mobility Inpatient Short Form Raw Score is 20, Standardized Score is 43 99  A standardized score greater than 42 9 suggests the patient may benefit from discharge to home  Please also refer to the recommendation of the Physical Therapist for safe discharge planning          Ole Walden, PT, DPT   Available via [employer]  NPI # [id number]  [place] License - [id number]  [place] License - [id number]  4/7/2021

## 2021-04-07 NOTE — ASSESSMENT & PLAN NOTE
· Presented with black tarry stool for more than 1 week  Worsening dizziness for 2 weeks  She has chronic dizziness for more than 10 years  · Denies any obvious bleeding or bruising  Not on anticoagulation  She is taking aspirin 81 mg daily  · She took Advil 2 tablets on empty stomach this morning  However she denies taking NSAID on regular basis  · She drinks 1 glass of wine every night  · Hemoglobin dropped 6 gram within 1 month  (14 g/dl to 8 g/dl)  · FOBT positive 2 weeks ago  · Patient does have history of aortic stenosis, no history of valvular surgery  No recent echocardiogram available  Not following with Cardiology  · Currently, vital signs are stable  Patient is euvolemic  CBC showed hemoglobin 8, hematocrit 24 5, CMP showed creatinine 1 23, BUN 20  EKG normal sinus rhythm  · Acute blood loss anemia likely due to GI bleed probably due to upper GI bleed in the setting of NSAID and alcohol use  Differential diagnosis is Angiodysplasia given aortic stenosis  · Colonoscopy in 2019 showed 2 polyps and diverticulosis  Plan:  · Keep NPO  · Monitor H&H Q 6 hourly  · Type and screen  · Blood transfusion consent was taken  Risks and benefits are discussed  · GI consult, input appreciated  Recommended to transfuse 1 PRBC and initiate protonix drip  · Will need endoscopy tomorrow  · Monitor hemodynamics  · Will give IV fluid hydration and monitor volume status

## 2021-04-07 NOTE — UTILIZATION REVIEW
Initial Clinical Review    Admission: Date/Time/Statement:   Admission Orders (From admission, onward)     Ordered        04/06/21 1852  Inpatient Admission  Once                   Orders Placed This Encounter   Procedures    Inpatient Admission     Standing Status:   Standing     Number of Occurrences:   1     Order Specific Question:   Level of Care     Answer:   Med Surg [16]     Order Specific Question:   Estimated length of stay     Answer:   More than 2 Midnights     Order Specific Question:   Certification     Answer:   I certify that inpatient services are medically necessary for this patient for a duration of greater than two midnights  See H&P and MD Progress Notes for additional information about the patient's course of treatment  ED Arrival Information     Expected Arrival Acuity Means of Arrival Escorted By Service Admission Type    4/6/2021 4/6/2021 17:10 Urgent Walk-In Self Hospitalist Urgent    Arrival Complaint    Dizzinesses        Chief Complaint   Patient presents with    Dizziness     For 10yrs +, was sent by PCP for lab work      Assessment/Plan: 81 yo female w/ pmh aortic stenosis,htn, hyperlipidemia, dizziness, urinary incontinence, to ED from home admitted Inpatient d/t acute blood loss anemia  Presented with black tarry stool x 1 week  Worsening dizziness x 2 weeks  Not on anticoagulation  Takes asa  Took Advil 2 tablets on empty stomach this morning  drinks 1 glass of wine every night  Hgb dropped 6 gram within 1 month  (14 g/dl to 8 g/dl)  Hgb 8 on 4/6  Hct 24 5  EKG normal sinus rhythm  FOBT positive 2 weeks ago  NPO  h&h q6h  PRBC  PPI gtt  IVF  endoscopy ordered  GI consult  4/7: SOB  Hemodynamically stable and afebrile   hgb 8  4 hct 26 7  S/p PRBC  PPI gtt  IVF  endoscopy ordered  4/7 GI consult:melena and drop in hemoglobin  likely upper GI source   Previous 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 was available in system which had shown mild sigmoid diverticulosis as well as 2 small 3 mm polyps and internal hemorrhoids  Hgb 10 9 on 3/26, then hgb 7 2 on 4/6  S/P PRBC, hgb 8 4 today  NPO  Asa on hold  IV PPI  EGD ordered for today        ED Triage Vitals   Temperature Pulse Respirations Blood Pressure SpO2   04/06/21 1718 04/06/21 1718 04/06/21 1718 04/06/21 1718 04/06/21 1718   98 7 °F (37 1 °C) 94 18 134/57 94 %      Temp Source Heart Rate Source Patient Position - Orthostatic VS BP Location FiO2 (%)   04/06/21 2010 04/06/21 2010 04/06/21 1731 04/06/21 1731 --   Tympanic Monitor Lying - Orthostatic VS Left arm       Pain Score       04/06/21 2010       No Pain          Wt Readings from Last 1 Encounters:   04/06/21 76 2 kg (168 lb)     Additional Vital Signs:   04/07/21 0732  98 3 °F (36 8 °C)  69  20  126/61  92  90 %  None (Room air)  Lying   04/07/21 0413  97 4 °F (36 3 °C)Abnormal   63  18  122/59  81  94 %  None (Room air)  Lying   04/07/21 0212  98 3 °F (36 8 °C)  68  18  124/46Abnormal   --  94 %  None (Room air)  Lying   04/07/21 0146  97 9 °F (36 6 °C)  70  18  123/50  --  --  --  --   04/06/21 2018  --  80  --  157/57  89  --  --  Standing   04/06/21 2015  --  90  --  144/65  92  --  --  Sitting   04/06/21 2010  98 7 °F (37 1 °C)  77  18  137/69  --  95 %  None (Room air)  Lying   04/06/21 1733  --  101  --  117/50  --  --  --  Standing - Orthostatic VS   04/06/21 1732  --  89  --  123/46Abnormal   --  --  --  Sitting - Orthostatic VS   04/06/21 1731  --  99  --  130/45Abnormal   --  --  --  Lying - Orthostatic VS   04/06/21 1722  --  --  --  --  --  95 %  --  --   04/06/21 1718  98 7 °F (37 1 °C)  94  18  134/57  --  94 %  --  --       Pertinent Labs/Diagnostic Test Results:       Results from last 7 days   Lab Units 04/07/21  0445 04/06/21  2322 04/06/21  1732   WBC Thousand/uL 5 24  --  6 60   HEMOGLOBIN g/dL 8 4* 7 3*  7 2* 8 0*   HEMATOCRIT % 26 7* 23 3*  23 0* 25 5*   PLATELETS Thousands/uL 191  --  213   NEUTROS ABS Thousands/µL 2 71  --  3 97         Results from last 7 days   Lab Units 04/07/21  0445 04/06/21  1732   SODIUM mmol/L 145 142   POTASSIUM mmol/L 3 6 3 8   CHLORIDE mmol/L 109* 107   CO2 mmol/L 29 25   ANION GAP mmol/L 7 10   BUN mg/dL 17 20   CREATININE mg/dL 1 22* 1 23*   EGFR ml/min/1 73sq m 41 41   CALCIUM mg/dL 8 7 9 4   MAGNESIUM mg/dL 2 0 2 0   PHOSPHORUS mg/dL 3 7  --      Results from last 7 days   Lab Units 04/06/21  1732   AST U/L 17   ALT U/L 15   ALK PHOS U/L 40 5   TOTAL PROTEIN g/dL 6 4   ALBUMIN g/dL 4 0   TOTAL BILIRUBIN mg/dL 0 36     Results from last 7 days   Lab Units 04/06/21  1609   POC GLUCOSE mg/dl 100     Results from last 7 days   Lab Units 04/07/21  0445 04/06/21  1732   GLUCOSE RANDOM mg/dL 88 120       Results from last 7 days   Lab Units 04/06/21  1732   TROPONIN I ng/mL <0 03         Results from last 7 days   Lab Units 04/06/21  2322   PROTIME seconds 10 9   INR  0 96     Results from last 7 days   Lab Units 04/06/21  1732   TSH 3RD GENERATON uIU/mL 2 372     Results from last 7 days   Lab Units 04/06/21  1732   FERRITIN ng/mL 15     4/6 EKG:ECG 12 Lead Documentation Only   Date/Time: 4/6/2021 5:35 PM  Performed by: Wyatt Marion PA-C  Authorized by: Wyatt Marion PA-C      Indications / Diagnosis:  Dizziness  ECG reviewed by me, the ED Provider: yes    Patient location:  ED  Previous ECG:     Previous ECG:  Compared to current    Similarity:  No change  Interpretation:     Interpretation: normal    Rate:     ECG rate:  92    ECG rate assessment: normal    Rhythm:     Rhythm: sinus rhythm    Ectopy:     Ectopy: none    QRS:     QRS axis:  Left    QRS intervals:  Normal  Conduction:     Conduction: normal    ST segments:     ST segments:  Normal  T waves:     T waves: normal       4/6 EKG:  Sinus rhythm  Poor R wave progression  Low voltage, precordial leads     Confirmed by Lennox Su ([id number]) on 4/6/2021 6:51:41 PM    4/6 EKG:Normal sinus rhythm  Normal ECG  No previous ECGs available  Confirmed by Betsy Petit Hafsa Astorga ([id number]) on 4/7/2021 7:52:29 AM    ED Treatment:   Medication Administration from 04/06/2021 1632 to 04/06/2021 1954       Date/Time Order Dose Route Action Action by Comments     04/06/2021 1911 pantoprazole (PROTONIX) injection 40 mg 40 mg Intravenous Given          Past Medical History:   Diagnosis Date    Aortic valve stenosis     Depression     H/O echocardiogram 08/2019    High cholesterol     Hyperlipidemia     Hypertension     Hypothyroidism     Insomnia     Mitral valve regurgitation      Present on Admission:  **None**      Admitting Diagnosis: Dizziness [R42]  Anemia [D64 9]  UGIB (upper gastrointestinal bleed) [K92 2]  Gastrointestinal hemorrhage, unspecified gastrointestinal hemorrhage type [K92 2]  Age/Sex: 82 y o  female  Admission Orders:  Scheduled Medications:  amLODIPine, 2 5 mg, Oral, Daily  atorvastatin, 20 mg, Oral, Daily With Dinner  b complex-vitamin C-folic acid, 1 capsule, Oral, Daily With Dinner  sertraline, 25 mg, Oral, QAM      Continuous IV Infusions:  multi-electrolyte, 75 mL/hr, Intravenous, Continuous  pantoprozole (PROTONIX) infusion (Continuous), 8 mg/hr, Intravenous, Continuous      PRN Meds:  acetaminophen, 650 mg, Oral, Q6H PRN 4/7 X 2  ondansetron, 4 mg, Intravenous, Q6H PRN    PRBC 1 UNIT  SCD  FALL PRECAUTIONS  PT/OT  ORTHO BP  I&O  OOB      IP CONSULT TO GASTROENTEROLOGY    Network Utilization Review Department  ATTENTION: Please call with any questions or concerns to [phone] and carefully listen to the prompts so that you are directed to the right person  All voicemails are confidential   Marla Graham all requests for admission clinical reviews, approved or denied determinations and any other requests to dedicated fax number below belonging to the campus where the patient is receiving treatment   List of dedicated fax numbers for the Facilities:  [address] [place] [address] DENIALS (Administrative/Medical Necessity) [phone]   [address] (Maternity/NICU/Pediatrics) [address] [facility] [facility] [address] [phone]   Armond Huerta 9767 (  Trevon Lee "Mary Beth" 103) [address] [place] [address] Ivone Richard 1481 [phone]   [place] [address] [phone]

## 2021-04-07 NOTE — PROGRESS NOTES
[facility]  68   Progress Note - Td Garrido 1935, 82 y o  female MRN: [id number]  Unit/Bed#: -01 Encounter: [id number]  Primary Care Provider: Carmencita Way MD   Date and time admitted to hospital: 4/6/2021  5:13 PM    * Acute blood loss anemia  Assessment & Plan  · Presented with black tarry stool for more than 1 week  Worsening dizziness for 2 weeks  She has chronic dizziness for more than 10 years  · Denies any obvious bleeding or bruising  Not on anticoagulation  She is taking aspirin 81 mg daily  · She took Advil 2 tablets on empty stomach this morning  She denies taking Advil or other NSAIDs on a regular basis  · She drinks 1 glass of wine every night  · Hemoglobin dropped 6 gram within 1 month  (14 g/dl to 8 g/dl); status post 1 RBC transfusion, hemoglobin has improved to 8 2  · FOBT positive 2 weeks ago  · Currently, vital signs are stable  Patient is euvolemic  CBC showed hemoglobin 8, hematocrit 24 5, CMP showed creatinine 1 23, BUN 20  EKG normal sinus rhythm  · Acute blood loss anemia likely due to GI bleed probably due to upper GI bleed in the setting of NSAID and alcohol use  · Colonoscopy in 2019 showed 2 polyps and diverticulosis  · GI consulted, on board, EGD to be done today    Plan:  · Continue NPO  · Monitor H&H Q 6 hourly  · Type and screen done; post 1 unit PRBC  · Continuing Protonix drip  · Monitor hemodynamics  · Will give IV fluid hydration and monitor volume status  Hyperlipidemia  Assessment & Plan  · Continue atorvastatin starting from tomorrow    Hypertension  Assessment & Plan  · History of hypertension on lisinopril and amlodipine at home  · Continue home medications starting from tomorrow  · Monitor per unit protocol    Dizziness  Assessment & Plan  · Presented with worsening dizziness and lightheadedness  · History of chronic dizziness for more than 10 years  Denies vertigo    Patient does have multiple neurological symptoms such as loss of taste, loss of smell, bilateral hearing loss, bilateral ear fullness, urinary incontinence for many years  · Extensive workup done including MRI brain ruled out schwannoma and NPH, bilateral carotid duplex ultrasound, so far negative  · Patient reports that meclizine does not help with her dizziness  · Worsening dizziness in the setting of acute blood loss anemia  · Underlying etiology for chronic dizziness is unclear        -Recommended to follow-up with neurology  Reports neurology appointment in July  VTE Pharmacologic Prophylaxis:   Pharmacologic: Pharmacologic VTE Prophylaxis contraindicated due to Acute blood loss  Mechanical VTE Prophylaxis in Place: Yes    Discussions with Specialists or Other Care Team Provider:  Gastroenterology    Education and Discussions with Family / Patient:  Discussed with patient and her daughter at bedside    Current Length of Stay: 1 day(s)    Current Patient Status: Inpatient     Discharge Plan / Estimated Discharge Date:  Pending    Code Status: Level 1 - Full Code      Subjective:   Patient was seen and examined by me at bedside  Communicated clearly  No particular overnight event reported  Hemodynamically stable and afebrile  Objective:     Vitals:   Temp (24hrs), Av 3 °F (36 8 °C), Min:97 4 °F (36 3 °C), Max:98 8 °F (37 1 °C)    Temp:  [97 4 °F (36 3 °C)-98 8 °F (37 1 °C)] 98 3 °F (36 8 °C)  HR:  [] 69  Resp:  [18-20] 20  BP: (117-157)/(45-69) 126/61  SpO2:  [90 %-98 %] 90 %  Body mass index is 30 73 kg/m²  Input and Output Summary (last 24 hours): Intake/Output Summary (Last 24 hours) at 2021 1304  Last data filed at 2021 0146  Gross per 24 hour   Intake 350 ml   Output --   Net 350 ml       Physical Exam:     Physical Exam  Vitals signs and nursing note reviewed  Constitutional:       General: She is not in acute distress  Appearance: Normal appearance  She is not ill-appearing     HENT: Head: Normocephalic and atraumatic  Ears:      Comments: Hearing aids  [place]     Nose: No congestion  Mouth/Throat:      Mouth: Mucous membranes are moist       Pharynx: Oropharynx is clear  Eyes:      General: No scleral icterus  Extraocular Movements: Extraocular movements intact  Conjunctiva/sclera: Conjunctivae normal    Cardiovascular:      Rate and Rhythm: Normal rate and regular rhythm  Pulses: Normal pulses  Heart sounds: Murmur present  Pulmonary:      Effort: Pulmonary effort is normal  No respiratory distress  Breath sounds: Normal breath sounds  No wheezing  Abdominal:      General: Abdomen is flat  Bowel sounds are normal  There is no distension  Palpations: Abdomen is soft  Tenderness: There is no abdominal tenderness  There is no guarding  Musculoskeletal:      Right lower leg: No edema  Left lower leg: No edema  Skin:     General: Skin is warm and dry  Capillary Refill: Capillary refill takes less than 2 seconds  Neurological:      General: No focal deficit present  Mental Status: She is alert and oriented to person, place, and time  Psychiatric:         Mood and Affect: Mood normal          Behavior: Behavior normal          Additional Data:     Labs:    Results from last 7 days   Lab Units 04/07/21  1224 04/07/21  0445   WBC Thousand/uL  --  5 24   HEMOGLOBIN g/dL 8 6* 8 4*   HEMATOCRIT % 27 1* 26 7*   PLATELETS Thousands/uL  --  191   NEUTROS PCT %  --  52   LYMPHS PCT %  --  36   MONOS PCT %  --  9   EOS PCT %  --  3     Results from last 7 days   Lab Units 04/07/21  0445 04/06/21  1732   POTASSIUM mmol/L 3 6 3 8   CHLORIDE mmol/L 109* 107   CO2 mmol/L 29 25   BUN mg/dL 17 20   CREATININE mg/dL 1 22* 1 23*   CALCIUM mg/dL 8 7 9 4   ALK PHOS U/L  --  40 5   ALT U/L  --  15   AST U/L  --  17     Results from last 7 days   Lab Units 04/06/21  2322   INR  0 96       * I Have Reviewed All Lab Data Listed Above    * Additional Pertinent Lab Tests Reviewed: All Labs Within Last 24 Hours Reviewed    Imaging:    Imaging Reports Reviewed Today Include:  None  Imaging Personally Reviewed by Myself Includes:  None    Recent Cultures (last 7 days):           Last 24 Hours Medication List:   Current Facility-Administered Medications   Medication Dose Route Frequency Provider Last Rate    acetaminophen  650 mg Oral Q6H PRN [place] Lucio MD      atorvastatin  20 mg Oral Daily With Naman Dunbar MD      b complex-vitamin C-folic acid  1 capsule Oral Daily With Naman Dunbar MD      multi-electrolyte  75 mL/hr Intravenous Continuous Mohamud Cheung MD 75 mL/hr (04/07/21 0405)    ondansetron  4 mg Intravenous Q6H PRN Washington Lucio MD      pantoprozole (PROTONIX) infusion (Continuous)  8 mg/hr Intravenous Continuous Washingt[place] Lucio MD 8 mg/hr (04/07/21 0133)    sertraline  25 mg Oral QAM Washington Lucio MD          Today, Patient Was Seen By: Aurelio Devries MD    ** Please Note: This note has been constructed using a voice recognition system   **

## 2021-04-07 NOTE — ANESTHESIA POSTPROCEDURE EVALUATION
Post-Op Assessment Note    CV Status:  Stable    Pain management: adequate     Mental Status:  Sleepy   Hydration Status:  Euvolemic   PONV Controlled:  Controlled   Airway Patency:  Patent      Post Op Vitals Reviewed: Yes      Staff: CRNA         No complications documented      BP   104/76   Temp      Pulse  74   Resp   16   SpO2   97

## 2021-04-07 NOTE — CONSULTS
Consultation -[facility]   Joaquinlaura Renteria 82 y o  female MRN: [id number]    Unit/Bed#: -01 Encounter: [id number]      Physician Requesting Consult: Dr Roshan Purcell    Reason for Consult / Principal Problem: Gi bleed     HPI:This is an 82 y o  female with past medical history of hypertension, hyperlipidemia, aortic stenosis, chronic dizziness and urinary incontinence who presents with worsening dizziness  She was sent to the ED from urgent care center as her hemoglobin dropped from 10 9 to 8 g per dL  Patient has been having dizziness for more than 10 years which is progressively getting worse since the beginning of this year  She reports her dizziness is more like lightheadedness and aggravated by standing up and progressively getting worse by movement  It is better with rest and sitting still  She denies any nausea, vomiting, fever, chills, recent illness  She visited ED 2 weeks ago due to worsening dizziness  At that time her hemoglobin was 10 9 and FOBT positive  She was advised to follow-up with primary care physician and discharged home with meclizine  She reports GI appointment will be tomorrow  She also reports of multiple chronic problems along with dizziness such as loss of taste, loss of smell, ear fullness, bilateral hearing impairment and urinary incontinence  She reports MRI of the brain and carotid duplex ultrasound was done recently which were reported negative  Patient also reports of dark color stool for more than 1 week  She reports her stool consistency has been changed which is more like mushy  She has bilateral knee arthritis  She reports she is not taking NSAID regularly  She took 2 Advils yesterday on empty stomach  She drinks a glass of wine every night  She takes aspirin 81 mg daily  Not on blood thinning medication  Denies any obvious bleeding or bruising  Denies any nausea, vomiting, abdominal pain    She reports she has heart murmur since childhood  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  Colonoscopy was done at that time which was normal except polyps  Patient states that she does not have any significant reflux symptoms or difficulty swallowing or nausea or vomiting  States that her stools have been very dark brown  She does admit to shortness of breath  Denies any abdominal pain or history of an EGD in the past or history of peptic ulcer disease  Hemoglobin was 7 3 on admission and she got 1 unit PRBCs  and now it is 8 4        Allergies:    Allergies   Allergen Reactions    Amoxicillin Diarrhea       Medications:  Current Facility-Administered Medications:     acetaminophen (TYLENOL) tablet 650 mg, 650 mg, Oral, Q6H PRN, Marianne Moe MD, 650 mg at 04/07/21 0057    amLODIPine (NORVASC) tablet 2 5 mg, 2 5 mg, Oral, Daily, Marianne Moe MD    atorvastatin (LIPITOR) tablet 20 mg, 20 mg, Oral, Daily With Tali Becker MD    b complex-vitamin C-folic acid (NEPHROCAPS) capsule 1 capsule, 1 capsule, Oral, Daily With Dinner, Marianne Moe MD    multi-electrolyte (PLASMALYTE-A/ISOLYTE-S PH 7 4) IV solution, 75 mL/hr, Intravenous, Continuous, Doe Hand MD, Last Rate: 75 mL/hr at 04/07/21 0405, 75 mL/hr at 04/07/21 0405    ondansetron (ZOFRAN) injection 4 mg, 4 mg, Intravenous, Q6H PRN, Marianne Moe MD    [COMPLETED] pantoprazole (PROTONIX) 80 mg in sodium chloride 0 9 % 100 mL IVPB, 80 mg, Intravenous, Once, Last Rate: 200 mL/hr at 04/07/21 0048, 80 mg at 04/07/21 0048 **AND** pantoprazole (PROTONIX) 80 mg in sodium chloride 0 9 % 100 mL infusion, 8 mg/hr, Intravenous, Continuous, Timothy Ruano MD, Last Rate: 10 mL/hr at 04/07/21 0133, 8 mg/hr at 04/07/21 0133    sertraline (ZOLOFT) tablet 25 mg, 25 mg, Oral, QAM, Marianne Moe MD    Past Medical history:  Past Medical History:   Diagnosis Date    Aortic valve stenosis     Depression     H/O echocardiogram 08/2019    High cholesterol     Hyperlipidemia  Hypertension     Hypothyroidism     Insomnia     Mitral valve regurgitation        Past Surgical History:   Past Surgical History:   Procedure Laterality Date    CARDIAC ELECTROPHYSIOLOGY PROCEDURE  2019    CATARACT EXTRACTION, BILATERAL  2016    COLONOSCOPY  2019    fecal implant    EYE SURGERY      KNEE CARTILAGE SURGERY      KNEE SURGERY Left     menuscus torn/repaired    MAMMO (HISTORICAL)      [facility]       Social history:   Social History     Socioeconomic History    Marital status:       Spouse name: Not on file    Number of children: Not on file    Years of education: 6    Highest education level: Not on file   Occupational History    Occupation: Retired   Social Needs    Financial resource strain: Not on file    Food insecurity     Worry: Not on file     Inability: Not on file   [employer] needs     Medical: Not on file     Non-medical: Not on file   Tobacco Use    Smoking status: Former Smoker     Packs/day: 1 00     Years: 50 00     Pack years: 50 00     Quit date:      Years since quittin 2    Smokeless tobacco: Never Used   Substance and Sexual Activity    Alcohol use: Yes     Frequency: 4 or more times a week     Drinks per session: 1 or 2    Drug use: Never    Sexual activity: Not on file   Lifestyle    Physical activity     Days per week: Not on file     Minutes per session: Not on file    Stress: Not on file   Relationships    Social connections     Talks on phone: Not on file     Gets together: Not on file     Attends Latter day service: Not on file     Active member of club or organization: Not on file     Attends meetings of clubs or organizations: Not on file     Relationship status: Not on file    Intimate partner violence     Fear of current or ex partner: Not on file     Emotionally abused: Not on file     Physically abused: Not on file     Forced sexual activity: Not on file   Other Topics Concern    Not on file   Social History Narrative Most recent tobacco use screenin-    Do you currently or have you served in the [employer]: No    Were you activated, into active duty, as a member of the [employer] or as a Reservist: No    Occupation: Retired    Education: 6    Marital status:     Exercise level: Occasional    Diet: Regular    General stress level: High    Has smoked since age: 8    Alcohol intake: Moderate    1 glass of wine daily    Caffeine intake:  Moderate    Chewing tobacco: none    Illicit drugs: no    Seat belts used routinely: Yes    Sunscreen used routinely: No    Smoke alarm in home: Yes    Advance directive: Yes    Salt Intake: minimal       Review of Systems: All other systems were reviewed and were negative, otherwise please refer to HPI    Physical Exam: /61 (BP Location: Left arm)   Pulse 69   Temp 98 3 °F (36 8 °C) (Tympanic)   Resp 20   Ht 5' 2" (1 575 m)   SpO2 90%   BMI 30 73 kg/m²     General Appearance:    Alert, cooperative, no distress, appears stated age   Head:    Normocephalic, without obvious abnormality, atraumatic   Eyes:    No scleral icterus           Mouth:  Mucosa moist   Neck:   Supple, symmetrical, trachea midline, no thyromegaly       Lungs:     Clear to auscultation bilaterally, respirations unlabored       Heart:    Regular rate and rhythm, S1 and S2 normal, +systolic murmur, rub or gallop     Abdomen:     Soft, non-tender, bowel sounds active all four quadrants,     no masses, no organomegaly   Genitalia:   deferred   Rectal:   deferred   Extremities:   Extremities normal,no cyanosis or edema       Skin:   Skin color, texture, turgor normal, no rashes or lesions       Neurologic:   Grossly intact, no focal deficit           Lab Results:   Recent Results (from the past 24 hour(s))   Fingerstick Glucose (POCT)    Collection Time: 21  4:09 PM   Result Value Ref Range    POC Glucose 100 65 - 140 mg/dl   ECG 12 lead    Collection Time: 21  4:20 PM   Result Value Ref Range    Ventricular Rate 81 BPM    Atrial Rate 81 BPM    SC Interval 148 ms    QRSD Interval 78 ms    QT Interval 374 ms    QTC Interval 434 ms    P [place] 68 degrees    QRS Axis -27 degrees    T Wave [place] 58 degrees   ECG 12 lead    Collection Time: 04/06/21  5:19 PM   Result Value Ref Range    Ventricular Rate 92 BPM    Atrial Rate 90 BPM    SC Interval 149 ms    QRSD Interval 85 ms    QT Interval 359 ms    QTC Interval 445 ms    P Axis 73 degrees    QRS Axis -19 degrees    T Wave Axis 62 degrees   CBC and differential    Collection Time: 04/06/21  5:32 PM   Result Value Ref Range    WBC 6 60 4 31 - 10 16 Thousand/uL    RBC 2 72 (L) 3 81 - 5 12 Million/uL    Hemoglobin 8 0 (L) 11 5 - 15 4 g/dL    Hematocrit 25 5 (L) 34 8 - 46 1 %    MCV 94 82 - 98 fL    MCH 29 4 26 8 - 34 3 pg    MCHC 31 4 31 4 - 37 4 g/dL    RDW 14 7 11 6 - 15 1 %    MPV 10 4 8 9 - 12 7 fL    Platelets [phone] Thousands/uL    Neutrophils Relative 60 43 - 75 %    Immat GRANS % 0 0 - 2 %    Lymphocytes Relative 30 14 - 44 %    Monocytes Relative 8 4 - 12 %    Eosinophils Relative 2 0 - 6 %    Basophils Relative 0 0 - 1 %    Neutrophils Absolute 3 97 1 85 - 7 62 Thousands/µL    Immature Grans Absolute 0 01 0 00 - 0 20 Thousand/uL    Lymphocytes Absolute 1 95 0 60 - 4 47 Thousands/µL    Monocytes Absolute 0 52 0 17 - 1 22 Thousand/µL    Eosinophils Absolute 0 13 0 00 - 0 61 Thousand/µL    Basophils Absolute 0 02 0 00 - 0 10 Thousands/µL   Comprehensive metabolic panel    Collection Time: 04/06/21  5:32 PM   Result Value Ref Range    Sodium 142 133 - 145 mmol/L    Potassium 3 8 3 5 - 5 0 mmol/L    Chloride 107 96 - 108 mmol/L    CO2 25 22 - 33 mmol/L    ANION GAP 10 4 - 13 mmol/L    BUN 20 6 - 20 mg/dL    Creatinine 1 23 (H) 0 40 - 1 10 mg/dL    Glucose 120 65 - 140 mg/dL    Calcium 9 4 8 4 - 10 2 mg/dL    AST 17 15 - 41 U/L    ALT 15 5 - 54 U/L    Alkaline Phosphatase 40 5 35 - 140 U/L    Total Protein 6 4 6 4 - 8 3 g/dL    Albumin 4 0 3 4 - 4 8 g/dL    Total Bilirubin 0 36 0 30 - 1 20 mg/dL    eGFR 41 ml/min/1 73sq m   Magnesium    Collection Time: 04/06/21  5:32 PM   Result Value Ref Range    Magnesium 2 0 1 6 - 2 6 mg/dL   TSH    Collection Time: 04/06/21  5:32 PM   Result Value Ref Range    TSH 3RD GENERATON 2 372 0 340 - 5 600 uIU/mL   Type and screen    Collection Time: 04/06/21  5:32 PM   Result Value Ref Range    ABO Grouping B     Rh Factor Positive     Antibody Screen Negative     Specimen Expiration Date [id number]    Troponin I    Collection Time: 04/06/21  5:32 PM   Result Value Ref Range    Troponin I <0 03 0 00 - 0 07 ng/mL   Iron Saturation %    Collection Time: 04/06/21  5:32 PM   Result Value Ref Range    Iron Saturation 17 %    TIBC 335 250 - 450 ug/dL    Iron 56 50 - 170 ug/dL   Ferritin    Collection Time: 04/06/21  5:32 PM   Result Value Ref Range    Ferritin 15 8 - 388 ng/mL   Hemoglobin and hematocrit, blood    Collection Time: 04/06/21 11:22 PM   Result Value Ref Range    Hemoglobin 7 2 (L) 11 5 - 15 4 g/dL    Hematocrit 23 0 (L) 34 8 - 46 1 %   Hemoglobin and hematocrit, blood    Collection Time: 04/06/21 11:22 PM   Result Value Ref Range    Hemoglobin 7 3 (L) 11 5 - 15 4 g/dL    Hematocrit 23 3 (L) 34 8 - 46 1 %   Protime-INR    Collection Time: 04/06/21 11:22 PM   Result Value Ref Range    Protime 10 9 9 5 - 12 1 seconds    INR 0 96 0 90 - 1 10   ABORh Recheck - Contact Blood Bank Prior to Collection    Collection Time: 04/06/21 11:24 PM   Result Value Ref Range    ABO Grouping B     Rh Factor Positive    CBC and differential    Collection Time: 04/07/21  4:45 AM   Result Value Ref Range    WBC 5 24 4 31 - 10 16 Thousand/uL    RBC 2 82 (L) 3 81 - 5 12 Million/uL    Hemoglobin 8 4 (L) 11 5 - 15 4 g/dL    Hematocrit 26 7 (L) 34 8 - 46 1 %    MCV 95 82 - 98 fL    MCH 29 8 26 8 - 34 3 pg    MCHC 31 5 31 4 - 37 4 g/dL    RDW 14 7 11 6 - 15 1 %    MPV 11 0 8 9 - 12 7 fL    Platelets [phone] Thousands/uL    Neutrophils Relative 52 43 - 75 %    Immat GRANS % 0 0 - 2 %    Lymphocytes Relative 36 14 - 44 %    Monocytes Relative 9 4 - 12 %    Eosinophils Relative 3 0 - 6 %    Basophils Relative 0 0 - 1 %    Neutrophils Absolute 2 71 1 85 - 7 62 Thousands/µL    Immature Grans Absolute 0 01 0 00 - 0 20 Thousand/uL    Lymphocytes Absolute 1 90 0 60 - 4 47 Thousands/µL    Monocytes Absolute 0 47 0 17 - 1 22 Thousand/µL    Eosinophils Absolute 0 13 0 00 - 0 61 Thousand/µL    Basophils Absolute 0 02 0 00 - 0 10 Thousands/µL   Basic metabolic panel    Collection Time: 04/07/21  4:45 AM   Result Value Ref Range    Sodium 145 133 - 145 mmol/L    Potassium 3 6 3 5 - 5 0 mmol/L    Chloride 109 (H) 96 - 108 mmol/L    CO2 29 22 - 33 mmol/L    ANION GAP 7 4 - 13 mmol/L    BUN 17 6 - 20 mg/dL    Creatinine 1 22 (H) 0 40 - 1 10 mg/dL    Glucose 88 65 - 140 mg/dL    Calcium 8 7 8 4 - 10 2 mg/dL    eGFR 41 ml/min/1 73sq m   Magnesium    Collection Time: 04/07/21  4:45 AM   Result Value Ref Range    Magnesium 2 0 1 6 - 2 6 mg/dL   Phosphorus    Collection Time: 04/07/21  4:45 AM   Result Value Ref Range    Phosphorus 3 7 2 5 - 5 0 mg/dL   Prepare Leukoreduced RBC: 1 Units    Collection Time: 04/07/21  6:30 AM   Result Value Ref Range    Unit Product Code [id number]     Unit Number [id number]     Unit ABO O     Unit RH POS     Crossmatch Compatible     Unit Dispense Status Presumed Trans        Imaging Studies: Mri Brain W Wo Contrast    Result Date: 3/26/2021  Narrative: MRI BRAIN WITH AND WITHOUT CONTRAST INDICATION: R32: Unspecified urinary incontinence R41 3: Other amnesia R42: Dizziness and giddiness Z74 09: Other reduced mobility  COMPARISON:  None  TECHNIQUE: Sagittal T1, axial T2, axial FLAIR, axial T1, axial Rhine, axial diffusion  Sagittal, axial T1 postcontrast   Axial bravo postcontrast with coronal reconstructions  IV Contrast:  7 mL of Gadobutrol injection (SINGLE-DOSE)  IMAGE QUALITY:   Diagnostic   FINDINGS: BRAIN PARENCHYMA:  There is no discrete mass, mass effect or midline shift  There is no intracranial hemorrhage  Normal posterior fossa  Diffusion imaging is unremarkable  Minimal white matter change suggestive of mild chronic microangiopathic change Postcontrast imaging of the brain demonstrates no abnormal enhancement  VENTRICLES:  Normal for the patient's age  SELLA AND PITUITARY GLAND:  Normal  ORBITS:  Normal  PARANASAL SINUSES:  Normal  VASCULATURE:  Evaluation of the major intracranial vasculature demonstrates appropriate flow voids  CALVARIUM AND SKULL BASE:  Normal  EXTRACRANIAL SOFT TISSUES:  Normal      Impression: No acute intracranial abnormality  Minimal white matter change suggestive of chronic microangiopathic changes  Workstation performed: [id number]     Vas Carotid Complete Study    Result Date: 3/25/2021  Narrative:  [facility] REPORT CLINICAL: Indications: Patient presents with frequent dizziness and multiple cardiovascular risk factors  Operative History: Cardiac electrophysiology procedure Risk Factors The patient has history of HTN, HLD and previous smoking (quit >10yrs ago)  Clinical Right Pressure:  137/76 mm Hg, Left Pressure:  138/77 mm Hg  FINDINGS:  Right        Impression  PSV  EDV (cm/s)  Direction of Flow  Ratio  Dist  ICA                 75          22                      0 69  Mid  ICA                  73          14                      0 67  Prox   ICA    1 - 49%      88          20                      0 81  Dist CCA                  60           6                            Mid CCA                  108          18                      1 51  Prox CCA                  72           9                            Ext Carotid              124          14                      1 15  Prox Vert                 38          10  Antegrade                 Subclavian               140           0                             Left         Impression  PSV  EDV (cm/s)  Direction of Flow  Ratio  Dist  ICA 61          13                      1 03  Mid  ICA                  79          17                      1 34  Prox  ICA    1 - 49%      66          15                      1 13  Dist CCA                  60          16                            Mid CCA                   59          18                      0 96  Prox CCA                  61          15                            Ext Carotid              109           8                      1 85  Prox Vert                 43           9  Antegrade                 Subclavian               181           5                               CONCLUSION: Impression RIGHT: There is <50% stenosis noted in the internal carotid artery  Plaque is heterogenous and irregular  Vertebral artery flow is antegrade  There is no significant subclavian artery disease  LEFT: There is <50% stenosis noted in the internal carotid artery  Plaque is heterogenous and irregular  Vertebral artery flow is antegrade  There is no significant subclavian artery disease  Internal carotid artery stenosis determination by consensus criteria from: Vanesa Hargrove et al  Carotid Artery Stenosis: Gray-Scale and Doppler US Diagnosis - Society of Radiologists in [address] [place] [address] [facility], Radiology 2003; [phone]  SIGNATURE: Electronically Signed by: Kristofer Loera MD, [facility] on 2021-03-25 05:39:17 PM      DATE: 7/8/2019  Surgeon: Destiny Calhoun MD    Procedure: Procedure(s):  FECAL MICROBIOTA TRANSPLANT    Post-Op Diagnosis Codes:  * Clostridium difficile infection [A49 8]    Condition at Discharge: Stable    Results and outcomes were reviewed with patient/family and questions answered  Discharge insructions were provided to patient on mobility limitations, signs and symptoms of potential complications, wound care, medicantion plan, treatment plan, and home safety  Patient Discharged to Home    Follow up instructions:   Findings:  Mild siigmoid diverticulosis was noted    Two small 3 mm polyps were noted in the descending colon  Internal hemorrhoids were noted  250 cc of Openbiome stool was instilled in the right colon  Recommendations:  1  Follow up with referring doctor  2  Could consider colonoscopy to remove 2 small descending colon polyps at later point (after 3-4 months)            Assessment/Plan: This is an 81 y/o female who presents with dizziness as well as melena and drop in hemoglobin  Hemoglobin was 10 9 on 03/26/2021 and then was 7 2 on admission  Patient is status post 1 unit of packed RBCs and hemoglobin is 8 4 this morning  Patient was not previously seen by our practice but reports that she had a colonoscopy within the past several years but denies ever having an upper endoscopy  Patient likely with upper GI source  Previous 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 was available in system which had shown mild sigmoid diverticulosis as well as 2 small 3 mm polyps and internal hemorrhoids  Patient's daughter then reports that she went and had polypectomy done thereafter by Dr James Jerome  Continue NPO  Aspirin has been held  Continue IV Protonix and patient currently on Protonix drip  EGD will be planned for today for evaluation of upper GI source which could be peptic ulcer disease as there was report of patient taking NSAIDs  Follow serial H&H  Thank you for the consultation  Case will be discussed with Dr Rema Singh

## 2021-04-07 NOTE — ASSESSMENT & PLAN NOTE
· Presented with worsening dizziness and lightheadedness  · History of chronic dizziness for more than 10 years  Denies vertigo  Patient does have multiple neurological symptoms such as loss of taste, loss of smell, bilateral hearing loss, bilateral ear fullness, urinary incontinence for many years  · Extensive workup done including MRI brain ruled out schwannoma and NPH, bilateral carotid duplex ultrasound, so far negative  · Patient reports that meclizine does not help with her dizziness  · She took 2 Advil this morning hoping to resolve dizziness  She also have history of bilateral knee arthritis  · Worsening dizziness in the setting of acute blood loss anemia  · Underlying etiology for chronic dizziness is unclear  · Recommended to follow-up with neurology  Reports neurology appointment in July

## 2021-04-07 NOTE — PLAN OF CARE
Problem: Potential for Falls  Goal: Patient will remain free of falls  Description: INTERVENTIONS:  - Assess patient frequently for physical needs  -  Identify cognitive and physical deficits and behaviors that affect risk of falls    -  [place] fall precautions as indicated by assessment   - Educate patient/family on patient safety including physical limitations  - Instruct patient to call for assistance with activity based on assessment  - Modify environment to reduce risk of injury  - Consider OT/PT consult to assist with strengthening/mobility  Outcome: Progressing     Problem: PAIN - ADULT  Goal: Verbalizes/displays adequate comfort level or baseline comfort level  Description: Interventions:  - Encourage patient to monitor pain and request assistance  - Assess pain using appropriate pain scale  - Administer analgesics based on type and severity of pain and evaluate response  - Implement non-pharmacological measures as appropriate and evaluate response  - Consider cultural and social influences on pain and pain management  - Notify physician/advanced practitioner if interventions unsuccessful or patient reports new pain  Outcome: Progressing     Problem: INFECTION - ADULT  Goal: Absence or prevention of progression during hospitalization  Description: INTERVENTIONS:  - Assess and monitor for signs and symptoms of infection  - Monitor lab/diagnostic results  - Monitor all insertion sites, i e  indwelling lines, tubes, and drains  - Monitor endotracheal if appropriate and nasal secretions for changes in amount and color  - [place] appropriate cooling/warming therapies per order  - Administer medications as ordered  - Instruct and encourage patient and family to use good hand hygiene technique  - Identify and instruct in appropriate isolation precautions for identified infection/condition  Outcome: Progressing  Goal: Absence of fever/infection during neutropenic period  Description: INTERVENTIONS:  - Monitor WBC    Outcome: Progressing     Problem: SAFETY ADULT  Goal: Patient will remain free of falls  Description: INTERVENTIONS:  - Assess patient frequently for physical needs  -  Identify cognitive and physical deficits and behaviors that affect risk of falls    -  [place] fall precautions as indicated by assessment   - Educate patient/family on patient safety including physical limitations  - Instruct patient to call for assistance with activity based on assessment  - Modify environment to reduce risk of injury  - Consider OT/PT consult to assist with strengthening/mobility  Outcome: Progressing  Goal: Maintain or return to baseline ADL function  Description: INTERVENTIONS:  -  Assess patient's ability to carry out ADLs; assess patient's baseline for ADL function and identify physical deficits which impact ability to perform ADLs (bathing, care of mouth/teeth, toileting, grooming, dressing, etc )  - Assess/evaluate cause of self-care deficits   - Assess range of motion  - Assess patient's mobility; develop plan if impaired  - Assess patient's need for assistive devices and provide as appropriate  - Encourage maximum independence but intervene and supervise when necessary  - Involve family in performance of ADLs  - Assess for home care needs following discharge   - Consider OT consult to assist with ADL evaluation and planning for discharge  - Provide patient education as appropriate  Outcome: Progressing  Goal: Maintain or return mobility status to optimal level  Description: INTERVENTIONS:  - Assess patient's baseline mobility status (ambulation, transfers, stairs, etc )    - Identify cognitive and physical deficits and behaviors that affect mobility  - Identify mobility aids required to assist with transfers and/or ambulation (gait belt, sit-to-stand, lift, walker, cane, etc )  - [place] fall precautions as indicated by assessment  - Record patient progress and toleration of activity level on Mobility SBAR; progress patient to next Phase/Stage  - Instruct patient to call for assistance with activity based on assessment  - Consider rehabilitation consult to assist with strengthening/weightbearing, etc   Outcome: Progressing     Problem: DISCHARGE PLANNING  Goal: Discharge to home or other facility with appropriate resources  Description: INTERVENTIONS:  - Identify barriers to discharge w/patient and caregiver  - Arrange for needed discharge resources and transportation as appropriate  - Identify discharge learning needs (meds, wound care, etc )  - Arrange for interpretive services to assist at discharge as needed  - Refer to Case Management Department for coordinating discharge planning if the patient needs post-hospital services based on physician/advanced practitioner order or complex needs related to functional status, cognitive ability, or social support system  Outcome: Progressing     Problem: Knowledge Deficit  Goal: Patient/family/caregiver demonstrates understanding of disease process, treatment plan, medications, and discharge instructions  Description: Complete learning assessment and assess knowledge base    Interventions:  - Provide teaching at level of understanding  - Provide teaching via preferred learning methods  Outcome: Progressing

## 2021-04-07 NOTE — ASSESSMENT & PLAN NOTE
· Presented with worsening dizziness and lightheadedness  · History of chronic dizziness for more than 10 years  Denies vertigo  Patient does have multiple neurological symptoms such as loss of taste, loss of smell, bilateral hearing loss, bilateral ear fullness, urinary incontinence for many years  · Extensive workup done including MRI brain ruled out schwannoma and NPH, bilateral carotid duplex ultrasound, so far negative  · Patient reports that meclizine does not help with her dizziness  · Worsening dizziness in the setting of acute blood loss anemia  · Underlying etiology for chronic dizziness is unclear        -Recommended to follow-up with neurology  Reports neurology appointment in July

## 2021-04-07 NOTE — INTERVAL H&P NOTE
H&P reviewed  After examining the patient I find no changes in the patients condition since the H&P had been written      Vitals:    04/07/21 1331   BP: 125/56   Pulse: 73   Resp: 15   Temp: 98 2 °F (36 8 °C)   SpO2: 99%

## 2021-04-07 NOTE — ASSESSMENT & PLAN NOTE
· Presented with black tarry stool for more than 1 week  Worsening dizziness for 2 weeks  She has chronic dizziness for more than 10 years  · Denies any obvious bleeding or bruising  Not on anticoagulation  She is taking aspirin 81 mg daily  · She took Advil 2 tablets on empty stomach this morning  She denies taking Advil or other NSAIDs on a regular basis  · She drinks 1 glass of wine every night  · Hemoglobin dropped 6 gram within 1 month  (14 g/dl to 8 g/dl); status post 1 RBC transfusion, hemoglobin has improved to 8 2  · FOBT positive 2 weeks ago  · Currently, vital signs are stable  Patient is euvolemic  CBC showed hemoglobin 8, hematocrit 24 5, CMP showed creatinine 1 23, BUN 20  EKG normal sinus rhythm  · Acute blood loss anemia likely due to GI bleed probably due to upper GI bleed in the setting of NSAID and alcohol use  · Colonoscopy in 2019 showed 2 polyps and diverticulosis  · GI consulted, on board, EGD to be done today    Plan:  · Continue NPO  · Monitor H&H Q 6 hourly  · Type and screen done; post 1 unit PRBC  · Continuing Protonix drip  · Monitor hemodynamics  · Will give IV fluid hydration and monitor volume status

## 2021-04-08 ENCOUNTER — ANESTHESIA EVENT (INPATIENT)
Dept: GASTROENTEROLOGY | Facility: HOSPITAL | Age: 86
DRG: 378 | End: 2021-04-08
Payer: COMMERCIAL

## 2021-04-08 LAB
ANION GAP SERPL CALCULATED.3IONS-SCNC: 8 MMOL/L (ref 4–13)
BASOPHILS # BLD AUTO: 0.02 THOUSANDS/ΜL (ref 0–0.1)
BASOPHILS NFR BLD AUTO: 0 % (ref 0–1)
BUN SERPL-MCNC: 14 MG/DL (ref 6–20)
CALCIUM SERPL-MCNC: 8.7 MG/DL (ref 8.4–10.2)
CHLORIDE SERPL-SCNC: 109 MMOL/L (ref 96–108)
CO2 SERPL-SCNC: 26 MMOL/L (ref 22–33)
CREAT SERPL-MCNC: 1.05 MG/DL (ref 0.4–1.1)
EOSINOPHIL # BLD AUTO: 0.17 THOUSAND/ΜL (ref 0–0.61)
EOSINOPHIL NFR BLD AUTO: 3 % (ref 0–6)
ERYTHROCYTE [DISTWIDTH] IN BLOOD BY AUTOMATED COUNT: 14.8 % (ref 11.6–15.1)
GFR SERPL CREATININE-BSD FRML MDRD: 49 ML/MIN/1.73SQ M
GLUCOSE SERPL-MCNC: 85 MG/DL (ref 65–140)
HCT VFR BLD AUTO: 27.2 % (ref 34.8–46.1)
HGB BLD-MCNC: 8.6 G/DL (ref 11.5–15.4)
IMM GRANULOCYTES # BLD AUTO: 0 THOUSAND/UL (ref 0–0.2)
IMM GRANULOCYTES NFR BLD AUTO: 0 % (ref 0–2)
LYMPHOCYTES # BLD AUTO: 1.26 THOUSANDS/ΜL (ref 0.6–4.47)
LYMPHOCYTES NFR BLD AUTO: 22 % (ref 14–44)
MCH RBC QN AUTO: 29.7 PG (ref 26.8–34.3)
MCHC RBC AUTO-ENTMCNC: 31.6 G/DL (ref 31.4–37.4)
MCV RBC AUTO: 94 FL (ref 82–98)
MONOCYTES # BLD AUTO: 0.42 THOUSAND/ΜL (ref 0.17–1.22)
MONOCYTES NFR BLD AUTO: 7 % (ref 4–12)
NEUTROPHILS # BLD AUTO: 3.9 THOUSANDS/ΜL (ref 1.85–7.62)
NEUTS SEG NFR BLD AUTO: 68 % (ref 43–75)
PLATELET # BLD AUTO: 188 THOUSANDS/UL (ref 149–390)
PMV BLD AUTO: 10.5 FL (ref 8.9–12.7)
POTASSIUM SERPL-SCNC: 3.9 MMOL/L (ref 3.5–5)
RBC # BLD AUTO: 2.9 MILLION/UL (ref 3.81–5.12)
SODIUM SERPL-SCNC: 143 MMOL/L (ref 133–145)
WBC # BLD AUTO: 5.77 THOUSAND/UL (ref 4.31–10.16)

## 2021-04-08 PROCEDURE — 99232 SBSQ HOSP IP/OBS MODERATE 35: CPT | Performed by: INTERNAL MEDICINE

## 2021-04-08 PROCEDURE — 97166 OT EVAL MOD COMPLEX 45 MIN: CPT

## 2021-04-08 PROCEDURE — 80048 BASIC METABOLIC PNL TOTAL CA: CPT | Performed by: INTERNAL MEDICINE

## 2021-04-08 PROCEDURE — 99232 SBSQ HOSP IP/OBS MODERATE 35: CPT | Performed by: PHYSICIAN ASSISTANT

## 2021-04-08 PROCEDURE — 85025 COMPLETE CBC W/AUTO DIFF WBC: CPT | Performed by: INTERNAL MEDICINE

## 2021-04-08 RX ADMIN — SUCRALFATE 1 G: 1 TABLET ORAL at 22:23

## 2021-04-08 RX ADMIN — ATORVASTATIN CALCIUM 20 MG: 20 TABLET, FILM COATED ORAL at 16:21

## 2021-04-08 RX ADMIN — SUCRALFATE 1 G: 1 TABLET ORAL at 16:21

## 2021-04-08 RX ADMIN — SUCRALFATE 1 G: 1 TABLET ORAL at 11:48

## 2021-04-08 RX ADMIN — SERTRALINE HYDROCHLORIDE 25 MG: 25 TABLET ORAL at 08:26

## 2021-04-08 RX ADMIN — PANTOPRAZOLE SODIUM 40 MG: 40 TABLET, DELAYED RELEASE ORAL at 16:21

## 2021-04-08 RX ADMIN — Medication 1 CAPSULE: at 16:21

## 2021-04-08 RX ADMIN — ACETAMINOPHEN 650 MG: 325 TABLET, FILM COATED ORAL at 09:50

## 2021-04-08 RX ADMIN — ACETAMINOPHEN 650 MG: 325 TABLET, FILM COATED ORAL at 20:27

## 2021-04-08 RX ADMIN — PANTOPRAZOLE SODIUM 40 MG: 40 TABLET, DELAYED RELEASE ORAL at 08:26

## 2021-04-08 RX ADMIN — SUCRALFATE 1 G: 1 TABLET ORAL at 08:25

## 2021-04-08 NOTE — OCCUPATIONAL THERAPY NOTE
Occupational Therapy Evaluation       04/08/21 1530   Restrictions/Precautions   Other Precautions Fall Risk  (Dizziness)   Pain Assessment   Pain Assessment Tool Pain Assessment not indicated - pt denies pain   Home Living   Type of [address] One level;Stairs to enter with rails  (2 [place])   Bathroom Shower/Tub Walk-in shower   Bathroom Toilet Standard   Bathroom Equipment Built-in shower seat   Home Equipment Cane   Additional Comments Patient presented to ED with dizzines; per patient has been having dizziness x 10 years however has been increasing lately   Prior Function   Level of [place] Independent with ADLs and functional mobility   Lives With Alone   Receives Help From Family   ADL Assistance Independent   IADLs Independent   Falls in the last 6 months 0   Comments Patient reports living alone, PTA independent in all ADLs and mobility without AD  Independent in iADLs and patient currently drives   ADL   Eating Assistance 7  Independent   Grooming Assistance 7  Independent   UB Bathing Assistance 7  [address] 5  Supervision/Setup   LB Dressing Deficit Don/doff R shoe;Don/doff L shoe  (While seated EOB)   Toileting Assistance  7  Independent   Bed Mobility   Supine to Sit 7  Independent   Transfers   Sit to Stand 5  Supervision   Stand to Sit 5  Supervision   Toilet transfer 5  Supervision   Additional items Assist x 1  (Ambulatory transfer, no AD)   Functional Mobility   Functional Mobility 5  Supervision   Additional Comments Patient ambulated short household distance in room to/from bathroom without AD; patient reports dizziness with activity and guarded at times, but able to complete functional activities   Patient reports she gets dizziness with ambulation at baseline   Balance   Static Sitting Good   Dynamic Sitting Good   Static Standing Fair +   Dynamic Standing Fair Activity Tolerance   Activity Tolerance Patient limited by fatigue   RUE Assessment   RUE Assessment WFL   LUE Assessment   LUE Assessment WFL   Cognition   Overall Cognitive Status WFL   Arousal/Participation Alert; Cooperative   Attention Within functional limits   Orientation Level Oriented X4   Memory Within functional limits   Following Commands Follows all commands and directions without difficulty   Assessment   Limitation Decreased ADL status; Decreased UE strength;Decreased Safe judgement during ADL;Decreased endurance;Decreased high-level ADLs; Decreased self-care trans   Prognosis Good   Assessment Patient evaluated by Occupational Therapy  Patient admitted with Acute blood loss anemia  The patients occupational profile, medical and therapy history includes a expanded review of medical and/or therapy records and additional review of physical, cognitive, or psychosocial history related to current functional performance  Comorbidities affecting functional mobility and ADLS include: HTN, depression, HLD, insomnia  Prior to admission, patient was independent with functional mobility without assistive device, independent with ADLS and independent with IADLS  The evaluation identifies the following performance deficits: weakness, impaired balance, decreased endurance, increased fall risk, new onset of impairment of functional mobility, decreased ADLS, decreased IADLS, decreased activity tolerance, decreased safety awareness and decreased strength, that result in activity limitations and/or participation restrictions  This evaluation requires clinical decision making of moderate complexity, because the patient may present with comorbidities that affect occupational performance and required minimal or moderate modification of tasks or assistance with the consideration of several treatment options    The Barthel Index was used as a functional outcome tool presenting with a score of 60, indicating moderate limitations of functional mobility and ADLS  The patient's raw score on the AM-PAC Daily Activity inpatient short form is 22, standardized score is 47 1, greater than 39 4  Patients at this level are likely to benefit from DC to home  Please refer to the recommendation of the Occupational Therapist for safe DC planning  Patient will benefit from skilled Occupational Therapy services to address above deficits and facilitate a safe return to prior level of function  Goals   Patient Goals To be able to go home   STG Time Frame 1-3   Short Term Goal  Goals established to promote patient goal of going home:  Patient will increase standing tolerance to 10 minutes during ADL task to decrease assistance level and decrease fall risk; Patient will tolerate 10 minutes of UE ROM/strengthening to increase general activity tolerance and performance in ADLS/IADLS; Patient will improve functional activity tolerance to 10 minutes of sustained functional tasks to increase participation in basic self-care and decrease assistance level;  Patient will be able to to verbalize understanding and perform energy conservation/proper body mechanics during ADLS and functional mobility at least 75% of the time with no cueing to decrease signs of fatigue and increase stamina to return to prior level of function; Patient will increase dynamic sitting balance to good to improve the ability to sit at edge of bed or on a chair for ADLS;  Patient will increase dynamic standing balance to fair+ to improve postural stability and decrease fall risk during standing ADLS and transfers      LTG Time Frame 3-7   Long Term Goal Patient will increase standing tolerance to 15 minutes during ADL task to decrease assistance level and decrease fall risk; Patient will increase functional mobility to and from bathroom with no assistive device independently to increase performance with ADLS and to use a toilet; Patient will tolerate 15 minutes of UE ROM/strengthening to increase general activity tolerance and performance in ADLS/IADLS; Patient will improve functional activity tolerance to 15 minutes of sustained functional tasks to increase participation in basic self-care and decrease assistance level;  Patient will be able to to verbalize understanding and perform energy conservation/proper body mechanics during ADLS and functional mobility at least 90% of the time with nocueing to decrease signs of fatigue and increase stamina to return to prior level of function; Patient will increase static/dynamic standing balance to good to improve postural stability and decrease fall risk during standing ADLS and transfers  Functional Transfer Goals   Pt Will Perform All Functional Transfers   (LTG independent)   ADL Goals   Pt Will Perform Bathing   (LTG independent)   Pt Will Perform LE Dressing   (LTG independent)   Plan   Treatment Interventions ADL retraining;Functional transfer training;UE strengthening/ROM; Endurance training;Patient/family training;Equipment evaluation/education; Compensatory technique education;Continued evaluation; Energy conservation; Activityengagement   Recommendation   OT Discharge Recommendation Return to previous environment with social support   AM-PAC Daily Activity Inpatient   Lower Body Dressing 3   Bathing 3   Toileting 4   Upper Body Dressing 4   Grooming 4   Eating 4   Daily Activity Raw Score 22   Daily Activity Standardized Score (Calc for Raw Score >=11) 47  1   AM-PAC Applied Cognition Inpatient   Following a Speech/Presentation 4   Understanding Ordinary Conversation 4   Taking Medications 4   Remembering Where Things Are Placed or Put Away 4   Remembering List of 4-5 Errands 4   Taking Care of Complicated Tasks 4   Applied Cognition Raw Score 24   Applied Cognition Standardized Score 62 21   Barthel Index   Feeding 10   Bathing 0   Grooming Score 5   Dressing Score 5   Bladder Score 10   Bowels Score 10   Toilet Use Score 5 Transfers (Bed/Chair) Score 10   Mobility (Level Surface) Score 0   Stairs Score 5   Barthel Index Score 60     Brad Gonzalez, OTR/L

## 2021-04-08 NOTE — ASSESSMENT & PLAN NOTE
· Presented with black tarry stool for more than 1 week  Worsening dizziness for 2 weeks  She has chronic dizziness for more than 10 years  · History of NSAIDs use, though denies chronic or excessive use  · Hemoglobin dropped 6 gram within 1 month  (14 g/dl to 8 g/dl); status post 1 RBC transfusion, hemoglobin has improved to 8 2  · FOBT positive 2 weeks ago  · Colonoscopy in 2019 showed 2 polyps and diverticulosis  · Gastroenterology following  · EGD:Multiple erosions and shallow ulcers noted in the stomach specially in the antrum  Mild oozing of blood from erosions in the proximal stomach also seen  Significant oozing of blood was noted from the spot immediately distal to the ampulla Vater    Laser anticoagulation done with argon laser  · Hemoglobin remaining stable post 1 unit PRBC and EGD    Plan:  · Regular diet  · Daily monitoring of hemoglobin  · Continue pantoprazole 40 mg BD oral  · Monitor hemodynamics  · follow-up biopsy results  · plan for repeat EGD on 04/09/2021  · no NSAIDs

## 2021-04-08 NOTE — PLAN OF CARE
Problem: HEMATOLOGIC - ADULT  Goal: Maintains hematologic stability  Description: INTERVENTIONS  - Assess for signs and symptoms of bleeding or hemorrhage  - Monitor labs  Outcome: Progressing     Problem: Potential for Falls  Goal: Patient will remain free of falls  Description: INTERVENTIONS:  - Assess patient frequently for physical needs  -  Identify cognitive and physical deficits and behaviors that affect risk of falls    -  [place] fall precautions as indicated by assessment   - Educate patient/family on patient safety including physical limitations  - Instruct patient to call for assistance with activity based on assessment  - Modify environment to reduce risk of injury  - Consider OT/PT consult to assist with strengthening/mobility  4/8/2021 0948 by Radha Colon RN  Outcome: Progressing  4/8/2021 0948 by Radha Colon RN  Outcome: Progressing

## 2021-04-08 NOTE — NURSING NOTE
Patient c/o dizziness while lying in bed  Pt states this is new for her as she normally would get dizzy only with ambulation  /65, HR 67  Pt in bed with appropriate fall precautions in place, pt's daughter at bedside, call bell within reach, medical resident made aware and to assess pt at bedside, awaiting further orders at this time

## 2021-04-08 NOTE — PROGRESS NOTES
Progress Note - Genevieve Settler 82 y o  female MRN: [id number]    Unit/Bed#: -01 Encounter: [id number]        Assessment/Plan: This is an 81 y/o female who presents with dizziness as well as melena and drop in hemoglobin  Hemoglobin was 10 9 on 03/26/2021 and then was 7 2 on admission  Currently was 8 6 this AM    Patient is status post 1 unit of packed RBCs on admission  Pt had EGD yesterday- findings as below, multiple erosions and shallow ulcers noted in the stomach specially in the antrum    Mild oozing of blood from erosions in the proximal stomach also seen  Significant oozing of blood was noted from the spot immediately distal to the ampulla Vater  It was difficult to visualize initially  Finally laser anti coagulation was done with argon laser  Complete hemostasis was obtained at the end of procedure  Previous 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 was available in system which had shown mild sigmoid diverticulosis as well as 2 small 3 mm polyps and internal hemorrhoids and then reports that she went and had polypectomy done thereafter by Dr James Jerome  Cont diet, PPI BID and Carafate- will need repeat EGD tomorrow to re-assess bleeding lesions  Spoke with resident as well as hospitalist        Subjective:   Pt currently lying in bed- she states that she had a bm this am which is becoming less dark  Pt states that she is tolerating a diet   Pt denies any abd pain, n/v     Objective:     Vitals: /66 (BP Location: Left arm)   Pulse 80   Temp 98 3 °F (36 8 °C) (Tympanic)   Resp 18   Ht 5' 2" (1 575 m)   Wt 76 2 kg (168 lb)   SpO2 95%   BMI 30 73 kg/m²       Physical Exam:  Gen-alert, nad  Heart-s1-s2  Lungs- CTA ant  Abd-+bs, NT, ND, no r/r/g       Lab, Imaging and other studies:   Recent Results (from the past 72 hour(s))   Fingerstick Glucose (POCT)    Collection Time: 04/06/21  4:09 PM   Result Value Ref Range    POC Glucose 100 65 - 140 mg/dl   ECG 12 lead    Collection Time: 04/06/21  4:20 PM   Result Value Ref Range    Ventricular Rate 81 BPM    Atrial Rate 81 BPM    WI Interval 148 ms    QRSD Interval 78 ms    QT Interval 374 ms    QTC Interval 434 ms    P [place] 68 degrees    QRS Axis -27 degrees    T Wave [place] 58 degrees   ECG 12 lead    Collection Time: 04/06/21  5:19 PM   Result Value Ref Range    Ventricular Rate 92 BPM    Atrial Rate 90 BPM    WI Interval 149 ms    QRSD Interval 85 ms    QT Interval 359 ms    QTC Interval 445 ms    P Axis 73 degrees    QRS Axis -19 degrees    T Wave Axis 62 degrees   CBC and differential    Collection Time: 04/06/21  5:32 PM   Result Value Ref Range    WBC 6 60 4 31 - 10 16 Thousand/uL    RBC 2 72 (L) 3 81 - 5 12 Million/uL    Hemoglobin 8 0 (L) 11 5 - 15 4 g/dL    Hematocrit 25 5 (L) 34 8 - 46 1 %    MCV 94 82 - 98 fL    MCH 29 4 26 8 - 34 3 pg    MCHC 31 4 31 4 - 37 4 g/dL    RDW 14 7 11 6 - 15 1 %    MPV 10 4 8 9 - 12 7 fL    Platelets 528 790 - 887 Thousands/uL    Neutrophils Relative 60 43 - 75 %    Immat GRANS % 0 0 - 2 %    Lymphocytes Relative 30 14 - 44 %    Monocytes Relative 8 4 - 12 %    Eosinophils Relative 2 0 - 6 %    Basophils Relative 0 0 - 1 %    Neutrophils Absolute 3 97 1 85 - 7 62 Thousands/µL    Immature Grans Absolute 0 01 0 00 - 0 20 Thousand/uL    Lymphocytes Absolute 1 95 0 60 - 4 47 Thousands/µL    Monocytes Absolute 0 52 0 17 - 1 22 Thousand/µL    Eosinophils Absolute 0 13 0 00 - 0 61 Thousand/µL    Basophils Absolute 0 02 0 00 - 0 10 Thousands/µL   Comprehensive metabolic panel    Collection Time: 04/06/21  5:32 PM   Result Value Ref Range    Sodium 142 133 - 145 mmol/L    Potassium 3 8 3 5 - 5 0 mmol/L    Chloride 107 96 - 108 mmol/L    CO2 25 22 - 33 mmol/L    ANION GAP 10 4 - 13 mmol/L    BUN 20 6 - 20 mg/dL    Creatinine 1 23 (H) 0 40 - 1 10 mg/dL    Glucose 120 65 - 140 mg/dL    Calcium 9 4 8 4 - 10 2 mg/dL    AST 17 15 - 41 U/L    ALT 15 5 - 54 U/L    Alkaline Phosphatase 40 5 35 - 140 U/L    Total Protein 6 4 6 4 - 8 3 g/dL    Albumin 4 0 3 4 - 4 8 g/dL    Total Bilirubin 0 36 0 30 - 1 20 mg/dL    eGFR 41 ml/min/1 73sq m   Magnesium    Collection Time: 04/06/21  5:32 PM   Result Value Ref Range    Magnesium 2 0 1 6 - 2 6 mg/dL   TSH    Collection Time: 04/06/21  5:32 PM   Result Value Ref Range    TSH 3RD GENERATON 2 372 0 340 - 5 600 uIU/mL   Type and screen    Collection Time: 04/06/21  5:32 PM   Result Value Ref Range    ABO Grouping B     Rh Factor Positive     Antibody Screen Negative     Specimen Expiration Date [id number]    Troponin I    Collection Time: 04/06/21  5:32 PM   Result Value Ref Range    Troponin I <0 03 0 00 - 0 07 ng/mL   Iron Saturation %    Collection Time: 04/06/21  5:32 PM   Result Value Ref Range    Iron Saturation 17 %    TIBC 335 250 - 450 ug/dL    Iron 56 50 - 170 ug/dL   Ferritin    Collection Time: 04/06/21  5:32 PM   Result Value Ref Range    Ferritin 15 8 - 388 ng/mL   Hemoglobin and hematocrit, blood    Collection Time: 04/06/21 11:22 PM   Result Value Ref Range    Hemoglobin 7 2 (L) 11 5 - 15 4 g/dL    Hematocrit 23 0 (L) 34 8 - 46 1 %   Hemoglobin and hematocrit, blood    Collection Time: 04/06/21 11:22 PM   Result Value Ref Range    Hemoglobin 7 3 (L) 11 5 - 15 4 g/dL    Hematocrit 23 3 (L) 34 8 - 46 1 %   Protime-INR    Collection Time: 04/06/21 11:22 PM   Result Value Ref Range    Protime 10 9 9 5 - 12 1 seconds    INR 0 96 0 90 - 1 10   ABORh Recheck - Contact Blood Bank Prior to Collection    Collection Time: 04/06/21 11:24 PM   Result Value Ref Range    ABO Grouping B     Rh Factor Positive    CBC and differential    Collection Time: 04/07/21  4:45 AM   Result Value Ref Range    WBC 5 24 4 31 - 10 16 Thousand/uL    RBC 2 82 (L) 3 81 - 5 12 Million/uL    Hemoglobin 8 4 (L) 11 5 - 15 4 g/dL    Hematocrit 26 7 (L) 34 8 - 46 1 %    MCV 95 82 - 98 fL    MCH 29 8 26 8 - 34 3 pg    MCHC 31 5 31 4 - 37 4 g/dL    RDW 14 7 11 6 - 15 1 %    MPV 11 0 8 9 - 12 7 fL    Platelets 600 467 - 300 Thousands/uL    Neutrophils Relative 52 43 - 75 %    Immat GRANS % 0 0 - 2 %    Lymphocytes Relative 36 14 - 44 %    Monocytes Relative 9 4 - 12 %    Eosinophils Relative 3 0 - 6 %    Basophils Relative 0 0 - 1 %    Neutrophils Absolute 2 71 1 85 - 7 62 Thousands/µL    Immature Grans Absolute 0 01 0 00 - 0 20 Thousand/uL    Lymphocytes Absolute 1 90 0 60 - 4 47 Thousands/µL    Monocytes Absolute 0 47 0 17 - 1 22 Thousand/µL    Eosinophils Absolute 0 13 0 00 - 0 61 Thousand/µL    Basophils Absolute 0 02 0 00 - 0 10 Thousands/µL   Basic metabolic panel    Collection Time: 04/07/21  4:45 AM   Result Value Ref Range    Sodium 145 133 - 145 mmol/L    Potassium 3 6 3 5 - 5 0 mmol/L    Chloride 109 (H) 96 - 108 mmol/L    CO2 29 22 - 33 mmol/L    ANION GAP 7 4 - 13 mmol/L    BUN 17 6 - 20 mg/dL    Creatinine 1 22 (H) 0 40 - 1 10 mg/dL    Glucose 88 65 - 140 mg/dL    Calcium 8 7 8 4 - 10 2 mg/dL    eGFR 41 ml/min/1 73sq m   Magnesium    Collection Time: 04/07/21  4:45 AM   Result Value Ref Range    Magnesium 2 0 1 6 - 2 6 mg/dL   Phosphorus    Collection Time: 04/07/21  4:45 AM   Result Value Ref Range    Phosphorus 3 7 2 5 - 5 0 mg/dL   Prepare Leukoreduced RBC: 1 Units    Collection Time: 04/07/21  6:30 AM   Result Value Ref Range    Unit Product Code M8765U37     Unit Number [id number]     Unit ABO O     Unit [facility] POS     Crossmatch Compatible     Unit Dispense Status Presumed Trans    Hemoglobin and hematocrit, blood    Collection Time: 04/07/21 12:24 PM   Result Value Ref Range    Hemoglobin 8 6 (L) 11 5 - 15 4 g/dL    Hematocrit 27 1 (L) 34 8 - 46 1 %   Hemoglobin and hematocrit, blood    Collection Time: 04/07/21  5:50 PM   Result Value Ref Range    Hemoglobin 8 1 (L) 11 5 - 15 4 g/dL    Hematocrit 25 6 (L) 34 8 - 46 1 %   Basic metabolic panel    Collection Time: 04/08/21  7:27 AM   Result Value Ref Range    Sodium 143 133 - 145 mmol/L    Potassium 3 9 3 5 - 5 0 mmol/L    Chloride 109 (H) 96 - 108 mmol/L    CO2 26 22 - 33 mmol/L    ANION GAP 8 4 - 13 mmol/L    BUN 14 6 - 20 mg/dL    Creatinine 1 05 0 40 - 1 10 mg/dL    Glucose 85 65 - 140 mg/dL    Calcium 8 7 8 4 - 10 2 mg/dL    eGFR 49 ml/min/1 73sq m   CBC and differential    Collection Time: 04/08/21  7:30 AM   Result Value Ref Range    WBC 5 77 4 31 - 10 16 Thousand/uL    RBC 2 90 (L) 3 81 - 5 12 Million/uL    Hemoglobin 8 6 (L) 11 5 - 15 4 g/dL    Hematocrit 27 2 (L) 34 8 - 46 1 %    MCV 94 82 - 98 fL    MCH 29 7 26 8 - 34 3 pg    MCHC 31 6 31 4 - 37 4 g/dL    RDW 14 8 11 6 - 15 1 %    MPV 10 5 8 9 - 12 7 fL    Platelets 626 969 - 651 Thousands/uL    Neutrophils Relative 68 43 - 75 %    Immat GRANS % 0 0 - 2 %    Lymphocytes Relative 22 14 - 44 %    Monocytes Relative 7 4 - 12 %    Eosinophils Relative 3 0 - 6 %    Basophils Relative 0 0 - 1 %    Neutrophils Absolute 3 90 1 85 - 7 62 Thousands/µL    Immature Grans Absolute 0 00 0 00 - 0 20 Thousand/uL    Lymphocytes Absolute 1 26 0 60 - 4 47 Thousands/µL    Monocytes Absolute 0 42 0 17 - 1 22 Thousand/µL    Eosinophils Absolute 0 17 0 00 - 0 61 Thousand/µL    Basophils Absolute 0 02 0 00 - 0 10 Thousands/µL   DATE OF SERVICE:  4/07/21     PHYSICIAN(S):  Vandana Hernandez MD - Attending Physician        INDICATION:  Acute blood loss anemia     POST-OP DIAGNOSIS:  See the impression below      PREPROCEDURE:  Informed consent was obtained for the procedure, including sedation  Risks of perforation, hemorrhage, adverse drug reaction and aspiration were discussed  The patient was placed in the left lateral decubitus position      Patient was explained about the risks and benefits of the procedure  Risks including but not limited to bleeding, infection, and perforation were explained in detail  Also explained about less than 100% sensitivity with the exam and other alternatives      DETAILS OF PROCEDURE:  Patient was taken to the procedure room where a time out was performed to confirm correct patient and correct procedure  The patient underwent monitored anesthesia care, which was administered by an anesthesia professional  The patient's blood pressure, heart rate, level of consciousness, respirations and oxygen were monitored throughout the procedure  The scope was advanced to the second part of the duodenum  Retroflexion was performed in the fundus  The patient's estimated blood loss was moderate (5+ mL)  The procedure was not difficult  The patient tolerated the procedure well  There were no apparent complications  There was bleeding from erosions and gastritis on entry to the stomach  The also active bleeding in 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 which was cauterized       ANESTHESIA INFORMATION:  ASA: IV  Anesthesia Type: IV Sedation with Anesthesia     FINDINGS:  · Medium sliding hiatal hernia (type I hiatal hernia) without Sharrell Plan lesions present - GE junction 36 cm from the incisors, diaphragmatic impression 40 cm from the incisors  · Moderate, patchy ulcerated mucosa with erosion in the antrum; performed cold forceps biopsy  · Mild, localized erosion in the fundus of the stomach  · Abnormal mucosa in the duodenum and major papilla; bleeding occurred before intervention; induced coagulation with argon plasma coagulation  There was an erosion noted right underneath ampulla of Vater  This was actively oozing blood  It was very difficult area to actually stabilize laser catheter and treat  Some mucosal burn happened without intention because of scope moving because of patient gagging and breathing etc   Finally the bleeding spot was adequately coagulated  No bleeding at the end of procedure         SPECIMENS:  ID Type Source Tests Collected by Time Destination   1 : bx antrum r/o h pylori Tissue Stomach TISSUE EXAM Elvira Chi MD 4/7/2021  2:44 PM           IMPRESSION:  1  Multiple erosions and shallow ulcers noted in the stomach specially in the antrum    2  Mild oozing of blood from erosions in the proximal stomach also seen  3  Significant oozing of blood was noted from the spot immediately distal to the ampulla Vater  It was difficult to visualize initially  Finally laser anti coagulation was done with argon laser  Complete hemostasis was obtained at the end of procedure        RECOMMENDATION:  1  Check biopsy  2  Treat with PPI and Carafate  3  Repeat endoscopy in a few days to reassess lesions  4  Avoid aspirin or other antiplatelet agents at this time

## 2021-04-08 NOTE — PROGRESS NOTES
[facility]  68   Progress Note - Lo Rodriguez 1935, 82 y o  female MRN: [id number]  Unit/Bed#: -01 Encounter: [id number]  Primary Care Provider: Marek Perla MD   Date and time admitted to hospital: 4/6/2021  5:13 PM    * Acute blood loss anemia  Assessment & Plan  · Presented with black tarry stool for more than 1 week  Worsening dizziness for 2 weeks  She has chronic dizziness for more than 10 years  · History of NSAIDs use, though denies chronic or excessive use  · Hemoglobin dropped 6 gram within 1 month  (14 g/dl to 8 g/dl); status post 1 RBC transfusion, hemoglobin has improved to 8 2  · FOBT positive 2 weeks ago  · Colonoscopy in 2019 showed 2 polyps and diverticulosis  · Gastroenterology following  · EGD:Multiple erosions and shallow ulcers noted in the stomach specially in the antrum  Mild oozing of blood from erosions in the proximal stomach also seen  Significant oozing of blood was noted from the spot immediately distal to the ampulla Vater  Laser anticoagulation done with argon laser  · Hemoglobin remaining stable post 1 unit PRBC and EGD    Plan:  · Regular diet  · Daily monitoring of hemoglobin  · Continue pantoprazole 40 mg BD oral  · Monitor hemodynamics  · follow-up biopsy results  · plan for repeat EGD on 04/09/2021  · no NSAIDs    Hyperlipidemia  Assessment & Plan  · Continue atorvastatin starting from tomorrow    Hypertension  Assessment & Plan  · History of hypertension on lisinopril and amlodipine at home  · Continue continuing to hold home medications  · Monitor per unit protocol    Dizziness  Assessment & Plan  · Presented with worsening dizziness and lightheadedness  · History of chronic dizziness for more than 10 years  Denies vertigo  Patient does have multiple neurological symptoms such as loss of taste, loss of smell, bilateral hearing loss, bilateral ear fullness, urinary incontinence for many years    · Extensive workup done including MRI brain ruled out schwannoma and NPH, bilateral carotid duplex ultrasound, so far negative  · Patient reports that meclizine does not help with her dizziness  · Worsening dizziness in the setting of acute blood loss anemia  · Underlying etiology for chronic dizziness is unclear        -Recommended to follow-up with neurology  Reports neurology appointment in July  VTE Pharmacologic Prophylaxis:   Pharmacologic: Enoxaparin (Lovenox)  Mechanical VTE Prophylaxis in Place: Yes    Discussions with Specialists or Other Care Team Provider:  Gastroenterology    Education and Discussions with Family / Patient:  Extensive discussion held with both of patient's daughtersNatalia via phone    Current Length of Stay: 2 day(s)    Current Patient Status: Inpatient     Discharge Plan / Estimated Discharge Date:  Pending    Code Status: Level 1 - Full Code      Subjective:   Patient was seen and examined by me at bedside  Communicated clearly  No particular overnight event reported  Hemodynamically stable and afebrile  Patient offers no new complaints      Objective:     Vitals:   Temp (24hrs), Av 6 °F (37 °C), Min:98 °F (36 7 °C), Max:99 4 °F (37 4 °C)    Temp:  [98 °F (36 7 °C)-99 4 °F (37 4 °C)] 99 4 °F (37 4 °C)  HR:  [67-97] 97  Resp:  [16-19] 18  BP: (128-153)/(56-73) 145/64  SpO2:  [92 %-95 %] 94 %  Body mass index is 30 73 kg/m²  Input and Output Summary (last 24 hours): Intake/Output Summary (Last 24 hours) at 2021 1646  Last data filed at 2021 1601  Gross per 24 hour   Intake 920 ml   Output --   Net 920 ml       Physical Exam:     Physical Exam  Constitutional:       Appearance: Normal appearance  HENT:      Head: Normocephalic and atraumatic  Eyes:      General: No scleral icterus  Right eye: No discharge  Left eye: No discharge  Neck:      Musculoskeletal: Normal range of motion  Cardiovascular:      Rate and Rhythm: Normal rate and regular rhythm  Pulses: Normal pulses  Heart sounds: Normal heart sounds  Pulmonary:      Effort: Pulmonary effort is normal       Breath sounds: Normal breath sounds  Abdominal:      General: Bowel sounds are normal  There is no distension  Palpations: Abdomen is soft  Tenderness: There is no abdominal tenderness  Musculoskeletal: Normal range of motion  Skin:     General: Skin is warm and dry  Neurological:      General: No focal deficit present  Mental Status: She is alert and oriented to person, place, and time  Psychiatric:         Mood and Affect: Mood normal          Thought Content: Thought content normal              Additional Data:     Labs:    Results from last 7 days   Lab Units 04/08/21  0730   WBC Thousand/uL 5 77   HEMOGLOBIN g/dL 8 6*   HEMATOCRIT % 27 2*   PLATELETS Thousands/uL 188   NEUTROS PCT % 68   LYMPHS PCT % 22   MONOS PCT % 7   EOS PCT % 3     Results from last 7 days   Lab Units 04/08/21  0727  04/06/21  1732   POTASSIUM mmol/L 3 9   < > 3 8   CHLORIDE mmol/L 109*   < > 107   CO2 mmol/L 26   < > 25   BUN mg/dL 14   < > 20   CREATININE mg/dL 1 05   < > 1 23*   CALCIUM mg/dL 8 7   < > 9 4   ALK PHOS U/L  --   --  40 5   ALT U/L  --   --  15   AST U/L  --   --  17    < > = values in this interval not displayed  Results from last 7 days   Lab Units 04/06/21  2322   INR  0 96       * I Have Reviewed All Lab Data Listed Above  * Additional Pertinent Lab Tests Reviewed:  All Labs Within Last 24 Hours Reviewed    Imaging:    Imaging Reports Reviewed Today Include:  EGD  Imaging Personally Reviewed by Myself Includes:  None    Recent Cultures (last 7 days):           Last 24 Hours Medication List:   Current Facility-Administered Medications   Medication Dose Route Frequency Provider Last Rate    acetaminophen  650 mg Oral Q6H PRN Estrella Vera MD      atorvastatin  20 mg Oral Daily With Lizeth Cuadra MD      b complex-vitamin C-folic acid  1 capsule Oral Daily With Dinner Ei MD Bindu      ondansetron  4 mg Intravenous Q6H PRN Sanam Rodriguez MD      pantoprazole  40 mg Oral BID AC Abdirashid Tyson MD      sertraline  25 mg Oral QAM Sanam Rodriguez MD      sucralfate  1 g Oral 4x Daily (AC & HS) Abdirashid Tyson MD          Today, Patient Was Seen By: Abdirashid Tyson MD    ** Please Note: This note has been constructed using a voice recognition system   **

## 2021-04-08 NOTE — ASSESSMENT & PLAN NOTE
· History of hypertension on lisinopril and amlodipine at home  · Continue continuing to hold home medications  · Monitor per unit protocol

## 2021-04-09 ENCOUNTER — APPOINTMENT (INPATIENT)
Dept: GASTROENTEROLOGY | Facility: HOSPITAL | Age: 86
DRG: 378 | End: 2021-04-09
Payer: COMMERCIAL

## 2021-04-09 ENCOUNTER — ANESTHESIA (INPATIENT)
Dept: GASTROENTEROLOGY | Facility: HOSPITAL | Age: 86
DRG: 378 | End: 2021-04-09
Payer: COMMERCIAL

## 2021-04-09 VITALS
WEIGHT: 168 LBS | DIASTOLIC BLOOD PRESSURE: 68 MMHG | RESPIRATION RATE: 16 BRPM | SYSTOLIC BLOOD PRESSURE: 142 MMHG | HEART RATE: 82 BPM | TEMPERATURE: 97.9 F | BODY MASS INDEX: 30.91 KG/M2 | OXYGEN SATURATION: 92 % | HEIGHT: 62 IN

## 2021-04-09 LAB
BASOPHILS # BLD AUTO: 0.01 THOUSANDS/ΜL (ref 0–0.1)
BASOPHILS NFR BLD AUTO: 0 % (ref 0–1)
EOSINOPHIL # BLD AUTO: 0.15 THOUSAND/ΜL (ref 0–0.61)
EOSINOPHIL NFR BLD AUTO: 3 % (ref 0–6)
ERYTHROCYTE [DISTWIDTH] IN BLOOD BY AUTOMATED COUNT: 14.5 % (ref 11.6–15.1)
HCT VFR BLD AUTO: 27 % (ref 34.8–46.1)
HGB BLD-MCNC: 8.6 G/DL (ref 11.5–15.4)
IMM GRANULOCYTES # BLD AUTO: 0 THOUSAND/UL (ref 0–0.2)
IMM GRANULOCYTES NFR BLD AUTO: 0 % (ref 0–2)
LYMPHOCYTES # BLD AUTO: 1.42 THOUSANDS/ΜL (ref 0.6–4.47)
LYMPHOCYTES NFR BLD AUTO: 25 % (ref 14–44)
MCH RBC QN AUTO: 29.9 PG (ref 26.8–34.3)
MCHC RBC AUTO-ENTMCNC: 31.9 G/DL (ref 31.4–37.4)
MCV RBC AUTO: 94 FL (ref 82–98)
MONOCYTES # BLD AUTO: 0.51 THOUSAND/ΜL (ref 0.17–1.22)
MONOCYTES NFR BLD AUTO: 9 % (ref 4–12)
NEUTROPHILS # BLD AUTO: 3.6 THOUSANDS/ΜL (ref 1.85–7.62)
NEUTS SEG NFR BLD AUTO: 63 % (ref 43–75)
PLATELET # BLD AUTO: 181 THOUSANDS/UL (ref 149–390)
PMV BLD AUTO: 11.2 FL (ref 8.9–12.7)
RBC # BLD AUTO: 2.88 MILLION/UL (ref 3.81–5.12)
WBC # BLD AUTO: 5.69 THOUSAND/UL (ref 4.31–10.16)

## 2021-04-09 PROCEDURE — 88305 TISSUE EXAM BY PATHOLOGIST: CPT | Performed by: PATHOLOGY

## 2021-04-09 PROCEDURE — 99239 HOSP IP/OBS DSCHRG MGMT >30: CPT | Performed by: INTERNAL MEDICINE

## 2021-04-09 PROCEDURE — 85025 COMPLETE CBC W/AUTO DIFF WBC: CPT | Performed by: INTERNAL MEDICINE

## 2021-04-09 PROCEDURE — 43239 EGD BIOPSY SINGLE/MULTIPLE: CPT | Performed by: INTERNAL MEDICINE

## 2021-04-09 PROCEDURE — 0DB38ZX EXCISION OF LOWER ESOPHAGUS, VIA NATURAL OR ARTIFICIAL OPENING ENDOSCOPIC, DIAGNOSTIC: ICD-10-PCS | Performed by: INTERNAL MEDICINE

## 2021-04-09 RX ORDER — LIDOCAINE HYDROCHLORIDE 10 MG/ML
INJECTION, SOLUTION EPIDURAL; INFILTRATION; INTRACAUDAL; PERINEURAL AS NEEDED
Status: DISCONTINUED | OUTPATIENT
Start: 2021-04-09 | End: 2021-04-09

## 2021-04-09 RX ORDER — FERROUS SULFATE TAB EC 324 MG (65 MG FE EQUIVALENT) 324 (65 FE) MG
324 TABLET DELAYED RESPONSE ORAL
Qty: 56 TABLET | Refills: 0 | Status: SHIPPED | OUTPATIENT
Start: 2021-04-09 | End: 2021-05-05 | Stop reason: SDUPTHER

## 2021-04-09 RX ORDER — PROPOFOL 10 MG/ML
INJECTION, EMULSION INTRAVENOUS CONTINUOUS PRN
Status: DISCONTINUED | OUTPATIENT
Start: 2021-04-09 | End: 2021-04-09

## 2021-04-09 RX ORDER — PROPOFOL 10 MG/ML
INJECTION, EMULSION INTRAVENOUS AS NEEDED
Status: DISCONTINUED | OUTPATIENT
Start: 2021-04-09 | End: 2021-04-09

## 2021-04-09 RX ORDER — SODIUM CHLORIDE, SODIUM LACTATE, POTASSIUM CHLORIDE, CALCIUM CHLORIDE 600; 310; 30; 20 MG/100ML; MG/100ML; MG/100ML; MG/100ML
INJECTION, SOLUTION INTRAVENOUS CONTINUOUS PRN
Status: DISCONTINUED | OUTPATIENT
Start: 2021-04-09 | End: 2021-04-09

## 2021-04-09 RX ORDER — SUCRALFATE 1 G/1
1 TABLET ORAL
Qty: 112 TABLET | Refills: 0 | Status: SHIPPED | OUTPATIENT
Start: 2021-04-09 | End: 2021-05-05

## 2021-04-09 RX ORDER — PANTOPRAZOLE SODIUM 40 MG/1
40 TABLET, DELAYED RELEASE ORAL
Qty: 56 TABLET | Refills: 0 | Status: SHIPPED | OUTPATIENT
Start: 2021-04-09 | End: 2021-05-05

## 2021-04-09 RX ADMIN — LIDOCAINE HYDROCHLORIDE 50 MG: 10 INJECTION, SOLUTION EPIDURAL; INFILTRATION; INTRACAUDAL; PERINEURAL at 12:33

## 2021-04-09 RX ADMIN — PROPOFOL 80 MG: 10 INJECTION, EMULSION INTRAVENOUS at 12:33

## 2021-04-09 RX ADMIN — SODIUM CHLORIDE, SODIUM LACTATE, POTASSIUM CHLORIDE, AND CALCIUM CHLORIDE: .6; .31; .03; .02 INJECTION, SOLUTION INTRAVENOUS at 12:23

## 2021-04-09 RX ADMIN — PROPOFOL 100 MCG/KG/MIN: 10 INJECTION, EMULSION INTRAVENOUS at 12:33

## 2021-04-09 NOTE — ASSESSMENT & PLAN NOTE
· History of hypertension on lisinopril and amlodipine at home  · Continue home medications of discharge  · Monitor per unit protocol

## 2021-04-09 NOTE — INTERVAL H&P NOTE
H&P reviewed  After examining the patient I find no changes in the patients condition since the H&P had been written      Vitals:    04/09/21 1159   BP: 151/67   Pulse: 70   Resp: 18   Temp: 98 °F (36 7 °C)   SpO2: 96%

## 2021-04-09 NOTE — NURSING NOTE
Patient returned to room 329 via w/c s/p EGD  VSS, pt has no complaints at this time, pt in room bed with appropriate fall precautions in place and call bell within reach

## 2021-04-09 NOTE — CASE MANAGEMENT
CM rounded with pt Tx team and Attending informed CM pt is expected to DC today  PT informed CM that pt dtr has been requesting VNA services  CM to follow up with pt

## 2021-04-09 NOTE — PLAN OF CARE
Problem: Potential for Falls  Goal: Patient will remain free of falls  Description: INTERVENTIONS:  - Assess patient frequently for physical needs  -  Identify cognitive and physical deficits and behaviors that affect risk of falls    -  [place] fall precautions as indicated by assessment   - Educate patient/family on patient safety including physical limitations  - Instruct patient to call for assistance with activity based on assessment  - Modify environment to reduce risk of injury  - Consider OT/PT consult to assist with strengthening/mobility  Outcome: Progressing     Problem: HEMATOLOGIC - ADULT  Goal: Maintains hematologic stability  Description: INTERVENTIONS  - Assess for signs and symptoms of bleeding or hemorrhage  - Monitor labs  -Educate pt on procedures as ordered  Outcome: Progressing

## 2021-04-09 NOTE — DISCHARGE INSTRUCTIONS
Diet for Stomach Ulcers and Gastritis   WHAT YOU NEED TO KNOW:   A diet for stomach ulcers and gastritis is a meal plan that limits foods that irritate your stomach  Certain foods may worsen symptoms such as stomach pain, bloating, heartburn, or indigestion  DISCHARGE INSTRUCTIONS:   Foods to limit or avoid:  You may need to avoid acidic, spicy, or high-fat foods  Not all foods affect everyone the same way  You will need to learn which foods worsen your symptoms and limit those foods  The following are some foods that may worsen ulcer or gastritis symptoms:  · Beverages:      ? Whole milk and chocolate milk    ? Hot cocoa and cola    ? Any beverage with caffeine    ? Regular and decaffeinated coffee    ? Peppermint and spearmint tea    ? Green and black tea, with or without caffeine    ? Orange and grapefruit juices    ? Drinks that contain alcohol    · Spices and seasonings:      ? Black and red pepper    ? Chili powder    ? Mustard seed and nutmeg    · Other foods:      ? Dairy foods made from whole milk or cream    ? Chocolate    ? Spicy or strongly flavored cheeses, such as jalapeno or black pepper    ? Highly seasoned, high-fat meats, such as sausage, salami, adair, ham, and cold cuts    ? Hot chiles and peppers    ? Tomato products, such as tomato paste, tomato sauce, or tomato juice    Foods to include:  Eat a variety of healthy foods from all the food groups  Eat fruits, vegetables, whole grains, and fat-free or low-fat dairy foods  Whole grains include whole-wheat breads, cereals, pasta, and brown rice  Choose lean meats, poultry (chicken and turkey), fish, beans, eggs, and nuts  A healthy meal plan is low in unhealthy fats, salt, and added sugar  Healthy fats include olive oil and canola oil  Ask your dietitian for more information about a healthy diet  Other helpful guidelines:   · Do not eat right before bedtime  Stop eating at least 2 hours before bedtime  · Eat small, frequent meals    Your stomach may tolerate small, frequent meals better than large meals  © Copyright [address] Information is for End User's use only and may not be sold, redistributed or otherwise used for commercial purposes  All illustrations and images included in CareNotes® are the copyrighted property of A D A M , Inc  or Dequan Tineo  The above information is an  only  It is not intended as medical advice for individual conditions or treatments  Talk to your doctor, nurse or pharmacist before following any medical regimen to see if it is safe and effective for you  Pantoprazole (By mouth)   Pantoprazole (pan-TOE-pra-zole)  Treats gastroesophageal reflux disease (GERD), a damaged esophagus, and conditions that cause your stomach to make too much acid, including Zollinger-Phan syndrome  This medicine is a proton pump inhibitor (PPI)  Brand Name(s): Protonix   There may be other brand names for this medicine  When This Medicine Should Not Be Used: This medicine is not right for everyone  Do not use it if you had an allergic reaction to pantoprazole or similar medicines  How to Use This Medicine:   Packet, Tablet, Delayed Release Tablet, Long Acting Tablet  · Your doctor will tell you how much medicine to use  Do not use more than directed  · Delayed-release tablet: Swallow the tablet whole  Do not crush, break, or chew it  · Delayed-release packet: Take the medicine mixed in apple juice or applesauce at least 30 minutes before a meal  It may also be given using a nasogastric tube when mixed in apple juice only  ? To prepare with applesauce:   § Mix the packet contents with 1 teaspoon of applesauce  Do not mix with water or other liquids or food  Do not divide the packet contents to make smaller doses  § Swallow the mixture within 10 minutes after you mix it  Do not chew or crush the granules    § Sip some water after you take the mixture to make sure you swallow all of the medicine  ? To prepare with apple juice:   § Mix the packet contents with 1 teaspoon of apple juice in a small cup  Do not divide the packet contents to make smaller doses  § Stir for 5 seconds and drink the mixture immediately  Do not chew or crush the granules  § To make sure you get all of the medicine, add more apple juice to the cup  Drink it immediately  ? To prepare for a feeding tube:   § Pour the packet contents in a 2-ounce (60 milliliter [mL]) catheter-tip syringe  § Add 10 mL of apple juice to the syringe  Add the mixture to the tube  Gently tap or shake the barrel of the syringe to help empty it  § Add 10 mL of apple juice to the syringe and put it in the tube  Do this at least 2 times  There should be no granules left in the syringe  · This medicine should come with a Medication Guide  Ask your pharmacist for a copy if you do not have one  · Missed dose: Take a dose as soon as you remember  If it is almost time for your next dose, wait until then and take a regular dose  Do not take extra medicine to make up for a missed dose  · Store the medicine in a closed container at room temperature, away from heat, moisture, and direct light  Drugs and Foods to Avoid:   Ask your doctor or pharmacist before using any other medicine, including over-the-counter medicines, vitamins, and herbal products  · Do not use pantoprazole together with medicine that contains rilpivirine  · Some medicines can affect how pantoprazole works  Tell your doctor if you are using any of the following:   ? Ampicillin, atazanavir, dasatinib, digoxin, erlotinib, itraconazole, ketoconazole, methotrexate, mycophenolate mofetil, nelfinavir, nilotinib, saquinavir  ? Blood thinner (including warfarin)  ? Diuretic (water pill)  ? Iron supplements  Warnings While Using This Medicine:   · Tell your doctor if you are pregnant or breastfeeding, or if you have kidney disease, liver disease, lupus, or osteoporosis    · This medicine may cause the following problems:  ? Kidney problems  ? Increased risk of broken bones in the hip, wrist, or spine (more likely if used several times per day or longer than 1 year)  ? Lupus  ? Fundic gland polyps (abnormal growth in the upper part of your stomach)  · This medicine can cause diarrhea  Call your doctor if the diarrhea becomes severe, does not stop, or is bloody  Do not take any medicine to stop diarrhea until you have talked to your doctor  Diarrhea can occur 2 months or more after you stop taking this medicine  · Tell any doctor or dentist who treats you that you are using this medicine  This medicine may affect certain medical test results  · Your doctor will do lab tests at regular visits to check on the effects of this medicine  Keep all appointments  · Keep all medicine out of the reach of children  Never share your medicine with anyone  Possible Side Effects While Using This Medicine:   Call your doctor right away if you notice any of these side effects:  · Allergic reaction: Itching or hives, swelling in your face or hands, swelling or tingling in your mouth or throat, chest tightness, trouble breathing  · Blistering, peeling, red skin rash  · Fever, joint pain, swelling in your body, unusual weight gain, change in how much or how often you urinate  · Joint pain, rash on your cheeks or arms that gets worse in the sun  · Seizures, dizziness, uneven heartbeat, muscle cramps or twitching  · Severe diarrhea, stomach cramp or pain, nausea, vomiting, weight loss  If you notice these less serious side effects, talk with your doctor:   · Headache  If you notice other side effects that you think are caused by this medicine, tell your doctor  Call your doctor for medical advice about side effects  You may report side effects to FDA at 4-894-FDA-9546  © Copyright [address] Information is for End User's use only and may not be sold, redistributed or otherwise used for commercial purposes    The above information is an  only  It is not intended as medical advice for individual conditions or treatments  Talk to your doctor, nurse or pharmacist before following any medical regimen to see if it is safe and effective for you  Sucralfate (Carafate) - (By mouth)   Why this medicine is used:   Treats ulcers  Contact a nurse or doctor right away if you have:  · Chest pain, trouble breathing, coughing up blood  · Increased hunger or thirst, change in how much or how often you urinate, unusual weight loss  · Numbness or weakness in your arm or leg, or on one side of your body  · Pain in your lower leg (calf)  · Sudden or severe headache, problems with vision, speech, or walking  · Rash, hives, itching, swelling of the face or mouth     Common side effects:  · Constipation, stomach upset or cramps, passing gas, diarrhea  · Dry mouth, dizziness  © Copyright [address] Information is for End User's use only and may not be sold, redistributed or otherwise used for commercial purposes  Upper Endoscopy   WHAT YOU NEED TO KNOW:   An upper endoscopy is also called an upper gastrointestinal (GI) endoscopy, or an esophagogastroduodenoscopy (EGD)  You may feel bloated, gassy, or have some abdominal discomfort after your procedure  Your throat may be sore for 24 to 36 hours  You may burp or pass gas from air that is still inside your body  DISCHARGE INSTRUCTIONS:   Call 911 if:   · You have sudden chest pain or trouble breathing  Seek care immediately if:   · You feel dizzy or faint  · You have trouble swallowing  · You have severe throat pain  · Your bowel movements are very dark or black  · Your abdomen is hard and firm and you have severe pain  · You vomit blood  Contact your healthcare provider if:   · You feel full or bloated and cannot burp or pass gas  · You have not had a bowel movement for 3 days after your procedure      · You have neck pain     · You have a fever or chills  · You have nausea or are vomiting  · You have a rash or hives  · You have questions or concerns about your endoscopy  Relieve a sore throat:  Suck on throat lozenges or crushed ice  Gargle with a small amount of warm salt water  Mix 1 teaspoon of salt and 1 cup of warm water to make salt water  Relieve gas and discomfort from bloating:  Lie on your right side with a heating pad on your abdomen  Take short walks to help pass gas  Eat small meals until bloating is relieved  Rest after your procedure:  Do not drive or make important decisions until the day after your procedure  Return to your normal activity as directed  You can usually return to work the day after your procedure  Follow up with your healthcare provider as directed:  Write down your questions so you remember to ask them during your visits  © Copyright [address] Information is for End User's use only and may not be sold, redistributed or otherwise used for commercial purposes  All illustrations and images included in CareNotes® are the copyrighted property of A D [employer]  or [facility] Jun Brunson   The above information is an  only  It is not intended as medical advice for individual conditions or treatments  Talk to your doctor, nurse or pharmacist before following any medical regimen to see if it is safe and effective for you

## 2021-04-09 NOTE — DISCHARGE SUMMARY
[facility]  68   Discharge- Tanisha Thurman 1935, 82 y o  female MRN: [id number]  Unit/Bed#: -01 Encounter: [id number]  Primary Care Provider: Genevieve Barnes MD   Date and time admitted to hospital: 4/6/2021  5:13 PM    * Acute blood loss anemia  Assessment & Plan  · Presented with black tarry stool for more than 1 week  Worsening dizziness for 2 weeks  She has chronic dizziness for more than 10 years  · History of NSAIDs use, though denies chronic or excessive use  · Hemoglobin dropped 6 gram within 1 month  (14 g/dl to 8 g/dl); status post 1 RBC transfusion, hemoglobin has improved to 8 2  · FOBT positive 2 weeks ago  · Colonoscopy in 2019 showed 2 polyps and diverticulosis  · Gastroenterology following  · EGD on 04/07/2021:Multiple erosions and shallow ulcers noted in the stomach specially in the antrum  Mild oozing of blood from erosions in the proximal stomach also seen  Significant oozing of blood was noted from the spot immediately distal to the ampulla Vater  Laser anticoagulation done with argon laser  · Repeat EGD on 04/09/2021:  Showing no bleedings  · Hemoglobin remaining stable post 1 unit PRBC and EGD    Plan:  · Regular diet  · Continue pantoprazole 40 mg BD oral  · Recommend follow-up with primary care physician for CBC in 1 week  · follow-up biopsy results  · Advised patient no NSAIDs, caffeine, alcohol     Aortic stenosis  Assessment & Plan  -stable    Hyperlipidemia  Assessment & Plan  · Continue atorvastatin    Hypertension  Assessment & Plan  · History of hypertension on lisinopril and amlodipine at home  · Continue home medications of discharge  · Monitor per unit protocol    Dizziness  Assessment & Plan  · Presented with worsening dizziness and lightheadedness  · History of chronic dizziness for more than 10 years  Denies vertigo    Patient does have multiple neurological symptoms such as loss of taste, loss of smell, bilateral hearing loss, bilateral ear fullness, urinary incontinence for many years  · Extensive workup done including MRI brain ruled out schwannoma and NPH, bilateral carotid duplex ultrasound, so far negative  · Patient reports that meclizine does not help with her dizziness  · Underlying etiology for chronic dizziness is unclear however recent worsening is likely due to anemia due to blood loss      -Recommended to follow-up with neurology  Reports neurology appointment in July  PCP: Hilary Hernandez MD  Admission Date: 4/6/2021  Discharge Date: 04/09/21    Disposition:     Home    Reason for Admission:  Anemia, GI    Consultations During Hospital Stay:  · Gastroenterology    Procedures Performed:     · EGD X 2    Primary diagnosis:    Acute anemia due to GI bleed; stabilized    Secondary diagnosis:   Chronic Dizziness; exacerbated by anemia; outpatient follow-up with Neurology    Significant Findings / Test Results:     EGD on 04/07/2021: Multiple erosions and shallow ulcers noted in the stomach specially in the antrum  Mild oozing of blood from erosions in the proximal stomach also seen  Significant oozing of blood was noted from the spot immediately distal to the ampulla Vater  It was difficult to visualize initially  Finally laser anti coagulation was done with argon laser  Complete hemostasis was obtained at the end of procedure    EGD on 04/09/2021: No bleeding today, some ulceration at prior site of endoscopic therapy just distal to the ampulla, duodenitis, gastritis, hiatal hernia     Incidental Findings:   · None     Test Results Pending at Discharge (will require follow up): · Biopsy from initial EGD     Outpatient Tests Requested:  · CBC in 1 week    Complications:  None    HPI:    Marbella Renteria is a 82 y o  female with past medical history of hypertension, hyperlipidemia, aortic stenosis, chronic dizziness and urinary incontinence who presents with worsening dizziness    She was sent to the ED from urgent care [place] as her hemoglobin dropped from 10 9 to 8 g per dL        Primary historians are patient and her daughter at the bedside  They reports patient is having dizziness for more than 10 years which is progressively getting worse since the beginning of this year  Patient denies spinning of the room  She reports her dizziness is more like lightheadedness and aggravated by standing up and progressively getting worse by movement  It is better with rest and sitting still  She denies any nausea, vomiting, fever, chills, recent illness  She visited ED 2 weeks ago due to worsening dizziness  At that time her hemoglobin was 10 9 and FOBT positive  She was advised to follow-up with primary care physician and discharged home with meclizine  She reports meclizine does not help her dizziness  She was advised to follow-up with GI by her PCP  She reports GI appointment will be tomorrow  She also reports of multiple chronic problems along with dizziness such as loss of taste, loss of smell, ear fullness, bilateral hearing impairment and urinary incontinence  She reports MRI of the brain and carotid duplex ultrasound was done recently which were reported negative      Patient also reports of dark color stool for more than 1 week  She reports her stool consistency has been changed which is more like mushy  She has bilateral knee arthritis  She reports she is not taking NSAID regularly  She took 2 Advils this morning on empty stomach  She drinks a glass of wine every night  She takes aspirin 81 mg daily  Not on blood thinning medication  Denies any obvious bleeding or bruising  Denies any nausea, vomiting, abdominal pain  She reports she has heart murmur since childhood  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  Colonoscopy was done at that time which was normal except polyps      In ED, initial vital signs were stable  Patient is euvolemic    CBC showed hemoglobin 8, hematocrit 24 5, CMP showed creatinine 1 33, BUN 20  EKG normal sinus rhythm  She was given IV Protonix 40 mg in ED      She was admitted to the hospital for further evaluation and management for acute blood loss anemia likely secondary to GI bleed  Discussed with Dr Joana Bah and recommended to give 1 PRCB and initiate protonix drip  Hospital Course:     After patient's admission, she received 1 unit PRBC she was initiated on Protonix drip  Gastroenterology was consulted, patient was taken for EGD  See above for EGD results  After laser anticoagulation, patient's hemoglobin has remained stable  Patient was transitioned from Protonix drip to oral pantoprazole 40 mg BD and sucralfate q i d  Ivonne Calderon Repeat EGD also showing no active bleeding  Patient's daughters were interested in short-term rehab, however patient is currently not meeting criteria  They requested home VNA, arrangements made by Case Management for same  At this point patient is stable for discharge  Patient and her daughters were advised to follow-up with her primary care physician in 1 week for repeat CBC  Also advised follow-up with Gastroenterology  There also advised to follow-up with neurology (already has an appointment) for evaluation of her chronic dizziness  Patient being sent home on pantoprazole 40 mg oral BD along with sucralfate 1 g q i d      They are agreeable with this plan  Condition at Discharge: good     Discharge Day Visit / Exam:     Subjective:  Patient was seen and examined by me at bedside  Communicated clearly  No particular overnight event reported  Hemodynamically stable and afebrile  Patient has no new complaints    Vitals: Blood Pressure: 151/67 (04/09/21 1159)  Pulse: 70 (04/09/21 1159)  Temperature: 98 °F (36 7 °C) (04/09/21 1159)  Temp Source: Temporal (04/09/21 1159)  Respirations: 18 (04/09/21 1159)  Height: 5' 2" (157 5 cm) (04/09/21 1159)  Weight - Scale: 76 2 kg (168 lb) (04/09/21 1159)  SpO2: 96 % (04/09/21 1159)  Exam:   Physical Exam  Constitutional:       Appearance: Normal appearance  HENT:      Head: Normocephalic and atraumatic  Eyes:      General: No scleral icterus  Right eye: No discharge  Left eye: No discharge  Neck:      Musculoskeletal: Normal range of motion  Cardiovascular:      Rate and Rhythm: Normal rate and regular rhythm  Pulses: Normal pulses  Heart sounds: Normal heart sounds  Comments: Aortic stenosis  Pulmonary:      Effort: Pulmonary effort is normal       Breath sounds: Normal breath sounds  Abdominal:      General: Bowel sounds are normal  There is no distension  Palpations: Abdomen is soft  Tenderness: There is no abdominal tenderness  Musculoskeletal: Normal range of motion  Skin:     General: Skin is warm and dry  Neurological:      General: No focal deficit present  Mental Status: She is alert and oriented to person, place, and time  Psychiatric:         Mood and Affect: Mood normal          Thought Content: Thought content normal          Discussion with Family:  Discussed with her daughter Luke Rodriguez via phone, and family member at bedside    Discharge instructions/Information to patient and family:   See after visit summary for information provided to patient and family  Provisions for Follow-Up Care:  See after visit summary for information related to follow-up care and any pertinent home health orders  Planned Readmission:  None     Discharge Medications:  See after visit summary for reconciled discharge medications provided to patient and family        ** Please Note: This note has been constructed using a voice recognition system **

## 2021-04-09 NOTE — ASSESSMENT & PLAN NOTE
· Presented with worsening dizziness and lightheadedness  · History of chronic dizziness for more than 10 years  Denies vertigo  Patient does have multiple neurological symptoms such as loss of taste, loss of smell, bilateral hearing loss, bilateral ear fullness, urinary incontinence for many years  · Extensive workup done including MRI brain ruled out schwannoma and NPH, bilateral carotid duplex ultrasound, so far negative  · Patient reports that meclizine does not help with her dizziness  · Underlying etiology for chronic dizziness is unclear however recent worsening is likely due to anemia due to blood loss      -Recommended to follow-up with neurology  Reports neurology appointment in July

## 2021-04-09 NOTE — CASE MANAGEMENT
LOS: 3 days  Bundle: No  Readmission: No  Unplanned Readmission Score: 12    Patient admitted to hospital as an inpatient on 4/6/21  CM Met with _Patient on 4/9/21______  to introduce CM, review role, complete initial assessment and discuss DCP  Lives where:  [address]   [address] [id number]  Lives with: Alone  Home Type & Entry: 1 level house with 2 step entry  DME: None  ADLs: Independent  VNA history: None  STR history: None  Pharmacy Preference & Coverage: [facility], [facility]  Mental Health/Drug/ETOH history: None  Dialysis history: None  POA/AD/LW: Dtr   Income/Employment: SSI  Transportation: Drives self  PCP: Yamilet Dickson    Transport Home: Dtr Gino Bustamante will take home  DCP:    CM discussed DCP with pt  Pt stated her dtr Gino Bustamante will take her home at DC  CM asked if there were any further needs at this time  Pt denied having any further needs at this time  CM reviewed discharge planning process including the following: identifying caregivers at home, preference for d/c planning needs,   availability of [facility] to [facility] program, availability of treatment team to discuss questions or concerns patient and/or family may have regarding diagnosis, plan of care, old or new medications and discharge planning   CM will continue to follow for care coordination and update assessment as appropriate

## 2021-04-09 NOTE — CASE MANAGEMENT
CM asked pt if she wanted VNA services  Pt was agreeable to VNA  CM discussed VNA services with pt  Pt agreed to VNA services  CM discussed freedom of choice  Pt did not have any preferences  CM to make referrals

## 2021-04-09 NOTE — ASSESSMENT & PLAN NOTE
· Presented with black tarry stool for more than 1 week  Worsening dizziness for 2 weeks  She has chronic dizziness for more than 10 years  · History of NSAIDs use, though denies chronic or excessive use  · Hemoglobin dropped 6 gram within 1 month  (14 g/dl to 8 g/dl); status post 1 RBC transfusion, hemoglobin has improved to 8 2  · FOBT positive 2 weeks ago  · Colonoscopy in 2019 showed 2 polyps and diverticulosis  · Gastroenterology following  · EGD on 04/07/2021:Multiple erosions and shallow ulcers noted in the stomach specially in the antrum  Mild oozing of blood from erosions in the proximal stomach also seen  Significant oozing of blood was noted from the spot immediately distal to the ampulla Vater    Laser anticoagulation done with argon laser  · Repeat EGD on 04/09/2021:  Showing no bleedings  · Hemoglobin remaining stable post 1 unit PRBC and EGD    Plan:  · Regular diet  · Continue pantoprazole 40 mg BD oral  · Recommend follow-up with primary care physician for CBC in 1 week  · follow-up biopsy results  · Advised patient no NSAIDs, caffeine, alcohol

## 2021-04-09 NOTE — ANESTHESIA POSTPROCEDURE EVALUATION
Post-Op Assessment Note    CV Status:  Stable  Pain Score: 0    Pain management: adequate     Mental Status:  Alert and awake   Hydration Status:  Euvolemic   PONV Controlled:  Controlled   Airway Patency:  Patent      Post Op Vitals Reviewed: Yes      Staff: Anesthesiologist, CRNA         No complications documented      BP   121/50   Temp   98 4   Pulse 76   Resp 16   SpO2 97

## 2021-04-09 NOTE — ANESTHESIA PREPROCEDURE EVALUATION
Procedure:  EGD    No records of last echo available  Reported Mild AS and moderate AR    Moderate Hypoxia, Sats 89% on arrival to OR suit, improved with deep breathing - no etiology suggested on recent progress notes    Update: improved to 95% with deep breathing  Relevant Problems   CARDIO   (+) Aortic stenosis   (+) Hyperlipidemia   (+) Hypertension      HEMATOLOGY   (+) Acute blood loss anemia      MUSCULOSKELETAL   (+) Chronic bilateral low back pain without sciatica      Other   (+) Dizziness        Physical Exam    Airway    Mallampati score: II  TM Distance: >3 FB  Neck ROM: full     Dental   No notable dental hx     Cardiovascular  Rhythm: regular, Rate: normal, Cardiovascular exam normal    Pulmonary  Pulmonary exam normal Breath sounds clear to auscultation, Rales,     Other Findings        Anesthesia Plan  ASA Score- 3     Anesthesia Type- IV sedation with anesthesia with ASA Monitors  Additional Monitors:   Airway Plan:     Comment: Discussed risks/benefits, including medication reactions, awareness, aspiration, and serious/life threatening complications  Plan to maintain native airway with IVGA, monitored with EtCO2  Plan Factors-Exercise tolerance (METS): >4 METS  Patient summary reviewed  Patient instructed to abstain from smoking on day of procedure  Patient did not smoke on day of surgery  Induction- intravenous  Postoperative Plan-     Informed Consent- Anesthetic plan and risks discussed with patient  I personally reviewed this patient with the CRNA  Discussed and agreed on the Anesthesia Plan with the CRNA  Brina Faith

## 2021-04-12 ENCOUNTER — TRANSITIONAL CARE MANAGEMENT (OUTPATIENT)
Dept: FAMILY MEDICINE CLINIC | Facility: CLINIC | Age: 86
End: 2021-04-12

## 2021-04-12 ENCOUNTER — OFFICE VISIT (OUTPATIENT)
Dept: FAMILY MEDICINE CLINIC | Facility: CLINIC | Age: 86
End: 2021-04-12
Payer: COMMERCIAL

## 2021-04-12 VITALS
WEIGHT: 168 LBS | DIASTOLIC BLOOD PRESSURE: 64 MMHG | OXYGEN SATURATION: 96 % | RESPIRATION RATE: 16 BRPM | HEART RATE: 93 BPM | SYSTOLIC BLOOD PRESSURE: 160 MMHG | HEIGHT: 62 IN | BODY MASS INDEX: 30.91 KG/M2

## 2021-04-12 DIAGNOSIS — R41.3 MEMORY IMPAIRMENT: ICD-10-CM

## 2021-04-12 DIAGNOSIS — K27.9 PUD (PEPTIC ULCER DISEASE): ICD-10-CM

## 2021-04-12 DIAGNOSIS — D50.0 IRON DEFICIENCY ANEMIA DUE TO CHRONIC BLOOD LOSS: ICD-10-CM

## 2021-04-12 DIAGNOSIS — K92.2 UPPER GI BLEED: ICD-10-CM

## 2021-04-12 DIAGNOSIS — R42 VERTIGO: ICD-10-CM

## 2021-04-12 DIAGNOSIS — D62 ACUTE BLOOD LOSS ANEMIA: Primary | ICD-10-CM

## 2021-04-12 PROCEDURE — 1111F DSCHRG MED/CURRENT MED MERGE: CPT | Performed by: FAMILY MEDICINE

## 2021-04-12 PROCEDURE — 99496 TRANSJ CARE MGMT HIGH F2F 7D: CPT | Performed by: FAMILY MEDICINE

## 2021-04-12 NOTE — PROGRESS NOTES
Assessment/Plan:  Needs GI follow up   I called the daughter t- left a msg about the plan    1  Acute blood loss anemia  -     CBC and differential; Future; Expected date: 04/19/2021  -     Ambulatory referral to Gastroenterology; Future    2  PUD (peptic ulcer disease)  -     Ambulatory referral to Gastroenterology; Future    3  Upper GI bleed  Comments:  she can miss 1 pill of carafate   she can miss 1 iron  daily   she cannot miss any protonix     Orders:  -     Ambulatory referral to Gastroenterology; Future    4  Vertigo  Comments:  seeing neuro in july   · Extensive workup done including MRI brain ruled out schwannoma and NPH, bilateral carotid duplex ultrasound, so far negative  · Patien    5  Memory impairment  Comments:  seeing neuro in july     6  Iron deficiency anemia due to chronic blood loss  Comments:  needs iron bid   but if she cannt take it then infusion is fine            Subjective:      Patient ID: Tiffanie Solitario is a 82 y o  female  HPI here for TCM for upper GI bleed  With hb drop   Was on protonix drip and carafate   Since being home:    Hard time taking the meds   carafate Qid    protonix Bid   Feso4 Bid     She feels more dizzy when she takes  Them   EGD with large bleeding ulcer  Lasered   Better on 2nd EGD   Needs GI follow up   I called the daughter t- left a msg about the plan     The following portions of the patient's history were reviewed and updated as appropriate: allergies, current medications, past family history, past medical history, past social history, past surgical history and problem list     Review of Systems   Constitutional: Negative for fever and unexpected weight change  HENT: Negative for nosebleeds and trouble swallowing  Eyes: Negative for visual disturbance  Respiratory: Negative for chest tightness and shortness of breath  Cardiovascular: Negative for chest pain, palpitations and leg swelling     Gastrointestinal: Negative for abdominal pain, constipation, diarrhea and nausea  Endocrine: Negative for cold intolerance  Genitourinary: Negative for dysuria and urgency  Musculoskeletal: Positive for gait problem  Negative for joint swelling and myalgias  Skin: Negative for rash  Neurological: Positive for dizziness  Negative for tremors, seizures and syncope  Hematological: Does not bruise/bleed easily  Psychiatric/Behavioral: Positive for behavioral problems and decreased concentration  Negative for hallucinations and suicidal ideas           Objective:      /64 (BP Location: Left arm, Patient Position: Sitting, Cuff Size: Standard)   Pulse 93   Resp 16   Ht 5' 2" (1 575 m)   Wt 76 2 kg (168 lb)   SpO2 96%   BMI 30 73 kg/m²     Admission on 04/06/2021, Discharged on 04/09/2021   Component Date Value    WBC 04/06/2021 6 60     RBC 04/06/2021 2 72*    Hemoglobin 04/06/2021 8 0*    Hematocrit 04/06/2021 25 5*    MCV 04/06/2021 94     MCH 04/06/2021 29 4     MCHC 04/06/2021 31 4     RDW 04/06/2021 14 7     MPV 04/06/2021 10 4     Platelets 79/70/2509 213     Neutrophils Relative 04/06/2021 60     Immat GRANS % 04/06/2021 0     Lymphocytes Relative 04/06/2021 30     Monocytes Relative 04/06/2021 8     Eosinophils Relative 04/06/2021 2     Basophils Relative 04/06/2021 0     Neutrophils Absolute 04/06/2021 3 97     Immature Grans Absolute 04/06/2021 0 01     Lymphocytes Absolute 04/06/2021 1 95     Monocytes Absolute 04/06/2021 0 52     Eosinophils Absolute 04/06/2021 0 13     Basophils Absolute 04/06/2021 0 02     Sodium 04/06/2021 142     Potassium 04/06/2021 3 8     Chloride 04/06/2021 107     CO2 04/06/2021 25     ANION GAP 04/06/2021 10     BUN 04/06/2021 20     Creatinine 04/06/2021 1 23*    Glucose 04/06/2021 120     Calcium 04/06/2021 9 4     AST 04/06/2021 17     ALT 04/06/2021 15     Alkaline Phosphatase 04/06/2021 40 5     Total Protein 04/06/2021 6 4     Albumin 04/06/2021 4 0     Total Bilirubin 04/06/2021 0 36     eGFR 04/06/2021 41     Magnesium 04/06/2021 2 0     TSH 3RD GENERATON 04/06/2021 2 372     ABO Grouping 04/06/2021 B     Rh Factor 04/06/2021 Positive     Antibody Screen 04/06/2021 Negative     Specimen Expiration Date 04/06/2021 [id number]     Troponin I 04/06/2021 <0 03     ABO Grouping 04/06/2021 B     Rh Factor 04/06/2021 Positive     Ventricular Rate 04/06/2021 92     Atrial Rate 04/06/2021 90     MD Interval 04/06/2021 149     QRSD Interval 04/06/2021 85     QT Interval 04/06/2021 359     QTC Interval 04/06/2021 445     P Axis 04/06/2021 73     QRS Axis 04/06/2021 -19     T Wave Axis 04/06/2021 62     Hemoglobin 04/06/2021 7 2*    Hematocrit 04/06/2021 23 0*    Hemoglobin 04/06/2021 7 3*    Hematocrit 04/06/2021 23 3*    Protime 04/06/2021 10 9     INR 04/06/2021 0 96     Iron Saturation 04/06/2021 17     TIBC 04/06/2021 335     Iron 04/06/2021 56     Ferritin 04/06/2021 15     Unit Product Code 04/07/2021 [id number]     Unit Number 04/07/2021 [id number]     Unit ABO 04/07/2021 O     Unit RH 04/07/2021 POS     Crossmatch 04/07/2021 Compatible     Unit Dispense Status 04/07/2021 Presumed Trans     WBC 04/07/2021 5 24     RBC 04/07/2021 2 82*    Hemoglobin 04/07/2021 8 4*    Hematocrit 04/07/2021 26 7*    MCV 04/07/2021 95     MCH 04/07/2021 29 8     MCHC 04/07/2021 31 5     RDW 04/07/2021 14 7     MPV 04/07/2021 11 0     Platelets 71/96/8352 191     Neutrophils Relative 04/07/2021 52     Immat GRANS % 04/07/2021 0     Lymphocytes Relative 04/07/2021 36     Monocytes Relative 04/07/2021 9     Eosinophils Relative 04/07/2021 3     Basophils Relative 04/07/2021 0     Neutrophils Absolute 04/07/2021 2 71     Immature Grans Absolute 04/07/2021 0 01     Lymphocytes Absolute 04/07/2021 1 90     Monocytes Absolute 04/07/2021 0 47     Eosinophils Absolute 04/07/2021 0 13     Basophils Absolute 04/07/2021 0 02     Sodium 04/07/2021 145     Potassium 04/07/2021 3 6     Chloride 04/07/2021 109*    CO2 04/07/2021 29     ANION GAP 04/07/2021 7     BUN 04/07/2021 17     Creatinine 04/07/2021 1 22*    Glucose 04/07/2021 88     Calcium 04/07/2021 8 7     eGFR 04/07/2021 41     Magnesium 04/07/2021 2 0     Phosphorus 04/07/2021 3 7     Hemoglobin 04/07/2021 8 6*    Hematocrit 04/07/2021 27 1*    Hemoglobin 04/07/2021 8 1*    Hematocrit 04/07/2021 25 6*    Sodium 04/08/2021 143     Potassium 04/08/2021 3 9     Chloride 04/08/2021 109*    CO2 04/08/2021 26     ANION GAP 04/08/2021 8     BUN 04/08/2021 14     Creatinine 04/08/2021 1 05     Glucose 04/08/2021 85     Calcium 04/08/2021 8 7     eGFR 04/08/2021 49     WBC 04/08/2021 5 77     RBC 04/08/2021 2 90*    Hemoglobin 04/08/2021 8 6*    Hematocrit 04/08/2021 27 2*    MCV 04/08/2021 94     MCH 04/08/2021 29 7     MCHC 04/08/2021 31 6     RDW 04/08/2021 14 8     MPV 04/08/2021 10 5     Platelets 53/75/3132 188     Neutrophils Relative 04/08/2021 68     Immat GRANS % 04/08/2021 0     Lymphocytes Relative 04/08/2021 22     Monocytes Relative 04/08/2021 7     Eosinophils Relative 04/08/2021 3     Basophils Relative 04/08/2021 0     Neutrophils Absolute 04/08/2021 3 90     Immature Grans Absolute 04/08/2021 0 00     Lymphocytes Absolute 04/08/2021 1 26     Monocytes Absolute 04/08/2021 0 42     Eosinophils Absolute 04/08/2021 0 17     Basophils Absolute 04/08/2021 0 02     WBC 04/09/2021 5 69     RBC 04/09/2021 2 88*    Hemoglobin 04/09/2021 8 6*    Hematocrit 04/09/2021 27 0*    MCV 04/09/2021 94     MCH 04/09/2021 29 9     MCHC 04/09/2021 31 9     RDW 04/09/2021 14 5     MPV 04/09/2021 11 2     Platelets 61/27/4776 181     Neutrophils Relative 04/09/2021 63     Immat GRANS % 04/09/2021 0     Lymphocytes Relative 04/09/2021 25     Monocytes Relative 04/09/2021 9     Eosinophils Relative 04/09/2021 3     Basophils Relative 04/09/2021 0     Neutrophils Absolute 04/09/2021 3 60     Immature Grans Absolute 04/09/2021 0 00     Lymphocytes Absolute 04/09/2021 1 42     Monocytes Absolute 04/09/2021 0 51     Eosinophils Absolute 04/09/2021 0 15     Basophils Absolute 04/09/2021 0 01     Case Report 04/09/2021                      Value:Surgical Pathology Report                         Case: [id number]                                   Authorizing Provider:  Daniel Schwartz MD           Collected:           04/09/2021 1241              Ordering Location:     [facility] Received:            04/09/2021 1321                                     3rd  Floor Med Surg Unit                                                     Pathologist:           Igor Smith MD                                                                 Specimen:    Esophagus, bx lower esophagus for erythema                                                 Final Diagnosis 04/09/2021                      Value: This result contains rich text formatting which cannot be displayed here   Additional Information 04/09/2021                      Value: This result contains rich text formatting which cannot be displayed here  [facility] Description 04/09/2021                      Value: This result contains rich text formatting which cannot be displayed here     Office Visit on 04/06/2021   Component Date Value    POC Glucose 04/06/2021 100     Ventricular Rate 04/06/2021 81     Atrial Rate 04/06/2021 81     HI Interval 04/06/2021 148     QRSD Interval 04/06/2021 78     QT Interval 04/06/2021 374     QTC Interval 04/06/2021 434     P Axis 04/06/2021 68     QRS Axis 04/06/2021 -32     T Wave Axis 04/06/2021 58      TCM Call (since 3/12/2021)     Date and time call was made  4/12/2021  8:10 AM    Hospital care reviewed  Records reviewed    Patient was hospitialized at  [address]        Date of Admission  04/06/21    Date of discharge  04/09/21    Diagnosis  Acute blood loss anemia    Disposition  Home; Home health services    Were the patients medications reviewed and updated  Yes    Current Symptoms  None      TCM Call (since 3/12/2021)     Post hospital issues  None    Should patient be enrolled in anticoag monitoring? No    Scheduled for follow up? Yes    Patients specialists  Other (comment)    Other specialists names  aishwarya Fabian (gi)    Did you obtain your prescribed medications  Yes    Do you need help managing your prescriptions or medications  No    Is transportation to your appointment needed  No    I have advised the patient to call PCP with any new or worsening symptoms  liat freedman ma    Support System  Family    Do you have social support  Yes, as much as I need    Are you recieving any outpatient services  No    Are you recieving home care services  No    Are you using any community resources  No    Current waiver services  No    Have you fallen in the last 12 months  No    Interperter language line needed  No             Physical Exam  Vitals signs and nursing note reviewed  Constitutional:       Appearance: She is well-developed  Comments: Unsteady    HENT:      Head: Normocephalic and atraumatic  Neck:      Musculoskeletal: Normal range of motion and neck supple  Cardiovascular:      Rate and Rhythm: Normal rate and regular rhythm  Heart sounds: Normal heart sounds  No murmur  Pulmonary:      Effort: Pulmonary effort is normal       Breath sounds: Normal breath sounds  No wheezing or rales  Abdominal:      General: Bowel sounds are normal  There is no distension  Palpations: Abdomen is soft  Tenderness: There is no abdominal tenderness  Musculoskeletal: Normal range of motion  General: No tenderness  Lymphadenopathy:      Cervical: No cervical adenopathy  Skin:     General: Skin is warm and dry  Capillary Refill: Capillary refill takes less than 2 seconds  Findings: No rash     Neurological:      Mental Status: She is alert  Mental status is at baseline  Cranial Nerves: No cranial nerve deficit  Sensory: No sensory deficit  Motor: No abnormal muscle tone  Psychiatric:         Behavior: Behavior normal          Thought Content:  Thought content normal          Judgment: Judgment normal              MD Maxi Gallegos

## 2021-04-12 NOTE — UTILIZATION REVIEW
Notification of Discharge  This is a Notification of Discharge from our facility [facility]  Please be advised that this patient has been discharge from our facility  Below you will find the admission and discharge date and time including the patients disposition  PRESENTATION DATE: 4/6/2021  5:13 PM  OBS ADMISSION DATE:   IP ADMISSION DATE: 4/6/21 1852   DISCHARGE DATE: 4/9/2021  4:04 PM  DIS[facility]ITION[facility] [address] [facility]   Admission Orders listed below:  Admission Orders (From admission, onward)     Ordered        04/06/21 1852  Inpatient Admission  Once                   Please contact the UR Department if additional information is required to close this patient's authorization/case  [address] [facility] Utilization Review Department  Main: [phone] x carefully listen to the prompts  All voicemails are confidential   [email or web address]  Send all requests for admission clinical reviews, approved or denied determinations and any other requests to dedicated fax number below belonging to the campus where the patient is receiving treatment   List of dedicated fax numbers:  [address] DENIALS (Administrative/Medical Necessity) [phone]   [address] (Maternity/NICU/Pediatrics) [phone] [address] [facility] [phone]   Kelly Singh [phone]   Neri Becker [phone]   Gerri Nyhan [facility] [address] [facility] [phone]   [facility]  [phone]   [address] [facility] [address] [facility]  [address] [facility] [address] [facility] [phone]

## 2021-04-13 ENCOUNTER — EVALUATION (OUTPATIENT)
Dept: PHYSICAL THERAPY | Facility: CLINIC | Age: 86
End: 2021-04-13
Payer: COMMERCIAL

## 2021-04-13 ENCOUNTER — TELEPHONE (OUTPATIENT)
Dept: FAMILY MEDICINE CLINIC | Facility: CLINIC | Age: 86
End: 2021-04-13

## 2021-04-13 DIAGNOSIS — R42 DIZZINESS AND GIDDINESS: Primary | ICD-10-CM

## 2021-04-13 PROCEDURE — 97162 PT EVAL MOD COMPLEX 30 MIN: CPT | Performed by: PHYSICAL THERAPIST

## 2021-04-13 NOTE — PROGRESS NOTES
PT Evaluation     Today's date: 2021  Patient name: Samm Engle  : 1935  MRN: [id number]  Referring provider: Virgil Mendenhall MD  Dx:   Encounter Diagnosis     ICD-10-CM    1  Dizziness and giddiness  R42                   Assessment  Assessment details: Patient is a 82 y o  female who presents with s/s consistent with chronic dizziness and balance dysfunction  Patient does not demonstrate any sx's of BPPV due to otoconia displacement  Pt's dizziness appears to be vestibular nerve dysfunction and impairment in origin  Pt appeared significantly more impaired with components of DGI and was unable to complete due to increased dizziness the more pt was standing and walking and turning head side to side  Head thrust revealed VOR impairments on ea side  FT EC pt was unable to maintain balance  Discussed risks, benefits, and alternatives to treatment, and answered all patient questions to patient satisfaction  Educated pt on vestibular nerve and overall dysfunction and impairments with balance  Patient would benefit from skilled PT services to address impairments stated above  These deficits are limiting patient's ability to perform all of her ADLs and recreational related activities without feeling more dizziness and feeling off balance and increasing risks for falls  Patient appears motivated, agrees with the POC, and is a good candidate for skilled PT at this time   Thank you for the referral     Impairments: abnormal gait, abnormal muscle firing, abnormal or restricted ROM, impaired balance, lacks appropriate home exercise program, poor posture  and poor body mechanics    Symptom irritability: moderate  Goals  Impairment Goals:   - In 6 weeks, pt will improve c/spine A/PROM to [facility] and with 0/10 pain  - In 6 weeks, pt will report dizziness no greater than a 2/10 with all ADLs and recreational activities   - In 6 weeks, pt will demonstrate improved ability to complete all components of DGI and score a 15/21 or better       Functional Goals:   - By DC, pt will score a 75% or better on FOTO to demonstrate improved functional level   - By DC, pt will be able to stand for 15 minutes or longer with no increase in dizziness    - By DC, pt will be able to walk without limitations due to feeling more dizzy  - By DC, pt will demonstrate IND and compliance with HEP for improved carryover at home        Plan  Patient would benefit from: skilled physical therapy  Planned modality interventions: cryotherapy, TENS and unattended electrical stimulation  Planned therapy interventions: activity modification, joint mobilization, manual therapy, balance, neuromuscular re-education, behavior modification, body mechanics training, patient education, postural training, gait training, therapeutic training, therapeutic exercise, therapeutic activities, stretching, strengthening, flexibility and home exercise program  Frequency: 2x week  Duration in weeks: 6  Plan of Care beginning date: 2021  Plan of Care expiration date: 2021  Treatment plan discussed with: patient        Subjective Evaluation    History of Present Illness  Mechanism of injury: Pt having dizziness 10-12 years ago and pt felt that she could cope with it  Pt's said that she was able to drive today as she just had an endoscopy earlier this past week  Pt states that she always feels dizziness when she is up and walking around, but does not have any dizziness when lying down, sleeping, or sitting down  Pt states that over the past few weeks her dizziness has been worse and made her seek treatment  Pt states her dizziness makes her feel off equilibrium and light-headed  Pt states she does not have double vision and does not have any numbness or tingling            Recurrent probem    Quality of life: good    Pain  Current pain ratin  At best pain ratin  At worst pain ratin  Aggravating factors: standing, walking and nothing  Progression: no change    Social Support    Employment status: not working  Patient Goals  Patient goals for therapy: return to sport/leisure activities and improved balance  Patient goal: get rid of dizziness        Objective     Active Range of Motion   Cervical/Thoracic Spine       Cervical    Flexion: 30 degrees   Extension: 10 degrees      Left lateral flexion: 2 degrees      Right lateral flexion: 2 degrees      Left rotation: 30 degrees  Right rotation: 38 degrees           Neuro Exam:     Dizziness  Positive for disequilibrium and light-headedness  Negative for vertigo, oscillopsia and diplopia  Cervical exam   Ligament Laxity Testing   Alar ligament: WNL  Sharp Jovana: WNL  Modified VBI   Left: asymptomatic  Right: asymptomatic  Seated posture: forward head posture and internally rotated shoulders    Oculomotor exam   Oculomotor ROM: WNL  Resting nystagmus: not present   Smooth pursuits: saccadic smooth pursuit  Vertical saccades: hypometric  Horizontal saccades: hypometric   Head thrust: left abnormal and right abnormal  Dynamic visual acuity: abnormal    Positional testing   Positional testing comment: DGI: highly impaired unable to finish test due to dizziness increasing most impaired with walking head turns   0/4; would extrapolate severe impairment at this time will further assess NV    Increased posterior wt shift with toes off with FT EC      Balance assessments   MCTSIB   Eyes open level surface: 30  Eyes closed level surface: 5             Diagnosis: dizziness   Precautions: HTN, previous cardiac hx, fall risk    Primary goals: improve balance decrease dizziness   Asterisks next to exercises = provided for patient HEP   Manual Therapy                                                Exercise Diary         Seated c/spine ROT 10x ea way        Seated c/spine SB 10x ea way                                                                                                                Ther Act Modalities

## 2021-04-14 DIAGNOSIS — B96.81 HELICOBACTER PYLORI GASTRITIS: Primary | ICD-10-CM

## 2021-04-14 DIAGNOSIS — K29.70 HELICOBACTER PYLORI GASTRITIS: Primary | ICD-10-CM

## 2021-04-14 RX ORDER — BISMUTH SUBSALICYLATE 262 MG/1
524 TABLET, CHEWABLE ORAL
Qty: 112 TABLET | Refills: 0 | Status: SHIPPED | OUTPATIENT
Start: 2021-04-14 | End: 2021-04-28

## 2021-04-14 RX ORDER — TETRACYCLINE HYDROCHLORIDE 500 MG/1
500 CAPSULE ORAL 4 TIMES DAILY
Qty: 56 CAPSULE | Refills: 0 | Status: SHIPPED | OUTPATIENT
Start: 2021-04-14 | End: 2021-04-28

## 2021-04-14 RX ORDER — METRONIDAZOLE 250 MG/1
250 TABLET ORAL 4 TIMES DAILY
Qty: 56 TABLET | Refills: 0 | Status: SHIPPED | OUTPATIENT
Start: 2021-04-14 | End: 2021-04-28

## 2021-04-16 ENCOUNTER — OFFICE VISIT (OUTPATIENT)
Dept: PHYSICAL THERAPY | Facility: CLINIC | Age: 86
End: 2021-04-16
Payer: COMMERCIAL

## 2021-04-16 DIAGNOSIS — R42 DIZZINESS AND GIDDINESS: Primary | ICD-10-CM

## 2021-04-16 PROCEDURE — 97112 NEUROMUSCULAR REEDUCATION: CPT | Performed by: PHYSICAL THERAPIST

## 2021-04-16 PROCEDURE — 97140 MANUAL THERAPY 1/> REGIONS: CPT | Performed by: PHYSICAL THERAPIST

## 2021-04-16 NOTE — PROGRESS NOTES
Daily Note     Today's date: 2021  Patient name: Isacc Roberts  : 1935  MRN: [id number]  Referring provider: Clau Queen MD  Dx:   Encounter Diagnosis     ICD-10-CM    1  Dizziness and giddiness  R42                   Subjective: Pt reports feeling the same amount of dizziness since last time      Objective: See treatment diary below      Assessment: Tolerated treatment fair/good today  Demonstrated dizziness when standing and performing dynamic exercises including side stepping and walking marches both of which pt required additional time to relax in between exercises  Pt did demonstrate mild/moderate difficulty with weight shifting during biodex and required cues throughout to avoid excessive heel raise  Pt's overall dizziness remained about constant t/o session; education for pt on vestibular rehab in which she appeared to understand but will likely require more education NV  Cont skilled PT  Plan: Continue per plan of care        Diagnosis: dizziness   Precautions: HTN, previous cardiac hx, fall risk    Primary goals: improve balance decrease dizziness   Asterisks next to exercises = provided for patient HEP   Manual Therapy       Neck PROM  WJB: seated                                      Exercise Diary         Seated c/spine ROT 10x ea way  10x10'' ea way       Seated c/spine SB 10x ea way  10x10'' ea way       VOR x1  3x30'' w/ pen tip                       Standing balance   2x10 up/down and side/side      Standing balance  FA EC 3x30'' max cues and CGA                      Dynamic   Side stepping 4x10';   March walking 4x10'                      Biodex  Limits of stability 2 rounds;   Maze control 2 rounds; both static base      Education   WJB: education on dizziness, HEP              Ther Act                                        Modalities

## 2021-04-19 ENCOUNTER — APPOINTMENT (OUTPATIENT)
Dept: LAB | Facility: CLINIC | Age: 86
End: 2021-04-19
Payer: COMMERCIAL

## 2021-04-19 ENCOUNTER — TRANSCRIBE ORDERS (OUTPATIENT)
Dept: LAB | Facility: CLINIC | Age: 86
End: 2021-04-19

## 2021-04-19 DIAGNOSIS — D62 ACUTE BLOOD LOSS ANEMIA: ICD-10-CM

## 2021-04-19 LAB
BASOPHILS # BLD AUTO: 0.04 THOUSANDS/ΜL (ref 0–0.1)
BASOPHILS NFR BLD AUTO: 1 % (ref 0–1)
EOSINOPHIL # BLD AUTO: 0.11 THOUSAND/ΜL (ref 0–0.61)
EOSINOPHIL NFR BLD AUTO: 2 % (ref 0–6)
ERYTHROCYTE [DISTWIDTH] IN BLOOD BY AUTOMATED COUNT: 14.1 % (ref 11.6–15.1)
HCT VFR BLD AUTO: 31 % (ref 34.8–46.1)
HGB BLD-MCNC: 9.6 G/DL (ref 11.5–15.4)
IMM GRANULOCYTES # BLD AUTO: 0.01 THOUSAND/UL (ref 0–0.2)
IMM GRANULOCYTES NFR BLD AUTO: 0 % (ref 0–2)
LYMPHOCYTES # BLD AUTO: 1.62 THOUSANDS/ΜL (ref 0.6–4.47)
LYMPHOCYTES NFR BLD AUTO: 30 % (ref 14–44)
MCH RBC QN AUTO: 28.5 PG (ref 26.8–34.3)
MCHC RBC AUTO-ENTMCNC: 31 G/DL (ref 31.4–37.4)
MCV RBC AUTO: 92 FL (ref 82–98)
MONOCYTES # BLD AUTO: 0.55 THOUSAND/ΜL (ref 0.17–1.22)
MONOCYTES NFR BLD AUTO: 10 % (ref 4–12)
NEUTROPHILS # BLD AUTO: 3.15 THOUSANDS/ΜL (ref 1.85–7.62)
NEUTS SEG NFR BLD AUTO: 57 % (ref 43–75)
NRBC BLD AUTO-RTO: 0 /100 WBCS
PLATELET # BLD AUTO: 241 THOUSANDS/UL (ref 149–390)
PMV BLD AUTO: 10.8 FL (ref 8.9–12.7)
RBC # BLD AUTO: 3.37 MILLION/UL (ref 3.81–5.12)
WBC # BLD AUTO: 5.48 THOUSAND/UL (ref 4.31–10.16)

## 2021-04-19 PROCEDURE — 85025 COMPLETE CBC W/AUTO DIFF WBC: CPT

## 2021-04-19 PROCEDURE — 36415 COLL VENOUS BLD VENIPUNCTURE: CPT

## 2021-04-21 ENCOUNTER — TELEPHONE (OUTPATIENT)
Dept: FAMILY MEDICINE CLINIC | Facility: CLINIC | Age: 86
End: 2021-04-21

## 2021-04-21 ENCOUNTER — OFFICE VISIT (OUTPATIENT)
Dept: PHYSICAL THERAPY | Facility: CLINIC | Age: 86
End: 2021-04-21
Payer: COMMERCIAL

## 2021-04-21 ENCOUNTER — OFFICE VISIT (OUTPATIENT)
Dept: FAMILY MEDICINE CLINIC | Facility: CLINIC | Age: 86
End: 2021-04-21
Payer: COMMERCIAL

## 2021-04-21 VITALS
WEIGHT: 168 LBS | HEART RATE: 82 BPM | OXYGEN SATURATION: 94 % | BODY MASS INDEX: 30.91 KG/M2 | RESPIRATION RATE: 16 BRPM | SYSTOLIC BLOOD PRESSURE: 144 MMHG | HEIGHT: 62 IN | DIASTOLIC BLOOD PRESSURE: 76 MMHG

## 2021-04-21 DIAGNOSIS — R42 DIZZINESS AND GIDDINESS: Primary | ICD-10-CM

## 2021-04-21 DIAGNOSIS — D62 ACUTE BLOOD LOSS ANEMIA: ICD-10-CM

## 2021-04-21 DIAGNOSIS — K25.3 ACUTE GASTRIC ULCER DUE TO HELICOBACTER PYLORI: ICD-10-CM

## 2021-04-21 DIAGNOSIS — I35.0 NONRHEUMATIC AORTIC VALVE STENOSIS: ICD-10-CM

## 2021-04-21 DIAGNOSIS — R60.0 LOWER EXTREMITY EDEMA: Primary | ICD-10-CM

## 2021-04-21 DIAGNOSIS — B96.81 ACUTE GASTRIC ULCER DUE TO HELICOBACTER PYLORI: ICD-10-CM

## 2021-04-21 PROCEDURE — 99214 OFFICE O/P EST MOD 30 MIN: CPT | Performed by: FAMILY MEDICINE

## 2021-04-21 PROCEDURE — 97112 NEUROMUSCULAR REEDUCATION: CPT | Performed by: PHYSICAL THERAPIST

## 2021-04-21 PROCEDURE — 1111F DSCHRG MED/CURRENT MED MERGE: CPT | Performed by: FAMILY MEDICINE

## 2021-04-21 RX ORDER — POTASSIUM CHLORIDE 20 MEQ/1
20 TABLET, EXTENDED RELEASE ORAL DAILY
Qty: 30 TABLET | Refills: 0 | Status: SHIPPED | OUTPATIENT
Start: 2021-04-21 | End: 2021-05-11 | Stop reason: SDUPTHER

## 2021-04-21 RX ORDER — FUROSEMIDE 20 MG/1
20 TABLET ORAL DAILY
Qty: 30 TABLET | Refills: 0 | Status: SHIPPED | OUTPATIENT
Start: 2021-04-21 | End: 2021-05-11 | Stop reason: SDUPTHER

## 2021-04-21 NOTE — PROGRESS NOTES
Daily Note     Today's date: 2021  Patient name: Criss Nunez  : 1935  MRN: [id number]  Referring provider: Tahmina Rees MD  Dx:   Encounter Diagnosis     ICD-10-CM    1  Dizziness and giddiness  R42                   Subjective: Pt reports feeling the same or maybe a little more dizzy since last time; pt did state she feels she has more fluid in her feet    Objective: See treatment diary below      Assessment: Tolerated treatment to same capacity as last visit; no change in overall dizziness t/o session today  Main complaint of dizziness is likely medication related as pt continues to report constant dizziness t/o session with no real change at any time  Instructed pt on reported to doctor regarding fluid increase in feet  Cont skilled PT  Plan: Continue per plan of care        Diagnosis: dizziness   Precautions: HTN, previous cardiac hx, fall risk    Primary goals: improve balance decrease dizziness   Asterisks next to exercises = provided for patient HEP   Manual Therapy      Neck PROM  WJB: seated WJB: seated and MFR                                     Exercise Diary         Seated c/spine ROT 10x ea way  10x10'' ea way  Reviewed      Seated c/spine SB 10x ea way  10x10'' ea way  Manual by therapist today      VOR x1  3x30'' w/ pen tip  2x10 sitting and standing VOR x1 up/down and side/side                      Standing balance   2x10 up/down and side/side 2x10 up/down and side/side FT EO     Standing balance  FA EC 3x30'' max cues and CGA FT EC 3x30'' max cues and CGA                     Dynamic   Side stepping 4x10';   March walking 4x10' Side stepping 4x10', tandem walking 2x10', bwd walking 2x10'     Standing hip abd   2x10 ea leg             Biodex  Limits of stability 2 rounds;   Maze control 2 rounds; both static base Limits of stability 2 rounds;   Maze control 2 rounds; both static and level 12 base     Education   WJB: education on dizziness, HEP WJB Ther Act                                        Modalities

## 2021-04-22 ENCOUNTER — TELEPHONE (OUTPATIENT)
Dept: FAMILY MEDICINE CLINIC | Facility: CLINIC | Age: 86
End: 2021-04-22

## 2021-04-22 NOTE — PROGRESS NOTES
Assessment/Plan:    Move in with your daughter   For 2 months   Until all this is stable   Also get the echo out by her in [place]   Also get the BW done in 1 week  For K and BNP       1  Lower extremity edema  Comments:  due to suspected CHF from anemia   Orders:  -     furosemide (LASIX) 20 mg tablet; Take 1 tablet (20 mg total) by mouth daily  -     potassium chloride (K-DUR,KLOR-CON) 20 mEq tablet; Take 1 tablet (20 mEq total) by mouth daily  -     Echo complete with contrast if indicated; Future; Expected date: 04/21/2021  -     Basic metabolic panel; Future  -     NT-BNP PRO; Future    2  Nonrheumatic aortic valve stenosis  Comments:  mabye contributing to chf     3  Acute blood loss anemia  Comments:  increased 1 pt to 9's    4  Acute gastric ulcer due to Helicobacter pylori  Comments:  on meds   cont with GI          Subjective:      Patient ID: Delia Bella is a 82 y o  female  HPI  Here for worsening new onset le edema   Found out H pylori   seieng GI for bleed       The following portions of the patient's history were reviewed and updated as appropriate: allergies, current medications, past family history, past medical history, past social history, past surgical history and problem list     Review of Systems   Constitutional: Negative for fever and unexpected weight change  HENT: Negative for nosebleeds and trouble swallowing  Eyes: Negative for visual disturbance  Respiratory: Negative for chest tightness and shortness of breath  Cardiovascular: Negative for chest pain, palpitations and leg swelling  Gastrointestinal: Negative for abdominal pain, constipation, diarrhea and nausea  Endocrine: Negative for cold intolerance  Genitourinary: Negative for dysuria and urgency  Musculoskeletal: Positive for gait problem  Negative for joint swelling and myalgias  Skin: Negative for rash  Neurological: Positive for dizziness  Negative for tremors, seizures and syncope     Hematological: Does not bruise/bleed easily  Psychiatric/Behavioral: Positive for behavioral problems and decreased concentration  Negative for hallucinations and suicidal ideas           Objective:      /76 (BP Location: Left arm, Patient Position: Sitting, Cuff Size: Standard)   Pulse 82   Resp 16   Ht 5' 2" (1 575 m)   Wt 76 2 kg (168 lb)   SpO2 94%   BMI 30 73 kg/m²     Appointment on 04/19/2021   Component Date Value    WBC 04/19/2021 5 48     RBC 04/19/2021 3 37*    Hemoglobin 04/19/2021 9 6*    Hematocrit 04/19/2021 31 0*    MCV 04/19/2021 92     MCH 04/19/2021 28 5     MCHC 04/19/2021 31 0*    RDW 04/19/2021 14 1     MPV 04/19/2021 10 8     Platelets 17/05/9098 241     nRBC 04/19/2021 0     Neutrophils Relative 04/19/2021 57     Immat GRANS % 04/19/2021 0     Lymphocytes Relative 04/19/2021 30     Monocytes Relative 04/19/2021 10     Eosinophils Relative 04/19/2021 2     Basophils Relative 04/19/2021 1     Neutrophils Absolute 04/19/2021 3 15     Immature Grans Absolute 04/19/2021 0 01     Lymphocytes Absolute 04/19/2021 1 62     Monocytes Absolute 04/19/2021 0 55     Eosinophils Absolute 04/19/2021 0 11     Basophils Absolute 04/19/2021 0 04    Admission on 04/06/2021, Discharged on 04/09/2021   Component Date Value    WBC 04/06/2021 6 60     RBC 04/06/2021 2 72*    Hemoglobin 04/06/2021 8 0*    Hematocrit 04/06/2021 25 5*    MCV 04/06/2021 94     MCH 04/06/2021 29 4     MCHC 04/06/2021 31 4     RDW 04/06/2021 14 7     MPV 04/06/2021 10 4     Platelets 90/12/1874 213     Neutrophils Relative 04/06/2021 60     Immat GRANS % 04/06/2021 0     Lymphocytes Relative 04/06/2021 30     Monocytes Relative 04/06/2021 8     Eosinophils Relative 04/06/2021 2     Basophils Relative 04/06/2021 0     Neutrophils Absolute 04/06/2021 3 97     Immature Grans Absolute 04/06/2021 0 01     Lymphocytes Absolute 04/06/2021 1 95     Monocytes Absolute 04/06/2021 0 52     Eosinophils Absolute 04/06/2021 0 13     Basophils Absolute 04/06/2021 0 02     Sodium 04/06/2021 142     Potassium 04/06/2021 3 8     Chloride 04/06/2021 107     CO2 04/06/2021 25     ANION GAP 04/06/2021 10     BUN 04/06/2021 20     Creatinine 04/06/2021 1 23*    Glucose 04/06/2021 120     Calcium 04/06/2021 9 4     AST 04/06/2021 17     ALT 04/06/2021 15     Alkaline Phosphatase 04/06/2021 40 5     Total Protein 04/06/2021 6 4     Albumin 04/06/2021 4 0     Total Bilirubin 04/06/2021 0 36     eGFR 04/06/2021 41     Magnesium 04/06/2021 2 0     TSH 3RD GENERATON 04/06/2021 2 372     ABO Grouping 04/06/2021 B     Rh Factor 04/06/2021 Positive     Antibody Screen 04/06/2021 Negative     Specimen Expiration Date 04/06/2021 [id number]     Troponin I 04/06/2021 <0 03     ABO Grouping 04/06/2021 B     Rh Factor 04/06/2021 Positive     Ventricular Rate 04/06/2021 92     Atrial Rate 04/06/2021 90     CT Interval 04/06/2021 149     QRSD Interval 04/06/2021 85     QT Interval 04/06/2021 359     QTC Interval 04/06/2021 445     P Axis 04/06/2021 73     QRS Axis 04/06/2021 -19     T Wave Axis 04/06/2021 62     Hemoglobin 04/06/2021 7 2*    Hematocrit 04/06/2021 23 0*    Hemoglobin 04/06/2021 7 3*    Hematocrit 04/06/2021 23 3*    Protime 04/06/2021 10 9     INR 04/06/2021 0 96     Iron Saturation 04/06/2021 17     TIBC 04/06/2021 335     Iron 04/06/2021 56     Ferritin 04/06/2021 15     Unit Product Code 04/07/2021 [id number]     Unit Number 04/07/2021 [id number]     Unit ABO 04/07/2021 O     Unit RH 04/07/2021 POS     Crossmatch 04/07/2021 Compatible     Unit Dispense Status 04/07/2021 Presumed Trans     WBC 04/07/2021 5 24     RBC 04/07/2021 2 82*    Hemoglobin 04/07/2021 8 4*    Hematocrit 04/07/2021 26 7*    MCV 04/07/2021 95     MCH 04/07/2021 29 8     MCHC 04/07/2021 31 5     RDW 04/07/2021 14 7     MPV 04/07/2021 11 0     Platelets 70/97/4163 191     Neutrophils Relative 04/07/2021 52     Immat GRANS % 04/07/2021 0     Lymphocytes Relative 04/07/2021 36     Monocytes Relative 04/07/2021 9     Eosinophils Relative 04/07/2021 3     Basophils Relative 04/07/2021 0     Neutrophils Absolute 04/07/2021 2 71     Immature Grans Absolute 04/07/2021 0 01     Lymphocytes Absolute 04/07/2021 1 90     Monocytes Absolute 04/07/2021 0 47     Eosinophils Absolute 04/07/2021 0 13     Basophils Absolute 04/07/2021 0 02     Sodium 04/07/2021 145     Potassium 04/07/2021 3 6     Chloride 04/07/2021 109*    CO2 04/07/2021 29     ANION GAP 04/07/2021 7     BUN 04/07/2021 17     Creatinine 04/07/2021 1 22*    Glucose 04/07/2021 88     Calcium 04/07/2021 8 7     eGFR 04/07/2021 41     Magnesium 04/07/2021 2 0     Phosphorus 04/07/2021 3 7     Hemoglobin 04/07/2021 8 6*    Hematocrit 04/07/2021 27 1*    Case Report 04/07/2021                      Value:Surgical Pathology Report                         Case: [id number]                                   Authorizing Provider:  Clayton Jaime MD      Collected:           04/07/2021 1444              Ordering Location:     [facility] Received:            04/07/2021 1502                                     3rd  Floor Med Surg Unit                                                     Pathologist:           Moy Salas MD                                                                  Specimen:    Stomach, bx antrum r/o h pylori                                                            Final Diagnosis 04/07/2021                      Value: This result contains rich text formatting which cannot be displayed here   Additional Information 04/07/2021                      Value: This result contains rich text formatting which cannot be displayed here  Jefe Escalante Gross Description 04/07/2021                      Value: This result contains rich text formatting which cannot be displayed here      Hemoglobin 04/07/2021 8 1*    Hematocrit 04/07/2021 25 6*    Sodium 04/08/2021 143     Potassium 04/08/2021 3 9     Chloride 04/08/2021 109*    CO2 04/08/2021 26     ANION GAP 04/08/2021 8     BUN 04/08/2021 14     Creatinine 04/08/2021 1 05     Glucose 04/08/2021 85     Calcium 04/08/2021 8 7     eGFR 04/08/2021 49     WBC 04/08/2021 5 77     RBC 04/08/2021 2 90*    Hemoglobin 04/08/2021 8 6*    Hematocrit 04/08/2021 27 2*    MCV 04/08/2021 94     MCH 04/08/2021 29 7     MCHC 04/08/2021 31 6     RDW 04/08/2021 14 8     MPV 04/08/2021 10 5     Platelets 12/30/6798 188     Neutrophils Relative 04/08/2021 68     Immat GRANS % 04/08/2021 0     Lymphocytes Relative 04/08/2021 22     Monocytes Relative 04/08/2021 7     Eosinophils Relative 04/08/2021 3     Basophils Relative 04/08/2021 0     Neutrophils Absolute 04/08/2021 3 90     Immature Grans Absolute 04/08/2021 0 00     Lymphocytes Absolute 04/08/2021 1 26     Monocytes Absolute 04/08/2021 0 42     Eosinophils Absolute 04/08/2021 0 17     Basophils Absolute 04/08/2021 0 02     WBC 04/09/2021 5 69     RBC 04/09/2021 2 88*    Hemoglobin 04/09/2021 8 6*    Hematocrit 04/09/2021 27 0*    MCV 04/09/2021 94     MCH 04/09/2021 29 9     MCHC 04/09/2021 31 9     RDW 04/09/2021 14 5     MPV 04/09/2021 11 2     Platelets 31/81/1591 181     Neutrophils Relative 04/09/2021 63     Immat GRANS % 04/09/2021 0     Lymphocytes Relative 04/09/2021 25     Monocytes Relative 04/09/2021 9     Eosinophils Relative 04/09/2021 3     Basophils Relative 04/09/2021 0     Neutrophils Absolute 04/09/2021 3 60     Immature Grans Absolute 04/09/2021 0 00     Lymphocytes Absolute 04/09/2021 1 42     Monocytes Absolute 04/09/2021 0 51     Eosinophils Absolute 04/09/2021 0 15     Basophils Absolute 04/09/2021 0 01     Case Report 04/09/2021                      Value:Surgical Pathology Report                         Case: [id number] Authorizing Provider:  Ethan Hickey MD           Collected:           04/09/2021 1241              [facility] Received:            04/09/2021 1321                                     3rd  Floor Med Surg Unit                                                     Pathologist:           Zuleyka Noyola MD                                                                 Specimen:    Esophagus, bx lower esophagus for erythema                                                 Final Diagnosis 04/09/2021                      Value: This result contains rich text formatting which cannot be displayed here   Additional Information 04/09/2021                      Value: This result contains rich text formatting which cannot be displayed here  Renee Gross Description 04/09/2021                      Value: This result contains rich text formatting which cannot be displayed here  Physical Exam  Vitals signs and nursing note reviewed  Constitutional:       Appearance: She is well-developed  Comments: Unsteady    HENT:      Head: Normocephalic and atraumatic  Neck:      Musculoskeletal: Normal range of motion and neck supple  Cardiovascular:      Rate and Rhythm: Normal rate and regular rhythm  Heart sounds: Normal heart sounds  No murmur  Pulmonary:      Effort: Pulmonary effort is normal       Breath sounds: Normal breath sounds  No wheezing or rales  Abdominal:      General: Bowel sounds are normal  There is no distension  Palpations: Abdomen is soft  Tenderness: There is no abdominal tenderness  Musculoskeletal: Normal range of motion  General: No tenderness  Lymphadenopathy:      Cervical: No cervical adenopathy  Skin:     General: Skin is warm and dry  Capillary Refill: Capillary refill takes less than 2 seconds  Findings: No rash  Neurological:      Mental Status: She is alert  Mental status is at baseline  Cranial Nerves:  No cranial nerve deficit  Sensory: No sensory deficit  Motor: No abnormal muscle tone  Psychiatric:         Behavior: Behavior normal          Thought Content:  Thought content normal          Judgment: Judgment normal              Lucretia Garrison MD  [place] [id number]

## 2021-04-22 NOTE — TELEPHONE ENCOUNTER
Patient's daughter call requesting a call back from Dr Theirry Olivera  She stated that patient could was unclear as to what transpired at [address] [facility] appointment  Can you please call daughter back when you have a chance

## 2021-04-23 ENCOUNTER — OFFICE VISIT (OUTPATIENT)
Dept: PHYSICAL THERAPY | Facility: CLINIC | Age: 86
End: 2021-04-23
Payer: COMMERCIAL

## 2021-04-23 DIAGNOSIS — R42 DIZZINESS AND GIDDINESS: Primary | ICD-10-CM

## 2021-04-23 PROCEDURE — 97110 THERAPEUTIC EXERCISES: CPT | Performed by: PHYSICAL THERAPIST

## 2021-04-23 NOTE — PROGRESS NOTES
Discharge Note     Today's date: 2021  Patient name: Tori Romero  : 1935  MRN: [id number]  Referring provider: Madeline Soto MD  Dx:   Encounter Diagnosis     ICD-10-CM    1  Dizziness and giddiness  R42                   Subjective: Pt reports feeling more dizzy than usual, she states she feels like medication is causing her problems, pt states she is going to be moving in with her daughter as she lives alone right now  Pt is very surprised that she owes $30 per session and states that she was not aware of that  Objective: See treatment diary below      Assessment: Discussion and education was given today regarding plan going forward  Due to pt's major concerns of finances with co-pay as well as pt's little benefit from PT at this time, will hold and DC pt from therapy at this time  Pt does take several medications which may be playing a role with her dizziness; as well as likely psychological component with anxiety; and may benefit from an adjustment with medications and discussion with her PCP at this time  Pt will also be leaving the area and will be moving in with her daughter therefore will not be able to make it to therapy sessions anymore  Pt agrees with the DC plan  She states she will continue to perform exercises on HEP in which she verbalizes and demonstrates moderate understanding  Would see pt again at a later time if needed  DC to HEP        Plan: DC to HEP     Diagnosis: dizziness   Precautions: HTN, previous cardiac hx, fall risk    Primary goals: improve balance decrease dizziness   Asterisks next to exercises = provided for patient HEP   Manual Therapy     Neck PROM  WJB: seated WJB: seated and MFR                                     Exercise Diary         Seated c/spine ROT 10x ea way  10x10'' ea way  Reviewed  Reviewed exercises for DC HEP    Seated c/spine SB 10x ea way  10x10'' ea way  Manual by therapist today      VOR x1  3x30'' w/ pen tip  2x10 sitting and standing VOR x1 up/down and side/side                      Standing balance   2x10 up/down and side/side 2x10 up/down and side/side FT EO     Standing balance  FA EC 3x30'' max cues and CGA FT EC 3x30'' max cues and CGA                     Dynamic   Side stepping 4x10';   March walking 4x10' Side stepping 4x10', tandem walking 2x10', bwd walking 2x10'     Standing hip abd   2x10 ea leg             Biodex  Limits of stability 2 rounds;   Maze control 2 rounds; both static base Limits of stability 2 rounds;   Maze control 2 rounds; both static and level 12 base     Education   WJB: education on dizziness, HEP WJB WJB: education regarding POC going forward and likely causes of dizziness, DC HEP            Ther Act                                        Modalities

## 2021-04-26 ENCOUNTER — APPOINTMENT (OUTPATIENT)
Dept: PHYSICAL THERAPY | Facility: CLINIC | Age: 86
End: 2021-04-26
Payer: COMMERCIAL

## 2021-04-28 ENCOUNTER — APPOINTMENT (OUTPATIENT)
Dept: LAB | Facility: CLINIC | Age: 86
End: 2021-04-28
Payer: COMMERCIAL

## 2021-04-28 ENCOUNTER — OFFICE VISIT (OUTPATIENT)
Dept: GASTROENTEROLOGY | Facility: CLINIC | Age: 86
End: 2021-04-28
Payer: COMMERCIAL

## 2021-04-28 VITALS
DIASTOLIC BLOOD PRESSURE: 63 MMHG | BODY MASS INDEX: 30.91 KG/M2 | HEART RATE: 83 BPM | WEIGHT: 168 LBS | HEIGHT: 62 IN | SYSTOLIC BLOOD PRESSURE: 138 MMHG

## 2021-04-28 DIAGNOSIS — K27.9 PUD (PEPTIC ULCER DISEASE): ICD-10-CM

## 2021-04-28 DIAGNOSIS — R60.0 LOWER EXTREMITY EDEMA: ICD-10-CM

## 2021-04-28 DIAGNOSIS — K92.2 UPPER GI BLEED: ICD-10-CM

## 2021-04-28 DIAGNOSIS — D62 ACUTE BLOOD LOSS ANEMIA: ICD-10-CM

## 2021-04-28 LAB
ANION GAP SERPL CALCULATED.3IONS-SCNC: 3 MMOL/L (ref 4–13)
BUN SERPL-MCNC: 16 MG/DL (ref 5–25)
CALCIUM SERPL-MCNC: 9.5 MG/DL (ref 8.3–10.1)
CHLORIDE SERPL-SCNC: 109 MMOL/L (ref 100–108)
CO2 SERPL-SCNC: 31 MMOL/L (ref 21–32)
CREAT SERPL-MCNC: 1.08 MG/DL (ref 0.6–1.3)
GFR SERPL CREATININE-BSD FRML MDRD: 48 ML/MIN/1.73SQ M
GLUCOSE P FAST SERPL-MCNC: 96 MG/DL (ref 65–99)
NT-PROBNP SERPL-MCNC: 122 PG/ML
POTASSIUM SERPL-SCNC: 4.2 MMOL/L (ref 3.5–5.3)
SODIUM SERPL-SCNC: 143 MMOL/L (ref 136–145)

## 2021-04-28 PROCEDURE — 1160F RVW MEDS BY RX/DR IN RCRD: CPT | Performed by: INTERNAL MEDICINE

## 2021-04-28 PROCEDURE — 80048 BASIC METABOLIC PNL TOTAL CA: CPT

## 2021-04-28 PROCEDURE — 36415 COLL VENOUS BLD VENIPUNCTURE: CPT

## 2021-04-28 PROCEDURE — 1036F TOBACCO NON-USER: CPT | Performed by: INTERNAL MEDICINE

## 2021-04-28 PROCEDURE — 83880 ASSAY OF NATRIURETIC PEPTIDE: CPT

## 2021-04-28 PROCEDURE — 99213 OFFICE O/P EST LOW 20 MIN: CPT | Performed by: INTERNAL MEDICINE

## 2021-04-28 NOTE — PROGRESS NOTES
[facility] - Outpatient Follow-up Note  Ronaldo Mota 82 y o  female MRN: [id number]  Encounter: [id number]          ASSESSMENT AND PLAN:      1  Acute blood loss anemia  2  PUD (peptic ulcer disease)  3  Upper GI bleed    Will finish treatment  For H pylori and of undergo follow-up fecal antigen testing in a few months  Will follow-up here for re-evaluation around that time loss has recurrent symptoms  We discussed the possibility of repeat EGD to confirm healing, though this does not seem necessary at this time  ______________________________________________________________________    SUBJECTIVE:   Patient recently hospitalized with acute upper GI bleed  Noted to have oozing from several sites in the stomach as well as in the small bowel near the ampulla of Vater  Treated with argon plasma coagulation with resolution of bleeding on follow-up EGD several days later  Biopsies confirmed diagnosis of H pylori and patient is nearing the end of treatment with bismuth, metronidazole, tetracycline and pantoprazole quadruple therapy  She  Has had black stool since starting iron supplementation  Her hemoglobin has risen from a 8 4-9 4  REVIEW OF SYSTEMS:    Review of Systems   Cardiovascular: Positive for leg swelling  All other systems reviewed and are negative           Historical Information   Past Medical History:   Diagnosis Date    Aortic valve stenosis     Depression     H/O echocardiogram 08/2019    High cholesterol     Hyperlipidemia     Hypertension     Hypothyroidism     Insomnia     Mitral valve regurgitation      Past Surgical History:   Procedure Laterality Date    CARDIAC ELECTROPHYSIOLOGY PROCEDURE  09/2019    CATARACT EXTRACTION, BILATERAL  2016    COLONOSCOPY  2019    fecal implant    EYE SURGERY      KNEE CARTILAGE SURGERY      KNEE SURGERY Left     menuscus torn/repaired    MAMMO (HISTORICAL)  2017    [facility]     Social History   Social History     Substance and Sexual Activity   Alcohol Use Yes    Frequency: 4 or more times a week    Drinks per session: 1 or 2     Social History     Substance and Sexual Activity   Drug Use Never     Social History     Tobacco Use   Smoking Status Former Smoker    Packs/day: 1 00    Years: 50 00    Pack years: 50 00    Quit date: 0    Years since quittin 3   Smokeless Tobacco Never Used     Family History   Problem Relation Age of Onset   Wash Caller Sudden death Mother         cardiac    Hypertension Mother     Stroke Father     Hypertension Sister     Breast cancer Sister        Meds/Allergies       Current Outpatient Medications:     amLODIPine (NORVASC) 2 5 mg tablet    atorvastatin (LIPITOR) 20 mg tablet    b complex-vitamin C-folic acid (NEPHROCAPS) 1 mg capsule    bismuth subsalicylate (PEPTO BISMOL) 262 MG chewable tablet    Calcium 600-200 MG-UNIT per tablet    ferrous sulfate 324 (65 Fe) mg    furosemide (LASIX) 20 mg tablet    lisinopril (ZESTRIL) 20 mg tablet    metroNIDAZOLE (FLAGYL) 250 mg tablet    Omega-3 Fatty Acids (FISH OIL) 1200 MG CAPS    pantoprazole (PROTONIX) 40 mg tablet    potassium chloride (K-DUR,KLOR-CON) 20 mEq tablet    sertraline (ZOLOFT) 25 mg tablet    sucralfate (CARAFATE) 1 g tablet    tetracycline (ACHROMYCIN,SUMYCIN) 500 MG capsule    Allergies   Allergen Reactions    Amoxicillin Diarrhea           Objective     Blood pressure 138/63, pulse 83, height 5' 2" (1 575 m), weight 76 2 kg (168 lb)  Body mass index is 30 73 kg/m²  PHYSICAL EXAM:      Physical Exam  Vitals signs and nursing note reviewed  Constitutional:       General: She is not in acute distress  Appearance: She is not ill-appearing  HENT:      Head: Normocephalic and atraumatic  Eyes:      General: No scleral icterus  Extraocular Movements: Extraocular movements intact  Cardiovascular:      Rate and Rhythm: Normal rate and regular rhythm     Pulmonary:      Effort: Pulmonary effort is normal  No respiratory distress  Abdominal:      General: There is no distension  Palpations: Abdomen is soft  Skin:     General: Skin is warm and dry  Coloration: Skin is not cyanotic  Findings: No erythema  Neurological:      General: No focal deficit present  Mental Status: She is alert and oriented to person, place, and time  Psychiatric:         Mood and Affect: Mood normal          Behavior: Behavior normal           Lab Results:   No visits with results within 1 Day(s) from this visit  Latest known visit with results is:   Appointment on 04/19/2021   Component Date Value    WBC 04/19/2021 5 48     RBC 04/19/2021 3 37*    Hemoglobin 04/19/2021 9 6*    Hematocrit 04/19/2021 31 0*    MCV 04/19/2021 92     MCH 04/19/2021 28 5     MCHC 04/19/2021 31 0*    RDW 04/19/2021 14 1     MPV 04/19/2021 10 8     Platelets 37/26/2035 241     nRBC 04/19/2021 0     Neutrophils Relative 04/19/2021 57     Immat GRANS % 04/19/2021 0     Lymphocytes Relative 04/19/2021 30     Monocytes Relative 04/19/2021 10     Eosinophils Relative 04/19/2021 2     Basophils Relative 04/19/2021 1     Neutrophils Absolute 04/19/2021 3 15     Immature Grans Absolute 04/19/2021 0 01     Lymphocytes Absolute 04/19/2021 1 62     Monocytes Absolute 04/19/2021 0 55     Eosinophils Absolute 04/19/2021 0 11     Basophils Absolute 04/19/2021 0 04          Radiology Results:   No results found

## 2021-04-29 ENCOUNTER — APPOINTMENT (OUTPATIENT)
Dept: PHYSICAL THERAPY | Facility: CLINIC | Age: 86
End: 2021-04-29
Payer: COMMERCIAL

## 2021-04-29 DIAGNOSIS — D62 ACUTE BLOOD LOSS ANEMIA: ICD-10-CM

## 2021-04-30 RX ORDER — SUCRALFATE 1 G/1
1 TABLET ORAL
Qty: 112 TABLET | Refills: 0 | OUTPATIENT
Start: 2021-04-30 | End: 2021-05-28

## 2021-04-30 RX ORDER — PANTOPRAZOLE SODIUM 40 MG/1
40 TABLET, DELAYED RELEASE ORAL
Qty: 56 TABLET | Refills: 0 | OUTPATIENT
Start: 2021-04-30 | End: 2021-05-28

## 2021-04-30 RX ORDER — FERROUS SULFATE TAB EC 324 MG (65 MG FE EQUIVALENT) 324 (65 FE) MG
324 TABLET DELAYED RESPONSE ORAL
Qty: 56 TABLET | Refills: 0 | OUTPATIENT
Start: 2021-04-30 | End: 2021-05-28

## 2021-05-04 NOTE — TELEPHONE ENCOUNTER
Pantoprazole was part of her treatment regimen for H pylori and should be completed discontinued  Sucralfate is no longer necessary  Iron supplementation can be continued if she is tolerating this until her hemoglobin has normalized    Thank you

## 2021-05-05 DIAGNOSIS — D62 ACUTE BLOOD LOSS ANEMIA: ICD-10-CM

## 2021-05-05 RX ORDER — FERROUS SULFATE TAB EC 324 MG (65 MG FE EQUIVALENT) 324 (65 FE) MG
324 TABLET DELAYED RESPONSE ORAL
Qty: 56 TABLET | Refills: 2 | Status: SHIPPED | OUTPATIENT
Start: 2021-05-05 | End: 2021-07-01

## 2021-05-11 DIAGNOSIS — R60.0 LOWER EXTREMITY EDEMA: ICD-10-CM

## 2021-05-12 RX ORDER — POTASSIUM CHLORIDE 20 MEQ/1
20 TABLET, EXTENDED RELEASE ORAL DAILY
Qty: 30 TABLET | Refills: 0 | Status: SHIPPED | OUTPATIENT
Start: 2021-05-12 | End: 2021-05-13 | Stop reason: SDUPTHER

## 2021-05-12 RX ORDER — FUROSEMIDE 20 MG/1
20 TABLET ORAL DAILY
Qty: 30 TABLET | Refills: 0 | Status: SHIPPED | OUTPATIENT
Start: 2021-05-12 | End: 2021-06-08

## 2021-05-12 NOTE — TELEPHONE ENCOUNTER
Daughter called in requesting these be sent to the pharmacy closer to her- [facility]  [address]

## 2021-05-13 DIAGNOSIS — R60.0 LOWER EXTREMITY EDEMA: ICD-10-CM

## 2021-05-13 RX ORDER — FUROSEMIDE 20 MG/1
TABLET ORAL
Qty: 30 TABLET | Refills: 0 | OUTPATIENT
Start: 2021-05-13

## 2021-05-13 RX ORDER — POTASSIUM CHLORIDE 1500 MG/1
TABLET, EXTENDED RELEASE ORAL
Qty: 30 TABLET | Refills: 0 | OUTPATIENT
Start: 2021-05-13

## 2021-05-13 RX ORDER — POTASSIUM CHLORIDE 20 MEQ/1
20 TABLET, EXTENDED RELEASE ORAL DAILY
Qty: 30 TABLET | Refills: 0 | Status: SHIPPED | OUTPATIENT
Start: 2021-05-13 | End: 2021-07-05

## 2021-05-19 ENCOUNTER — TELEPHONE (OUTPATIENT)
Dept: GASTROENTEROLOGY | Facility: CLINIC | Age: 86
End: 2021-05-19

## 2021-05-19 NOTE — TELEPHONE ENCOUNTER
I left message for patient that I cancelled her July 21st appointment with Dr Raimla Dc due to a change in his schedule  I asked her to call back and reschedule  Will call again in 1 week if she doesn't return call

## 2021-05-20 ENCOUNTER — TELEPHONE (OUTPATIENT)
Dept: FAMILY MEDICINE CLINIC | Facility: CLINIC | Age: 86
End: 2021-05-20

## 2021-05-20 NOTE — TELEPHONE ENCOUNTER
Patients daughter called and advised that her Mom has been sleeping all the time and her blood pressure is running low at 99/54  This behavior is not normal for this patient  Daughter advised ER to be evaluated

## 2021-05-24 NOTE — TELEPHONE ENCOUNTER
Patient had left message asking for a call to reschedule an appointment with Dr Malu Ornelas  I returned her call and had to leave message for her to call back

## 2021-05-27 DIAGNOSIS — F32.A DEPRESSION, UNSPECIFIED DEPRESSION TYPE: ICD-10-CM

## 2021-05-27 RX ORDER — SERTRALINE HYDROCHLORIDE 25 MG/1
TABLET, FILM COATED ORAL
Qty: 90 TABLET | Refills: 1 | Status: SHIPPED | OUTPATIENT
Start: 2021-05-27

## 2021-06-07 ENCOUNTER — HOSPITAL ENCOUNTER (OUTPATIENT)
Dept: NON INVASIVE DIAGNOSTICS | Facility: HOSPITAL | Age: 86
Discharge: HOME | End: 2021-06-07
Payer: COMMERCIAL

## 2021-06-07 DIAGNOSIS — R60.0 LOWER EXTREMITY EDEMA: ICD-10-CM

## 2021-06-07 PROCEDURE — C8929 TTE W OR WO FOL WCON,DOPPLER: HCPCS

## 2021-06-07 RX ADMIN — PERFLUTREN 0.4 ML/MIN: 6.52 INJECTION, SUSPENSION INTRAVENOUS at 16:29

## 2021-06-08 DIAGNOSIS — R60.0 LOWER EXTREMITY EDEMA: ICD-10-CM

## 2021-06-08 PROCEDURE — 93306 TTE W/DOPPLER COMPLETE: CPT | Performed by: INTERNAL MEDICINE

## 2021-06-08 RX ORDER — FUROSEMIDE 20 MG/1
TABLET ORAL
Qty: 90 TABLET | Refills: 1 | Status: SHIPPED | OUTPATIENT
Start: 2021-06-08

## 2021-06-09 RX ORDER — FLUTICASONE PROPIONATE 50 MCG
SPRAY, SUSPENSION (ML) NASAL
COMMUNITY
Start: 2021-05-19

## 2021-06-29 DIAGNOSIS — E78.5 HYPERLIPIDEMIA, UNSPECIFIED HYPERLIPIDEMIA TYPE: ICD-10-CM

## 2021-06-29 RX ORDER — ATORVASTATIN CALCIUM 20 MG/1
TABLET, FILM COATED ORAL
Qty: 90 TABLET | Refills: 1 | Status: SHIPPED | OUTPATIENT
Start: 2021-06-29

## 2021-07-01 DIAGNOSIS — D62 ACUTE BLOOD LOSS ANEMIA: ICD-10-CM

## 2021-07-01 RX ORDER — FERROUS SULFATE TAB EC 324 MG (65 MG FE EQUIVALENT) 324 (65 FE) MG
324 TABLET DELAYED RESPONSE ORAL
Qty: 56 TABLET | Refills: 1 | Status: SHIPPED | OUTPATIENT
Start: 2021-07-01 | End: 2021-07-21

## 2021-07-20 DIAGNOSIS — D62 ACUTE BLOOD LOSS ANEMIA: ICD-10-CM

## 2021-07-21 RX ORDER — FERROUS SULFATE TAB EC 324 MG (65 MG FE EQUIVALENT) 324 (65 FE) MG
324 TABLET DELAYED RESPONSE ORAL
Qty: 168 TABLET | Refills: 1 | Status: SHIPPED | OUTPATIENT
Start: 2021-07-21 | End: 2021-08-18

## 2021-07-28 ENCOUNTER — OFFICE VISIT (OUTPATIENT)
Dept: GASTROENTEROLOGY | Facility: CLINIC | Age: 86
End: 2021-07-28
Payer: COMMERCIAL

## 2021-07-28 VITALS
HEART RATE: 59 BPM | HEIGHT: 62 IN | WEIGHT: 158 LBS | BODY MASS INDEX: 29.08 KG/M2 | SYSTOLIC BLOOD PRESSURE: 117 MMHG | DIASTOLIC BLOOD PRESSURE: 55 MMHG

## 2021-07-28 DIAGNOSIS — B96.81 HELICOBACTER PYLORI GASTRITIS: ICD-10-CM

## 2021-07-28 DIAGNOSIS — K92.2 ACUTE UPPER GI BLEED: Primary | ICD-10-CM

## 2021-07-28 DIAGNOSIS — K29.70 HELICOBACTER PYLORI GASTRITIS: ICD-10-CM

## 2021-07-28 PROCEDURE — 1160F RVW MEDS BY RX/DR IN RCRD: CPT | Performed by: INTERNAL MEDICINE

## 2021-07-28 PROCEDURE — 1036F TOBACCO NON-USER: CPT | Performed by: INTERNAL MEDICINE

## 2021-07-28 PROCEDURE — 99213 OFFICE O/P EST LOW 20 MIN: CPT | Performed by: INTERNAL MEDICINE

## 2021-07-28 NOTE — PROGRESS NOTES
[facility] - Outpatient Follow-up Note  Viktoria Gregory 82 y o  female MRN: [id number]  Encounter: [id number]          ASSESSMENT AND PLAN:      1  Acute upper GI bleed  2  Helicobacter pylori gastritis    H pylori has been eradicated with quadruple therapy and hemoglobin and iron studies normalized  Will  Follow-up with her primary care provider and Gastroenterology as needed  Will be moving out of the area  No need for continued screening colonoscopy    ______________________________________________________________________    SUBJECTIVE:    Patient returns in follow-up of upper GI bleeding and H pylori  Has completed a course of quadruple therapy several months ago and subsequent fecal antigen was negative  She is feeling very well with no further evidence of bleeding, no abdominal pain, nausea vomiting, fever chills, jaundice or rashes  Her most recent labs earlier this month revealed normalization of her hemoglobin and iron studies  Reports colonoscopy within the past 5 or 6 years with no evidence of polyps and no prior history of polyps  REVIEW OF SYSTEMS:    Review of Systems   Genitourinary:        Elevated creatinine  Scheduled to see Nephrology   All other systems reviewed and are negative           Historical Information   Past Medical History:   Diagnosis Date    Aortic valve stenosis     Depression     H/O echocardiogram 08/2019    High cholesterol     Hyperlipidemia     Hypertension     Hypothyroidism     Insomnia     Mitral valve regurgitation      Past Surgical History:   Procedure Laterality Date    CARDIAC ELECTROPHYSIOLOGY PROCEDURE  09/2019    CATARACT EXTRACTION, BILATERAL  2016    COLONOSCOPY  2019    fecal implant    EYE SURGERY      KNEE CARTILAGE SURGERY      KNEE SURGERY Left     menuscus torn/repaired    MAMMO (HISTORICAL)  2017    [address] [facility]     Social History   Social History     Substance and Sexual Activity   Alcohol Use Yes     Social History     Substance and Sexual Activity   Drug Use Never     Social History     Tobacco Use   Smoking Status Former Smoker    Packs/day: 1 00    Years: 50 00    Pack years: 50 00    Quit date: 0    Years since quittin 5   Smokeless Tobacco Never Used     Family History   Problem Relation Age of Onset   Shelley Mckeon Sudden death Mother         cardiac    Hypertension Mother     Stroke Father     Hypertension Sister     Breast cancer Sister        Meds/Allergies       Current Outpatient Medications:     atorvastatin (LIPITOR) 20 mg tablet    b complex-vitamin C-folic acid (NEPHROCAPS) 1 mg capsule    Calcium 600-200 MG-UNIT per tablet    ferrous sulfate 324 (65 Fe) mg    furosemide (LASIX) 20 mg tablet    lisinopril (ZESTRIL) 20 mg tablet    Omega-3 Fatty Acids (FISH OIL) 1200 MG CAPS    sertraline (ZOLOFT) 25 mg tablet    amLODIPine (NORVASC) 2 5 mg tablet    fluticasone (FLONASE) 50 mcg/act nasal spray    Klor-Con M20 20 MEQ tablet    Allergies   Allergen Reactions    Amoxicillin Diarrhea           Objective     Blood pressure 117/55, pulse 59, height 5' 2" (1 575 m), weight 71 7 kg (158 lb)  Body mass index is 28 9 kg/m²  PHYSICAL EXAM:      Physical Exam  Vitals and nursing note reviewed  Constitutional:       General: She is not in acute distress  Appearance: She is not ill-appearing  HENT:      Head: Normocephalic and atraumatic  Eyes:      General: No scleral icterus  Extraocular Movements: Extraocular movements intact  Cardiovascular:      Rate and Rhythm: Normal rate and regular rhythm  Pulmonary:      Effort: Pulmonary effort is normal  No respiratory distress  Abdominal:      General: There is no distension  Skin:     General: Skin is warm and dry  Coloration: Skin is not cyanotic  Findings: No erythema  Neurological:      General: No focal deficit present  Mental Status: She is alert and oriented to person, place, and time     Psychiatric: Mood and Affect: Mood normal          Behavior: Behavior normal           Lab Results:   No visits with results within 1 Day(s) from this visit  Latest known visit with results is:   Appointment on 04/28/2021   Component Date Value    Sodium 04/28/2021 143     Potassium 04/28/2021 4 2     Chloride 04/28/2021 109*    CO2 04/28/2021 31     ANION GAP 04/28/2021 3*    BUN 04/28/2021 16     Creatinine 04/28/2021 1 08     Glucose, Fasting 04/28/2021 96     Calcium 04/28/2021 9 5     eGFR 04/28/2021 48     NT-proBNP 04/28/2021 122          Radiology Results:   No results found

## 2022-11-21 ENCOUNTER — TELEPHONE (OUTPATIENT)
Dept: GASTROENTEROLOGY | Facility: CLINIC | Age: 87
End: 2022-11-21

## 2022-11-22 NOTE — TELEPHONE ENCOUNTER
Pt would need an appt due to age  I lmom for pt to please call back to schedule an appt  Will call pt again in one week if do not hear back from her